# Patient Record
Sex: MALE | Race: WHITE | NOT HISPANIC OR LATINO | Employment: FULL TIME | URBAN - METROPOLITAN AREA
[De-identification: names, ages, dates, MRNs, and addresses within clinical notes are randomized per-mention and may not be internally consistent; named-entity substitution may affect disease eponyms.]

---

## 2018-02-20 ENCOUNTER — OFFICE VISIT (OUTPATIENT)
Dept: FAMILY MEDICINE | Facility: CLINIC | Age: 33
End: 2018-02-20
Payer: COMMERCIAL

## 2018-02-20 VITALS
HEIGHT: 74 IN | HEART RATE: 70 BPM | OXYGEN SATURATION: 99 % | DIASTOLIC BLOOD PRESSURE: 84 MMHG | WEIGHT: 184 LBS | BODY MASS INDEX: 23.61 KG/M2 | TEMPERATURE: 97.8 F | RESPIRATION RATE: 24 BRPM | SYSTOLIC BLOOD PRESSURE: 131 MMHG

## 2018-02-20 DIAGNOSIS — R05.9 COUGH: Primary | ICD-10-CM

## 2018-02-20 PROBLEM — Z13.6 CARDIOVASCULAR SCREENING; LDL GOAL LESS THAN 160: Status: ACTIVE | Noted: 2018-02-20

## 2018-02-20 PROCEDURE — 99213 OFFICE O/P EST LOW 20 MIN: CPT | Performed by: NURSE PRACTITIONER

## 2018-02-20 RX ORDER — DOXYCYCLINE 100 MG/1
100 CAPSULE ORAL 2 TIMES DAILY
Qty: 20 CAPSULE | Refills: 0 | Status: SHIPPED | OUTPATIENT
Start: 2018-02-20 | End: 2021-10-20

## 2018-02-20 RX ORDER — PREDNISONE 20 MG/1
20 TABLET ORAL DAILY
Qty: 5 TABLET | Refills: 0 | Status: SHIPPED | OUTPATIENT
Start: 2018-02-20 | End: 2021-10-20

## 2018-02-20 NOTE — PROGRESS NOTES
"  SUBJECTIVE:   Morro Coronel is a 32 year old male who presents to clinic today for the following health issues:      RESPIRATORY SYMPTOMS      Duration: 9 days     Description  nasal congestion, cough, wheezing, headache and chest tightness     Severity: severe    Accompanying signs and symptoms:     History (predisposing factors):  none    Precipitating or alleviating factors: None    Therapies tried and outcome:  Cough suppressants, ibuprofen, sudafed     No fevers  No body aches.    Denies sore throat or congestion.    No known exposures.      Reviewed and updated as needed this visit by clinical staff  Tobacco  Allergies  Meds  Problems  Med Hx  Surg Hx  Fam Hx  Soc Hx        Reviewed and updated as needed this visit by Provider  Tobacco  Allergies  Meds  Problems  Med Hx  Surg Hx  Fam Hx  Soc Hx          ROS:  Constitutional, HEENT, cardiovascular, pulmonary, GI, , musculoskeletal, neuro, skin, endocrine and psych systems are negative, except as otherwise noted.    OBJECTIVE:     /84  Pulse 70  Temp 97.8  F (36.6  C) (Oral)  Resp 24  Ht 6' 1.5\" (1.867 m)  Wt 184 lb (83.5 kg)  SpO2 99%  BMI 23.95 kg/m2  Body mass index is 23.95 kg/(m^2).  GENERAL: healthy, alert and no distress  EYES: Eyes grossly normal to inspection, PERRL and conjunctivae and sclerae normal  HENT: ear canals and TM's normal, nose and mouth without ulcers or lesions  NECK: no adenopathy, no asymmetry, masses, or scars and thyroid normal to palpation  RESP: lungs clear to auscultation - no rales, rhonchi or wheezes  CV: regular rate and rhythm, normal S1 S2, no S3 or S4, no murmur, click or rub, no peripheral edema and peripheral pulses strong  MS: no gross musculoskeletal defects noted, no edema  SKIN: no suspicious lesions or rashes  NEURO: Normal strength and tone, mentation intact and speech normal  PSYCH: mentation appears normal, affect normal/bright    Diagnostic Test Results:  none " "    ASSESSMENT/PLAN:       ICD-10-CM    1. Cough R05 doxycycline (VIBRAMYCIN) 100 MG capsule     predniSONE (DELTASONE) 20 MG tablet     VSS and lungs clear.  push fluids, rest as able.  Elevate HOB, lots of handwashing.  Toss your toothbrush after 24 hours on the antibiotics.  Pt requested steroid to \"open the airways\".      See Patient Instructions    GETACHEW Ang [facility]    "

## 2018-02-20 NOTE — MR AVS SNAPSHOT
"              After Visit Summary   2/20/2018    Morro Coronel    MRN: [id number]           Patient Information     Date Of Birth          1985        Visit Information        Provider Department      2/20/2018 9:20 AM Lauryn Grover APRN CNP [facility]        Today's Diagnoses     Cough    -  1       Follow-ups after your visit        Who to contact     If you have questions or need follow up information about today's clinic visit or your schedule please contact [facility] directly at [phone].  Normal or non-critical lab and imaging results will be communicated to you by Cas[employer], letter or phone within 4 business days after the clinic has received the results. If you do not hear from us within 7 days, please contact the clinic through [employer] or phone. If you have a critical or abnormal lab result, we will notify you by phone as soon as possible.  Submit refill requests through [employer] or call your pharmacy and they will forward the refill request to us. Please allow 3 business days for your refill to be completed.          Additional Information About Your Visit        MyChart Information     [employer] gives you secure access to your electronic health record. If you see a primary care provider, you can also send messages to your care team and make appointments. If you have questions, please call your primary care clinic.  If you do not have a primary care provider, please call [phone] and they will assist you.        Care EveryWhere ID     This is your Care EveryWhere ID. This could be used by other organizations to access your [place] medical records  [id number]        Your Vitals Were     Pulse Temperature Respirations Height Pulse Oximetry BMI (Body Mass Index)    70 97.8  F (36.6  C) (Oral) 24 6' 1.5\" (1.867 m) 99% 23.95 kg/m2       Blood Pressure from Last 3 Encounters:   02/20/18 131/84   11/21/16 128/72   09/09/15 117/77    Weight from " Last 3 Encounters:   02/20/18 184 lb (83.5 kg)   11/21/16 188 lb 6.4 oz (85.5 kg)   09/09/15 192 lb (87.1 kg)              Today, you had the following     No orders found for display         Today's Medication Changes          These changes are accurate as of 2/20/18 10:28 AM.  If you have any questions, ask your nurse or doctor.               Start taking these medicines.        Dose/Directions    doxycycline 100 MG capsule   Commonly known as:  VIBRAMYCIN   Used for:  Cough   Started by:  Lauryn Grover APRN CNP        Dose:  100 mg   Take 1 capsule (100 mg) by mouth 2 times daily   Quantity:  20 capsule   Refills:  0       predniSONE 20 MG tablet   Commonly known as:  DELTASONE   Used for:  Cough   Started by:  Lauryn Grover APRN CNP        Dose:  20 mg   Take 1 tablet (20 mg) by mouth daily   Quantity:  5 tablet   Refills:  0            Where to get your medicines      These medications were sent to [facility] - [place], [place] - [address]  [address], [place] [address]     Phone:  [phone]     doxycycline 100 MG capsule    predniSONE 20 MG tablet                Primary Care Provider Office Phone # Fax #    Giovanni Johan Tafoya [phone]       [address] [address]        Equal Access to Services     SAYDA CUENCA AH: Hadii miky ku hadasho Soomaali, waaxda luqadaha, qaybta kaalmada adeegyada, waxay xavi hayigncaia ford. So [facility] [phone].    ATENCIÓN: Si habla español, tiene a barry disposición servicios gratuitos de asistencia lingüística. Llame al [phone].    We comply with applicable federal civil rights laws and [place] laws. We do not discriminate on the basis of race, color, national origin, age, disability, sex, sexual orientation, or gender identity.            Thank you!     Thank you for choosing [facility]  for your care. Our goal is always to provide you with  excellent care. Hearing back from our patients is one way we can continue to improve our services. Please take a few minutes to complete the written survey that you may receive in the mail after your visit with us. Thank you!             Your Updated Medication List - Protect others around you: Learn how to safely use, store and throw away your medicines at www.disposemymeds.org.          This list is accurate as of 2/20/18 10:28 AM.  Always use your most recent med list.                   Brand Name Dispense Instructions for use Diagnosis    doxycycline 100 MG capsule    VIBRAMYCIN    20 capsule    Take 1 capsule (100 mg) by mouth 2 times daily    Cough       Multi-vitamin Tabs tablet      Take 1 tablet by mouth daily        predniSONE 20 MG tablet    DELTASONE    5 tablet    Take 1 tablet (20 mg) by mouth daily    Cough

## 2019-10-29 ENCOUNTER — IMMUNIZATION (OUTPATIENT)
Dept: NURSING | Facility: CLINIC | Age: 34
End: 2019-10-29
Payer: COMMERCIAL

## 2019-10-29 PROCEDURE — 90686 IIV4 VACC NO PRSV 0.5 ML IM: CPT

## 2019-10-29 PROCEDURE — 90471 IMMUNIZATION ADMIN: CPT

## 2020-03-01 ENCOUNTER — HEALTH MAINTENANCE LETTER (OUTPATIENT)
Age: 35
End: 2020-03-01

## 2020-10-02 ENCOUNTER — IMMUNIZATION (OUTPATIENT)
Dept: NURSING | Facility: CLINIC | Age: 35
End: 2020-10-02
Payer: COMMERCIAL

## 2020-10-02 PROCEDURE — 90686 IIV4 VACC NO PRSV 0.5 ML IM: CPT

## 2020-10-02 PROCEDURE — 90471 IMMUNIZATION ADMIN: CPT

## 2021-03-29 ENCOUNTER — VIRTUAL VISIT (OUTPATIENT)
Dept: FAMILY MEDICINE | Facility: CLINIC | Age: 36
End: 2021-03-29
Payer: COMMERCIAL

## 2021-03-29 DIAGNOSIS — M25.561 ACUTE PAIN OF RIGHT KNEE: ICD-10-CM

## 2021-03-29 DIAGNOSIS — M17.11 PATELLOFEMORAL ARTHRITIS OF RIGHT KNEE: Primary | ICD-10-CM

## 2021-03-29 PROCEDURE — 99203 OFFICE O/P NEW LOW 30 MIN: CPT | Mod: 95 | Performed by: FAMILY MEDICINE

## 2021-03-29 NOTE — PROGRESS NOTES
Morro is a 36 year old who is being evaluated via a billable video visit.      How would you like to obtain your AVS? MyChart  If the video visit is dropped, the invitation should be resent by: Text to cell phone: [phone]  Will anyone else be joining your video visit? No      Video Start Time: 308 pm    Assessment & Plan     Acute pain of right knee    - PHYSICAL THERAPY REFERRAL; Future    Patellofemoral arthritis of right knee  RICE therapy  - PHYSICAL THERAPY REFERRAL; Future      No follow-ups on file.    Marielle Shaikh MD  [facility]    Subjective   Morro is a 36 year old who presents for the following health issues   New Patient/Transfer of Care  Patient would to discuss current increased in physical activities.     Review of Systems   Constitutional, HEENT, cardiovascular, pulmonary, gi and gu systems are negative, except as otherwise noted.      Objective           Vitals:  No vitals were obtained today due to virtual visit.    Physical Exam   GENERAL: Healthy, alert and no distress  EYES: Eyes grossly normal to inspection.  No discharge or erythema, or obvious scleral/conjunctival abnormalities.  RESP: No audible wheeze, cough, or visible cyanosis.  No visible retractions or increased work of breathing.    SKIN: Visible skin clear. No significant rash, abnormal pigmentation or lesions.  NEURO: Cranial nerves grossly intact.  Mentation and speech appropriate for age.  PSYCH: Mentation appears normal, affect normal/bright, judgement and insight intact, normal speech and appearance well-groomed.    Orders Placed This Encounter   Procedures     PHYSICAL THERAPY REFERRAL       Marielle Shaikh MD        Video-Visit Details    Type of service:  Video Visit    Video End Time:3:17 PM    Originating Location (pt. Location): Home    Distant Location (provider location):  [facility]     Platform used for Video Visit: [employer]

## 2021-04-05 ENCOUNTER — THERAPY VISIT (OUTPATIENT)
Dept: PHYSICAL THERAPY | Facility: CLINIC | Age: 36
End: 2021-04-05
Attending: FAMILY MEDICINE
Payer: COMMERCIAL

## 2021-04-05 DIAGNOSIS — M25.561 ACUTE PAIN OF RIGHT KNEE: ICD-10-CM

## 2021-04-05 DIAGNOSIS — M17.11 PATELLOFEMORAL ARTHRITIS OF RIGHT KNEE: ICD-10-CM

## 2021-04-05 PROCEDURE — 97161 PT EVAL LOW COMPLEX 20 MIN: CPT | Mod: GP | Performed by: PHYSICAL THERAPIST

## 2021-04-05 PROCEDURE — 97110 THERAPEUTIC EXERCISES: CPT | Mod: GP | Performed by: PHYSICAL THERAPIST

## 2021-04-05 ASSESSMENT — ACTIVITIES OF DAILY LIVING (ADL)
WALK: ACTIVITY IS NOT DIFFICULT
KNEE_ACTIVITY_OF_DAILY_LIVING_SUM: 62
WEAKNESS: I HAVE THE SYMPTOM BUT IT DOES NOT AFFECT MY ACTIVITY
LIMPING: I DO NOT HAVE THE SYMPTOM
STAND: ACTIVITY IS NOT DIFFICULT
HOW_WOULD_YOU_RATE_THE_CURRENT_FUNCTION_OF_YOUR_KNEE_DURING_YOUR_USUAL_DAILY_ACTIVITIES_ON_A_SCALE_FROM_0_TO_100_WITH_100_BEING_YOUR_LEVEL_OF_KNEE_FUNCTION_PRIOR_TO_YOUR_INJURY_AND_0_BEING_THE_INABILITY_TO_PERFORM_ANY_OF_YOUR_USUAL_DAILY_ACTIVITIES?: 90
RAW_SCORE: 62
RISE FROM A CHAIR: ACTIVITY IS NOT DIFFICULT
AS_A_RESULT_OF_YOUR_KNEE_INJURY,_HOW_WOULD_YOU_RATE_YOUR_CURRENT_LEVEL_OF_DAILY_ACTIVITY?: NEARLY NORMAL
GIVING WAY, BUCKLING OR SHIFTING OF KNEE: I DO NOT HAVE THE SYMPTOM
GO UP STAIRS: ACTIVITY IS MINIMALLY DIFFICULT
KNEE_ACTIVITY_OF_DAILY_LIVING_SCORE: 88.57
STIFFNESS: THE SYMPTOM AFFECTS MY ACTIVITY SLIGHTLY
SIT WITH YOUR KNEE BENT: ACTIVITY IS MINIMALLY DIFFICULT
PAIN: THE SYMPTOM AFFECTS MY ACTIVITY SLIGHTLY
SWELLING: I DO NOT HAVE THE SYMPTOM
SQUAT: ACTIVITY IS NOT DIFFICULT
HOW_WOULD_YOU_RATE_THE_OVERALL_FUNCTION_OF_YOUR_KNEE_DURING_YOUR_USUAL_DAILY_ACTIVITIES?: NEARLY NORMAL
GO DOWN STAIRS: ACTIVITY IS MINIMALLY DIFFICULT
KNEEL ON THE FRONT OF YOUR KNEE: ACTIVITY IS NOT DIFFICULT

## 2021-04-05 NOTE — PROGRESS NOTES
Physical Therapy Initial Evaluation  April 5, 2021     Precautions/Restrictions/MD instructions: PT eval and treat.    Subjective:   Date of Onset:3/17/21  C/C: snapping at his posterior knee. Pain extends up to his glut and low back. He also reports anterior knee pain. He reports mild right sided low back pain.  Quality of pain is dull, aching and popping. Pains are described as intermittent in nature. Pain is worse: with activity. Pain is rated 5/10.   History of symptoms: Pains began suddenly as the result of resuming indoor rowing as a regular exercise. Since onset, symptoms are improving. Previous episodes:recurrent mild knee pain  Worsened by:rowing, squatting, stairs   Alleviated by: Rest and Ibuprofen.    General health as reported by patient: excellent  Pertinent medical/surgical history: Correction for  trigger thumb  on both thumbs as a child. He denies night pain,  significant current illness or recent hospital admission. He denies any regional implanted devices. He denies history of surgery in the area.  Imaging: x-ray. 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 Patient's goals are: decrease pain,  Improve tolerance to regular exercise including rowing, improve tolerance to stairs, squatting and lifting his 2 young children. Return to MD:  PRN.       Objective:  KNEE:    PROM:   L  R   Hyperextension 0 0   Extension 0 0   Flexion 155 click at 100 degrees 155     Strength:   L R   HIP     Flex 5/5 5/5   Ext 4+/5 4/5   Abd 4+/5 4-/5   KNEE     Flex 5/5 5/5   Ext 5/5 5/5     Hip PROM:     L R   IR 30 15   ER 60 60       Special tests:   R   Anterior Drawer -   Posterior Drawer -   Lachman's -   Valgus 0 degrees -   Valgus 30 degrees -   Varus 0 degrees -   Varus 30 degrees -   Yaquelin's -   HS length WNL compared to contralateral side       Palpation: tender at bicep femoris insertion on gerdy's tubercle     Patellar position: lateral  tilt    Functional: squat with significant increase in anterior translation of tibias, equal on left and right sides    Gait: Demos early right heel off and slight right compensated trandelenburg       HIP: Special tests:   L R   KAROLINE - -   FADIR - -     Lumbar Screen:    L R   SLR - -     AROM: (Major, Moderate, Minimal or Nil loss)  Movement Loss Tato Mod Min Nil Pain   Flexion   x     Extension  x      Side Gliding L  x      Side Gliding R   x             Assessment/Plan:    The patient is a 36 year old male with chief complaint of posterior and anterior knee pain.    The patient has the following significant findings with corresponding treatment plan.  Diagnosis 1:  Acute R knee pain, patellofemoral arthritis    Pain -  hot/cold therapy, manual therapy, splint/taping/bracing/orthotics, self management, directional preference exercise and home program  Decreased ROM/flexibility - manual therapy and therapeutic exercise  Decreased strength - therapeutic exercise and therapeutic activities  Impaired balance - neuro re-education and therapeutic activities  Decreased proprioception - neuro re-education and therapeutic activities  Impaired gait - gait training  Impaired muscle performance - neuro re-education  Decreased function - therapeutic activities  Impaired posture - neuro re-education          Therapy Evaluation Codes:   Cumulative Therapy Evaluation is: Low complexity.    Previous and current functional limitations:  (See Goal Flow Sheet for this information)    Short term and Long term goals: (See Goal Flow Sheet for this information)     Communication ability:  Patient appears to be able to clearly communicate and understand verbal and written communication and follow directions correctly.  Treatment Explanation - The following has been discussed with the patient: RX ordered/plan of care, anticipated outcomes, and possible risks and side effects.  This patient would benefit from PT intervention to resume  normal activities.   Rehab potential is good.    Frequency:  1 X week, once daily  Duration:  for 4 weeks tapering to 2 X a month over 2 months  Discharge Plan: Achieve all LTGs, be independent in home treatment program, and reach maximal therapeutic benefit.    Please refer to the daily flowsheet for treatment today, total treatment time and time spent performing 1:1 timed codes.

## 2021-04-12 ENCOUNTER — THERAPY VISIT (OUTPATIENT)
Dept: PHYSICAL THERAPY | Facility: CLINIC | Age: 36
End: 2021-04-12
Payer: COMMERCIAL

## 2021-04-12 DIAGNOSIS — M25.561 ACUTE PAIN OF RIGHT KNEE: Primary | ICD-10-CM

## 2021-04-12 DIAGNOSIS — M17.11 PATELLOFEMORAL ARTHRITIS OF RIGHT KNEE: ICD-10-CM

## 2021-04-12 PROCEDURE — 97110 THERAPEUTIC EXERCISES: CPT | Mod: GP | Performed by: PHYSICAL THERAPIST

## 2021-04-12 PROCEDURE — 97140 MANUAL THERAPY 1/> REGIONS: CPT | Mod: GP | Performed by: PHYSICAL THERAPIST

## 2021-04-19 ENCOUNTER — THERAPY VISIT (OUTPATIENT)
Dept: PHYSICAL THERAPY | Facility: CLINIC | Age: 36
End: 2021-04-19
Payer: COMMERCIAL

## 2021-04-19 DIAGNOSIS — M17.11 PATELLOFEMORAL ARTHRITIS OF RIGHT KNEE: ICD-10-CM

## 2021-04-19 DIAGNOSIS — M25.561 ACUTE PAIN OF RIGHT KNEE: ICD-10-CM

## 2021-04-19 PROCEDURE — 97140 MANUAL THERAPY 1/> REGIONS: CPT | Mod: GP | Performed by: PHYSICAL THERAPIST

## 2021-04-19 PROCEDURE — 97110 THERAPEUTIC EXERCISES: CPT | Mod: GP | Performed by: PHYSICAL THERAPIST

## 2021-04-26 ENCOUNTER — THERAPY VISIT (OUTPATIENT)
Dept: PHYSICAL THERAPY | Facility: CLINIC | Age: 36
End: 2021-04-26
Payer: COMMERCIAL

## 2021-04-26 DIAGNOSIS — M25.561 ACUTE PAIN OF RIGHT KNEE: ICD-10-CM

## 2021-04-26 DIAGNOSIS — M17.11 PATELLOFEMORAL ARTHRITIS OF RIGHT KNEE: ICD-10-CM

## 2021-04-26 PROCEDURE — 97112 NEUROMUSCULAR REEDUCATION: CPT | Mod: GP | Performed by: PHYSICAL THERAPIST

## 2021-04-26 PROCEDURE — 97110 THERAPEUTIC EXERCISES: CPT | Mod: GP | Performed by: PHYSICAL THERAPIST

## 2021-04-26 PROCEDURE — 97140 MANUAL THERAPY 1/> REGIONS: CPT | Mod: GP | Performed by: PHYSICAL THERAPIST

## 2021-05-06 ENCOUNTER — THERAPY VISIT (OUTPATIENT)
Dept: PHYSICAL THERAPY | Facility: CLINIC | Age: 36
End: 2021-05-06
Payer: COMMERCIAL

## 2021-05-06 DIAGNOSIS — M25.561 ACUTE PAIN OF RIGHT KNEE: ICD-10-CM

## 2021-05-06 DIAGNOSIS — M17.11 PATELLOFEMORAL ARTHRITIS OF RIGHT KNEE: ICD-10-CM

## 2021-05-06 PROCEDURE — 97112 NEUROMUSCULAR REEDUCATION: CPT | Mod: GP | Performed by: PHYSICAL THERAPIST

## 2021-05-06 PROCEDURE — 97110 THERAPEUTIC EXERCISES: CPT | Mod: GP | Performed by: PHYSICAL THERAPIST

## 2021-05-13 ENCOUNTER — THERAPY VISIT (OUTPATIENT)
Dept: PHYSICAL THERAPY | Facility: CLINIC | Age: 36
End: 2021-05-13
Payer: COMMERCIAL

## 2021-05-13 DIAGNOSIS — M17.11 PATELLOFEMORAL ARTHRITIS OF RIGHT KNEE: ICD-10-CM

## 2021-05-13 DIAGNOSIS — M25.561 ACUTE PAIN OF RIGHT KNEE: ICD-10-CM

## 2021-05-13 PROCEDURE — 97110 THERAPEUTIC EXERCISES: CPT | Mod: GP | Performed by: PHYSICAL THERAPIST

## 2021-05-13 PROCEDURE — 97112 NEUROMUSCULAR REEDUCATION: CPT | Mod: GP | Performed by: PHYSICAL THERAPIST

## 2021-05-13 PROCEDURE — 97530 THERAPEUTIC ACTIVITIES: CPT | Mod: GP | Performed by: PHYSICAL THERAPIST

## 2021-06-09 ENCOUNTER — THERAPY VISIT (OUTPATIENT)
Dept: PHYSICAL THERAPY | Facility: CLINIC | Age: 36
End: 2021-06-09
Payer: COMMERCIAL

## 2021-06-09 DIAGNOSIS — M17.11 PATELLOFEMORAL ARTHRITIS OF RIGHT KNEE: ICD-10-CM

## 2021-06-09 DIAGNOSIS — M25.561 ACUTE PAIN OF RIGHT KNEE: ICD-10-CM

## 2021-06-09 PROCEDURE — 97112 NEUROMUSCULAR REEDUCATION: CPT | Mod: GP | Performed by: PHYSICAL THERAPIST

## 2021-06-09 PROCEDURE — 97110 THERAPEUTIC EXERCISES: CPT | Mod: GP | Performed by: PHYSICAL THERAPIST

## 2021-06-09 ASSESSMENT — ACTIVITIES OF DAILY LIVING (ADL)
GO DOWN STAIRS: ACTIVITY IS NOT DIFFICULT
STAND: ACTIVITY IS NOT DIFFICULT
KNEE_ACTIVITY_OF_DAILY_LIVING_SUM: 68
WALK: ACTIVITY IS NOT DIFFICULT
LIMPING: I DO NOT HAVE THE SYMPTOM
HOW_WOULD_YOU_RATE_THE_CURRENT_FUNCTION_OF_YOUR_KNEE_DURING_YOUR_USUAL_DAILY_ACTIVITIES_ON_A_SCALE_FROM_0_TO_100_WITH_100_BEING_YOUR_LEVEL_OF_KNEE_FUNCTION_PRIOR_TO_YOUR_INJURY_AND_0_BEING_THE_INABILITY_TO_PERFORM_ANY_OF_YOUR_USUAL_DAILY_ACTIVITIES?: 85
HOW_WOULD_YOU_RATE_THE_OVERALL_FUNCTION_OF_YOUR_KNEE_DURING_YOUR_USUAL_DAILY_ACTIVITIES?: NEARLY NORMAL
KNEEL ON THE FRONT OF YOUR KNEE: ACTIVITY IS NOT DIFFICULT
GIVING WAY, BUCKLING OR SHIFTING OF KNEE: I DO NOT HAVE THE SYMPTOM
SWELLING: I DO NOT HAVE THE SYMPTOM
KNEE_ACTIVITY_OF_DAILY_LIVING_SCORE: 97.14
WEAKNESS: I DO NOT HAVE THE SYMPTOM
RAW_SCORE: 68
STIFFNESS: I DO NOT HAVE THE SYMPTOM
SQUAT: ACTIVITY IS MINIMALLY DIFFICULT
AS_A_RESULT_OF_YOUR_KNEE_INJURY,_HOW_WOULD_YOU_RATE_YOUR_CURRENT_LEVEL_OF_DAILY_ACTIVITY?: NORMAL
PAIN: I HAVE THE SYMPTOM BUT IT DOES NOT AFFECT MY ACTIVITY
RISE FROM A CHAIR: ACTIVITY IS NOT DIFFICULT
GO UP STAIRS: ACTIVITY IS NOT DIFFICULT
SIT WITH YOUR KNEE BENT: ACTIVITY IS NOT DIFFICULT

## 2021-06-15 NOTE — TELEPHONE ENCOUNTER
DIAGNOSIS: Injury (R) Shin - Possible Bursa *Primary concern* + Clicking of IT band at Fibular End/ Referred by Meena Montes   APPOINTMENT DATE: 6.23.21   NOTES STATUS DETAILS   OFFICE NOTE from referring provider Internal PT in [facility]   OFFICE NOTE from other specialist Internal 3.29.21 Dr Marielle Shaikh, [facility]   DISCHARGE SUMMARY from hospital N/A    DISCHARGE REPORT from the ER N/A    OPERATIVE REPORT N/A    EMG report N/A    MEDICATION LIST Internal    MRI N/A    DEXA (osteoporosis/bone health) N/A    CT SCAN N/A    XRAYS (IMAGES & REPORTS) N/A

## 2021-06-23 ENCOUNTER — PRE VISIT (OUTPATIENT)
Dept: ORTHOPEDICS | Facility: CLINIC | Age: 36
End: 2021-06-23

## 2021-06-23 ENCOUNTER — OFFICE VISIT (OUTPATIENT)
Dept: ORTHOPEDICS | Facility: CLINIC | Age: 36
End: 2021-06-23
Payer: COMMERCIAL

## 2021-06-23 ENCOUNTER — ANCILLARY PROCEDURE (OUTPATIENT)
Dept: GENERAL RADIOLOGY | Facility: CLINIC | Age: 36
End: 2021-06-23
Attending: FAMILY MEDICINE
Payer: COMMERCIAL

## 2021-06-23 VITALS — BODY MASS INDEX: 21.39 KG/M2 | HEIGHT: 75 IN | WEIGHT: 172 LBS

## 2021-06-23 DIAGNOSIS — M76.31 ILIOTIBIAL BAND SYNDROME OF RIGHT SIDE: ICD-10-CM

## 2021-06-23 DIAGNOSIS — M25.561 ACUTE PAIN OF RIGHT KNEE: Primary | ICD-10-CM

## 2021-06-23 DIAGNOSIS — M25.561 ACUTE PAIN OF RIGHT KNEE: ICD-10-CM

## 2021-06-23 PROCEDURE — 99203 OFFICE O/P NEW LOW 30 MIN: CPT | Performed by: FAMILY MEDICINE

## 2021-06-23 PROCEDURE — 73564 X-RAY EXAM KNEE 4 OR MORE: CPT | Mod: RT | Performed by: RADIOLOGY

## 2021-06-23 RX ORDER — CETIRIZINE HYDROCHLORIDE 10 MG/1
10 TABLET ORAL DAILY
COMMUNITY

## 2021-06-23 ASSESSMENT — MIFFLIN-ST. JEOR: SCORE: 1795.82

## 2021-06-23 NOTE — LETTER
"  6/23/2021      RE: Morro Coronel  [address] [place] [id number]         [facility]  SPORTS & ORTHOPEDIC CLINIC VISIT     Jun 23, 2021        SUBJECTIVE  Morro Coronel is a/an 36 year old male who is seen for right knee pain.    The patient is seen by themselves.  The patient is Right handed    In April Morro was playing with his children at the park when he jumped, striking his tibial tuberosity on a piece of playground equipment.  This was immediately swollen and quite painful.  Unfortunately the swelling has only mildly improved.  His pain has improved a significant degree.    He also has clicking in the lateral aspect of his right knee.  He was diagnosed with a snapping IT band and referred to physical therapy.  This is helping quite a bit, although his symptoms are present on occasion.    Location of Pain: right tibial tuberosity.  Worsened by: squatting position and knee flexion activities.   Better with: rest  Treatments tried: rest/activity avoidance, ice, ibuprofen and physical therapy.  Associated symptoms: swelling, warmth, locking or catching and pain  Has been seen by PT for IT Band  Orthopedic/Surgical history: NO  Social History/Occupation: small business owner.      REVIEW OF SYSTEMS:    Do you have fever, chills, weight loss? No    Do you have any vision problems? No    Do you have any chest pain or edema? No    Do you have any shortness of breath or wheezing?  No    Do you have stomach problems? No    Do you have any numbness or focal weakness? No    Do you have diabetes? No    Do you have problems with bleeding or clotting? No    Do you have an rashes or other skin lesions? No    OBJECTIVE:  Ht 1.905 m (6' 3\")   Wt 78 kg (172 lb)   BMI 21.50 kg/m       General  - normal appearance, in no obvious distress  HEENT  - conjunctivae not injected, moist mucous membranes  CV  - normal popliteal pulse  Pulm  - normal respiratory pattern, " non-labored  Musculoskeletal -right knee  - inspection: swelling over tibial tuberosity  - palpation: tender over tibial tuberosity  - ROM: 100 degrees flexion, 0 degrees extension, painful active ROM  - strength: 5/5 in flexion, 5/5 in extension  Neuro  - no sensory or motor deficit, grossly normal coordination, normal muscle tone  Skin  - no ecchymosis, erythema, warmth, or induration, no obvious rash  Psych  - interactive, appropriate, normal mood and affect              ASSESSMENT & PLAN    Mr. Coronel is here with right knee pain and swelling after striking his knee in April.    I ordered and reviewed an x-ray of his knee, this is normal and shows no obvious acute or chronic issues, no tibial tuberosity fracture.    He does have an apparent hematoma at the tibial tuberosity, this does appear to be resolving.  He may find quicker resolution with application of Voltaren gel, topically.  He can also use ice and compression, as helpful.    With regards to his IT band issues I am happy that physical therapy is working thus far.  He can attempt treatment with Voltaren gel on this area as well.    If this is still bothering him significantly over the coming 4 to 6 weeks we should follow-up.  If this is the case I may consider an MRI.      Garth Guerrero DO  [facility] [place]          Garth Guerrero DO

## 2021-06-23 NOTE — PROGRESS NOTES
"  [facility]  SPORTS & ORTHOPEDIC CLINIC VISIT     Jun 23, 2021        SUBJECTIVE  Morro Coronel is a/an 36 year old male who is seen for right knee pain.    The patient is seen by themselves.  The patient is Right handed    In April Morro was playing with his children at the park when he jumped, striking his tibial tuberosity on a piece of playground equipment.  This was immediately swollen and quite painful.  Unfortunately the swelling has only mildly improved.  His pain has improved a significant degree.    He also has clicking in the lateral aspect of his right knee.  He was diagnosed with a snapping IT band and referred to physical therapy.  This is helping quite a bit, although his symptoms are present on occasion.    Location of Pain: right tibial tuberosity.  Worsened by: squatting position and knee flexion activities.   Better with: rest  Treatments tried: rest/activity avoidance, ice, ibuprofen and physical therapy.  Associated symptoms: swelling, warmth, locking or catching and pain  Has been seen by PT for IT Band  Orthopedic/Surgical history: NO  Social History/Occupation: small business owner.      REVIEW OF SYSTEMS:    Do you have fever, chills, weight loss? No    Do you have any vision problems? No    Do you have any chest pain or edema? No    Do you have any shortness of breath or wheezing?  No    Do you have stomach problems? No    Do you have any numbness or focal weakness? No    Do you have diabetes? No    Do you have problems with bleeding or clotting? No    Do you have an rashes or other skin lesions? No    OBJECTIVE:  Ht 1.905 m (6' 3\")   Wt 78 kg (172 lb)   BMI 21.50 kg/m       General  - normal appearance, in no obvious distress  HEENT  - conjunctivae not injected, moist mucous membranes  CV  - normal popliteal pulse  Pulm  - normal respiratory pattern, non-labored  Musculoskeletal -right knee  - inspection: swelling over tibial tuberosity  - palpation: " tender over tibial tuberosity  - ROM: 100 degrees flexion, 0 degrees extension, painful active ROM  - strength: 5/5 in flexion, 5/5 in extension  Neuro  - no sensory or motor deficit, grossly normal coordination, normal muscle tone  Skin  - no ecchymosis, erythema, warmth, or induration, no obvious rash  Psych  - interactive, appropriate, normal mood and affect              ASSESSMENT & PLAN    Mr. Coronel is here with right knee pain and swelling after striking his knee in April.    I ordered and reviewed an x-ray of his knee, this is normal and shows no obvious acute or chronic issues, no tibial tuberosity fracture.    He does have an apparent hematoma at the tibial tuberosity, this does appear to be resolving.  He may find quicker resolution with application of Voltaren gel, topically.  He can also use ice and compression, as helpful.    With regards to his IT band issues I am happy that physical therapy is working thus far.  He can attempt treatment with Voltaren gel on this area as well.    If this is still bothering him significantly over the coming 4 to 6 weeks we should follow-up.  If this is the case I may consider an MRI.      Garth Guerrero, DO  [facility] [place]

## 2021-07-01 ENCOUNTER — THERAPY VISIT (OUTPATIENT)
Dept: PHYSICAL THERAPY | Facility: CLINIC | Age: 36
End: 2021-07-01
Payer: COMMERCIAL

## 2021-07-01 DIAGNOSIS — M17.11 PATELLOFEMORAL ARTHRITIS OF RIGHT KNEE: ICD-10-CM

## 2021-07-01 DIAGNOSIS — M25.561 ACUTE PAIN OF RIGHT KNEE: ICD-10-CM

## 2021-07-01 PROCEDURE — 97112 NEUROMUSCULAR REEDUCATION: CPT | Mod: GP | Performed by: PHYSICAL THERAPIST

## 2021-07-01 PROCEDURE — 97535 SELF CARE MNGMENT TRAINING: CPT | Mod: GP | Performed by: PHYSICAL THERAPIST

## 2021-07-01 PROCEDURE — 97110 THERAPEUTIC EXERCISES: CPT | Mod: GP | Performed by: PHYSICAL THERAPIST

## 2021-10-16 ENCOUNTER — IMMUNIZATION (OUTPATIENT)
Dept: PEDIATRICS | Facility: CLINIC | Age: 36
End: 2021-10-16
Payer: COMMERCIAL

## 2021-10-16 PROCEDURE — 90686 IIV4 VACC NO PRSV 0.5 ML IM: CPT

## 2021-10-16 PROCEDURE — 90471 IMMUNIZATION ADMIN: CPT

## 2021-10-20 ENCOUNTER — OFFICE VISIT (OUTPATIENT)
Dept: FAMILY MEDICINE | Facility: CLINIC | Age: 36
End: 2021-10-20
Payer: COMMERCIAL

## 2021-10-20 VITALS
HEIGHT: 74 IN | RESPIRATION RATE: 20 BRPM | WEIGHT: 176.3 LBS | HEART RATE: 72 BPM | BODY MASS INDEX: 22.63 KG/M2 | SYSTOLIC BLOOD PRESSURE: 118 MMHG | DIASTOLIC BLOOD PRESSURE: 64 MMHG

## 2021-10-20 DIAGNOSIS — L20.82 FLEXURAL ECZEMA: ICD-10-CM

## 2021-10-20 DIAGNOSIS — Z78.9 ALCOHOL USE: ICD-10-CM

## 2021-10-20 DIAGNOSIS — Z00.00 ROUTINE GENERAL MEDICAL EXAMINATION AT A HEALTH CARE FACILITY: Primary | ICD-10-CM

## 2021-10-20 LAB
ALBUMIN SERPL-MCNC: 5.1 G/DL (ref 3.5–5)
ALP SERPL-CCNC: 46 U/L (ref 45–120)
ALT SERPL W P-5'-P-CCNC: 16 U/L (ref 0–45)
ANION GAP SERPL CALCULATED.3IONS-SCNC: 12 MMOL/L (ref 5–18)
AST SERPL W P-5'-P-CCNC: 19 U/L (ref 0–40)
BILIRUB SERPL-MCNC: 0.6 MG/DL (ref 0–1)
BUN SERPL-MCNC: 14 MG/DL (ref 8–22)
CALCIUM SERPL-MCNC: 10.1 MG/DL (ref 8.5–10.5)
CHLORIDE BLD-SCNC: 103 MMOL/L (ref 98–107)
CHOLEST SERPL-MCNC: 203 MG/DL
CO2 SERPL-SCNC: 24 MMOL/L (ref 22–31)
CREAT SERPL-MCNC: 1.02 MG/DL (ref 0.7–1.3)
ERYTHROCYTE [DISTWIDTH] IN BLOOD BY AUTOMATED COUNT: 11.9 % (ref 10–15)
FASTING STATUS PATIENT QL REPORTED: ABNORMAL
GFR SERPL CREATININE-BSD FRML MDRD: >90 ML/MIN/1.73M2
GLUCOSE BLD-MCNC: 81 MG/DL (ref 70–125)
HBA1C MFR BLD: 5.5 % (ref 0–5.6)
HCT VFR BLD AUTO: 43 % (ref 40–53)
HDLC SERPL-MCNC: 49 MG/DL
HGB BLD-MCNC: 14.6 G/DL (ref 13.3–17.7)
LDLC SERPL CALC-MCNC: 137 MG/DL
MCH RBC QN AUTO: 28.9 PG (ref 26.5–33)
MCHC RBC AUTO-ENTMCNC: 34 G/DL (ref 31.5–36.5)
MCV RBC AUTO: 85 FL (ref 78–100)
PLATELET # BLD AUTO: 271 10E3/UL (ref 150–450)
POTASSIUM BLD-SCNC: 4.1 MMOL/L (ref 3.5–5)
PROT SERPL-MCNC: 7.8 G/DL (ref 6–8)
RBC # BLD AUTO: 5.06 10E6/UL (ref 4.4–5.9)
SODIUM SERPL-SCNC: 139 MMOL/L (ref 136–145)
TRIGL SERPL-MCNC: 86 MG/DL
TSH SERPL DL<=0.005 MIU/L-ACNC: 1.93 UIU/ML (ref 0.3–5)
WBC # BLD AUTO: 6.8 10E3/UL (ref 4–11)

## 2021-10-20 PROCEDURE — 99395 PREV VISIT EST AGE 18-39: CPT | Performed by: STUDENT IN AN ORGANIZED HEALTH CARE EDUCATION/TRAINING PROGRAM

## 2021-10-20 PROCEDURE — 83036 HEMOGLOBIN GLYCOSYLATED A1C: CPT | Performed by: STUDENT IN AN ORGANIZED HEALTH CARE EDUCATION/TRAINING PROGRAM

## 2021-10-20 PROCEDURE — 99213 OFFICE O/P EST LOW 20 MIN: CPT | Mod: 25 | Performed by: STUDENT IN AN ORGANIZED HEALTH CARE EDUCATION/TRAINING PROGRAM

## 2021-10-20 PROCEDURE — 80061 LIPID PANEL: CPT | Performed by: STUDENT IN AN ORGANIZED HEALTH CARE EDUCATION/TRAINING PROGRAM

## 2021-10-20 PROCEDURE — 36415 COLL VENOUS BLD VENIPUNCTURE: CPT | Performed by: STUDENT IN AN ORGANIZED HEALTH CARE EDUCATION/TRAINING PROGRAM

## 2021-10-20 PROCEDURE — 80050 GENERAL HEALTH PANEL: CPT | Performed by: STUDENT IN AN ORGANIZED HEALTH CARE EDUCATION/TRAINING PROGRAM

## 2021-10-20 RX ORDER — TRIAMCINOLONE ACETONIDE 1 MG/G
OINTMENT TOPICAL 2 TIMES DAILY
Qty: 80 G | Refills: 0 | Status: SHIPPED | OUTPATIENT
Start: 2021-10-20 | End: 2022-09-13

## 2021-10-20 RX ORDER — TRIAMCINOLONE ACETONIDE 1 MG/G
OINTMENT TOPICAL 2 TIMES DAILY
Qty: 80 G | Refills: 0 | Status: SHIPPED | OUTPATIENT
Start: 2021-10-20 | End: 2021-10-20

## 2021-10-20 ASSESSMENT — ANXIETY QUESTIONNAIRES
7. FEELING AFRAID AS IF SOMETHING AWFUL MIGHT HAPPEN: NOT AT ALL
3. WORRYING TOO MUCH ABOUT DIFFERENT THINGS: NOT AT ALL
1. FEELING NERVOUS, ANXIOUS, OR ON EDGE: SEVERAL DAYS
GAD7 TOTAL SCORE: 1
6. BECOMING EASILY ANNOYED OR IRRITABLE: NOT AT ALL
5. BEING SO RESTLESS THAT IT IS HARD TO SIT STILL: NOT AT ALL
IF YOU CHECKED OFF ANY PROBLEMS ON THIS QUESTIONNAIRE, HOW DIFFICULT HAVE THESE PROBLEMS MADE IT FOR YOU TO DO YOUR WORK, TAKE CARE OF THINGS AT HOME, OR GET ALONG WITH OTHER PEOPLE: SOMEWHAT DIFFICULT
2. NOT BEING ABLE TO STOP OR CONTROL WORRYING: NOT AT ALL

## 2021-10-20 ASSESSMENT — MIFFLIN-ST. JEOR: SCORE: 1799.44

## 2021-10-20 ASSESSMENT — PATIENT HEALTH QUESTIONNAIRE - PHQ9: 5. POOR APPETITE OR OVEREATING: NOT AT ALL

## 2021-10-20 NOTE — PATIENT INSTRUCTIONS
Preventive Health Recommendations  Male Ages 26 - 39    Yearly exam:             See your health care provider every year in order to  o   Review health changes.   o   Discuss preventive care.    o   Review your medicines if your doctor has prescribed any.    You should be tested each year for STDs (sexually transmitted diseases), if you re at risk.     After age 35, talk to your provider about cholesterol testing. If you are at risk for heart disease, have your cholesterol tested at least every 5 years.     If you are at risk for diabetes, you should have a diabetes test (fasting glucose).  Shots: Get a flu shot each year. Get a tetanus shot every 10 years.     Nutrition:    Eat at least 5 servings of fruits and vegetables daily.     Eat whole-grain bread, whole-wheat pasta and brown rice instead of white grains and rice.     Get adequate Calcium and Vitamin D.     Lifestyle    Exercise for at least 150 minutes a week (30 minutes a day, 5 days a week). This will help you control your weight and prevent disease.     Limit alcohol to one drink per day.     No smoking.     Wear sunscreen to prevent skin cancer.     See your dentist every six months for an exam and cleaning.      this admission

## 2021-10-20 NOTE — PROGRESS NOTES
SUBJECTIVE:   CC: Morro Coronel is an 36 year old male who presents for preventative health visit.       Patient has been advised of split billing requirements and indicates understanding: Yes     Healthy Habits:     Getting at least 3 servings of Calcium per day:  Yes    Bi-annual eye exam:  Yes    Dental care twice a year:  Yes    Sleep apnea or symptoms of sleep apnea:  None    Diet:  Regular (no restrictions)    Frequency of exercise:  1 day/week    Duration of exercise:  15-30 minutes    Taking medications regularly:  Yes    Medication side effects:  None    PHQ-2 Total Score: 0    Additional concerns today:  Yes    Acute concers: dry skin on hands.       Today's PHQ-2 Score:   PHQ-2 ( 1999 [employer]) 10/20/2021   Q1: Little interest or pleasure in doing things 0   Q2: Feeling down, depressed or hopeless 0   PHQ-2 Score 0   Q1: Little interest or pleasure in doing things Not at all   Q2: Feeling down, depressed or hopeless Not at all   PHQ-2 Score 0       Abuse: Current or Past(Physical, Sexual or Emotional)- No  Do you feel safe in your environment? Yes    Have you ever done Advance Care Planning? (For example, a Health Directive, POLST, or a discussion with a medical provider or your loved ones about your wishes): No, advance care planning information given to patient to review.  Patient plans to discuss their wishes with loved ones or provider.      Social History     Tobacco Use     Smoking status: Never Smoker     Smokeless tobacco: Never Used   Substance Use Topics     Alcohol use: Yes     Alcohol/week: 11.0 standard drinks     Types: 11 Standard drinks or equivalent per week     If you drink alcohol do you typically have >3 drinks per day or >7 drinks per week? No    Alcohol Use 10/20/2021   Prescreen: >3 drinks/day or >7 drinks/week? No   Prescreen: >3 drinks/day or >7 drinks/week? -     Last PSA: No results found for: PSA    Reviewed orders with patient. Reviewed health maintenance and updated  orders accordingly - Yes  Lab work is in process  Labs reviewed in EPIC  BP Readings from Last 3 Encounters:   10/20/21 118/64   02/20/18 131/84   11/21/16 128/72    Wt Readings from Last 3 Encounters:   10/20/21 80 kg (176 lb 4.8 oz)   06/23/21 78 kg (172 lb)   02/20/18 83.5 kg (184 lb)                  Patient Active Problem List   Diagnosis     CARDIOVASCULAR SCREENING; LDL GOAL LESS THAN 160     Past Surgical History:   Procedure Laterality Date     thumb surgery      as a young child       Social History     Tobacco Use     Smoking status: Never Smoker     Smokeless tobacco: Never Used   Substance Use Topics     Alcohol use: Yes     Alcohol/week: 11.0 standard drinks     Types: 11 Standard drinks or equivalent per week     Family History   Problem Relation Age of Onset     Diabetes Maternal Grandmother      Diabetes Maternal Uncle      Coronary Artery Disease Paternal Grandmother      Coronary Artery Disease Paternal Uncle      Depression Father          Current Outpatient Medications   Medication Sig Dispense Refill     cetirizine (ZYRTEC) 10 MG tablet Take 10 mg by mouth daily       multivitamin, therapeutic with minerals (MULTI-VITAMIN) TABS Take 1 tablet by mouth daily       triamcinolone (KENALOG) 0.1 % external ointment Apply topically 2 times daily 80 g 0     Allergies   Allergen Reactions     No Known Allergies        Reviewed and updated as needed this visit by clinical staff  Tobacco  Allergies  Meds              Reviewed and updated as needed this visit by Provider                Past Medical History:   Diagnosis Date     Atopic dermatitis     left arm, hands, eyelids     Seasonal allergies       Past Surgical History:   Procedure Laterality Date     thumb surgery      as a young child       Review of Systems  CONSTITUTIONAL: NEGATIVE for fever, chills, change in weight  INTEGUMENTARY/SKIN: NEGATIVE for worrisome rashes, moles or lesions  EYES: NEGATIVE for vision changes or irritation  ENT:  "NEGATIVE for ear, mouth and throat problems  RESP: NEGATIVE for significant cough or SOB  CV: NEGATIVE for chest pain, palpitations or peripheral edema  GI: NEGATIVE for nausea, abdominal pain, heartburn, or change in bowel habits   male: negative for dysuria, hematuria, decreased urinary stream, erectile dysfunction, urethral discharge  MUSCULOSKELETAL: NEGATIVE for significant arthralgias or myalgia  NEURO: NEGATIVE for weakness, dizziness or paresthesias  PSYCHIATRIC: NEGATIVE for changes in mood or affect    OBJECTIVE:   /64 (BP Location: Right arm, Patient Position: Sitting, Cuff Size: Adult Large)   Pulse 72   Resp 20   Ht 1.88 m (6' 2\")   Wt 80 kg (176 lb 4.8 oz)   BMI 22.64 kg/m      Physical Exam  GENERAL: healthy, alert and no distress  EYES: Eyes grossly normal to inspection, PERRL and conjunctivae and sclerae normal  HENT: ear canals and TM's normal, nose and mouth without ulcers or lesions  NECK: no adenopathy, no asymmetry, masses, or scars and thyroid normal to palpation  RESP: lungs clear to auscultation - no rales, rhonchi or wheezes  CV: regular rate and rhythm, normal S1 S2, no S3 or S4, no murmur, click or rub, no peripheral edema and peripheral pulses strong  ABDOMEN: soft, nontender, no hepatosplenomegaly, no masses and bowel sounds normal  MS: no gross musculoskeletal defects noted, no edema  SKIN: eczematous skin changes on the palms of the hand bilaterally with dry cracking skins of the knuckles.   NEURO: Normal strength and tone, mentation intact and speech normal  PSYCH: mentation appears normal, affect normal/bright    Diagnostic Test Results:  Labs reviewed in Epic    ASSESSMENT/PLAN:   Morro was seen today for physical.    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 " "  Sexually active: female, one   STI concenrs- no  Mood has donovan fluctuating over the last year and has noticed that his drinking has increased ( see discussion below)   Vaping/drugs/cigarettes - no   UTD on vaccines   Due for baseline screening bloodwork.   -     Lipid panel reflex to direct LDL Fasting; Future  -     TSH with free T4 reflex; Future  -     Hemoglobin A1c; Future    Alcohol use  Patient noted that he has increased the amount of alcohol that he drinks over the last year.  Alcohol has been an issue in the past.  Used to go to therapy for it which he found very helpful but has not been for 2 to 3 years.  There was a time where he was abstaining from alcohol altogether but now he drinks about 2 beers a day on average.  He also drinks on the weekends.  Has good insight and does think that his drinking is an issue and is interested in cutting down.  Is interesting in following up with therapy again.  Referral placed.  Will get CMP and CBC for some screening blood work.  -     Comprehensive metabolic panel (BMP + Alb, Alk Phos, ALT, AST, Total. Bili, TP); Future  -     CBC with platelets; Future  -     MENTAL HEALTH REFERRAL  - Adult; Outpatient Treatment; Individual/Couples/Family/Group Therapy/Health Psychology; [facility] [phone]; We will contact you to schedule the appointment or please call with any questions; Future    Flexural eczema  Patient has eczema on his hands with dry cracking skin.  The rash is itchy and there is chronic inflammatory changes.  Patient was given betamethasone many years ago which did help but then \"the rash came back\" and he is currently not using anything.  Uses Aveeno and Cetaphil on his hands.  Extensive amount of time spent in patient education of eczema-its autoimmune nature, his tendency to flare and importance of lifestyle changes/hydration of skin to prevent flares.  At this time, will trial triamcinolone twice daily x14 days and then have him " "come back for follow-up in 1 month.  If no improvement, can trial betamethasone.  Advised to stay hydrated, space out showers and start using thicker emollient moisturizers like Aquaphor/Vaseline  -     triamcinolone (KENALOG) 0.1 % external ointment; Apply topically 2 times daily    Other orders  -     REVIEW OF HEALTH MAINTENANCE PROTOCOL ORDERS      Addendum: Around 4:45 PM, there was a call for nurse to lab.  I was then called to lab as well because the patient had had a presyncopal episode.  He was getting his blood drawn when all of a sudden-he became nauseous, started to lose some of his vision but then came to quickly.  Initial vitals showed that his heart rate went down to 52 and came back up relatively quickly into the 70s.  Physical exam is benign with heart: RRR and lungs CTAB.  Neck ROM intact and PERRLA bilaterally.  Neck ROM intact.  Is able to walk around without any difficulty.  Given juice and he is okay to go home.  If he has another episode of near syncope, he needs to call the office immediately.  Patient verbalized understanding.    Patient has been advised of split billing requirements and indicates understanding: Yes  COUNSELING:   Reviewed preventive health counseling, as reflected in patient instructions       Alcohol Use        Advance Care Planning    Estimated body mass index is 22.64 kg/m  as calculated from the following:    Height as of this encounter: 1.88 m (6' 2\").    Weight as of this encounter: 80 kg (176 lb 4.8 oz).    He reports that he has never smoked. He has never used smokeless tobacco.    Yudith James DO  [facility]    "

## 2021-10-21 ASSESSMENT — ANXIETY QUESTIONNAIRES: GAD7 TOTAL SCORE: 1

## 2021-10-29 ENCOUNTER — OFFICE VISIT (OUTPATIENT)
Dept: FAMILY MEDICINE | Facility: CLINIC | Age: 36
End: 2021-10-29
Payer: COMMERCIAL

## 2021-10-29 VITALS
SYSTOLIC BLOOD PRESSURE: 128 MMHG | BODY MASS INDEX: 22.46 KG/M2 | HEART RATE: 76 BPM | DIASTOLIC BLOOD PRESSURE: 80 MMHG | OXYGEN SATURATION: 99 % | WEIGHT: 174.9 LBS

## 2021-10-29 DIAGNOSIS — L02.01 CUTANEOUS ABSCESS OF FACE: Primary | ICD-10-CM

## 2021-10-29 PROCEDURE — 99213 OFFICE O/P EST LOW 20 MIN: CPT | Performed by: FAMILY MEDICINE

## 2021-10-29 RX ORDER — SULFAMETHOXAZOLE/TRIMETHOPRIM 800-160 MG
1 TABLET ORAL 2 TIMES DAILY
Qty: 14 TABLET | Refills: 0 | Status: SHIPPED | OUTPATIENT
Start: 2021-10-29 | End: 2021-11-05

## 2021-10-30 NOTE — PROGRESS NOTES
Assessment & Plan     Cutaneous abscess of face  We will treat with Bactrim DS twice daily for 7 days.  Counseled on use of medication.  Advised application of warm compresses.  Continue ibuprofen as needed for discomfort.  Discussed indications for seeking reevaluation over weekend at urgent care.  Should follow-up with PCP if not improving with this treatment  - sulfamethoxazole-trimethoprim (BACTRIM DS) 800-160 MG tablet  Dispense: 14 tablet; Refill: 0                   No follow-ups on file.    Kati Guerra MD  [facility] FAREED Hooker is a 36 year old who presents for the following health issues     HPI   He is seen today for concern about a cyst near the corner of his mouth.  This started developing about 2 days ago.  He trimmed his yeboah and noticed what appeared to be a pimple develop near the right corner of his mouth.  Over the past couple of days this has increased in size.  It has become more painful.  Feels like it is filled with fluid.  Feels pain going into his right ear and on the right side of his neck.  He does not feel well but has not had any fevers or chills.  He has not had any drainage or discharge.  He did try applying a dry wet compress.  That was not much help.  He took 4 ibuprofen last night and this morning.  He did try applying some acne medication on Wednesday otherwise has not been applying any topical antibiotic ointments.  We reviewed his medications and allergies.  He has no other concerns or questions today.        Review of Systems         Objective    /80 (BP Location: Left arm, Cuff Size: Adult Regular)   Pulse 76   Wt 79.3 kg (174 lb 14.4 oz)   SpO2 99%   BMI 22.46 kg/m    Body mass index is 22.46 kg/m .  Physical Exam   GENERAL: healthy, alert and no distress  NECK: no adenopathy, no asymmetry, masses, or scars and thyroid normal to palpation  Skin: Near the right corner of his mouth is a well-circumscribed area of induration and  swelling associated with some overlying warmth and erythema.  No fluctuance noted.

## 2021-11-16 ENCOUNTER — VIRTUAL VISIT (OUTPATIENT)
Dept: PSYCHOLOGY | Facility: CLINIC | Age: 36
End: 2021-11-16
Payer: COMMERCIAL

## 2021-11-16 DIAGNOSIS — F43.9 TRAUMA AND STRESSOR-RELATED DISORDER: Primary | ICD-10-CM

## 2021-11-16 PROCEDURE — 90834 PSYTX W PT 45 MINUTES: CPT | Mod: 95

## 2021-11-16 ASSESSMENT — COLUMBIA-SUICIDE SEVERITY RATING SCALE - C-SSRS
2. HAVE YOU ACTUALLY HAD ANY THOUGHTS OF KILLING YOURSELF LIFETIME?: NO
1. IN THE PAST MONTH, HAVE YOU WISHED YOU WERE DEAD OR WISHED YOU COULD GO TO SLEEP AND NOT WAKE UP?: YES
REASONS FOR IDEATION LIFETIME: DOES NOT APPLY
1. IN THE PAST MONTH, HAVE YOU WISHED YOU WERE DEAD OR WISHED YOU COULD GO TO SLEEP AND NOT WAKE UP?: NO

## 2021-11-16 NOTE — PROGRESS NOTES
[facility]   Mental Health & Addiction Services     Progress Note - Initial Visit    Patient  Name:  Morro Coronel Date: 2021         Service Type: Individual     Visit Start Time: 1100  Visit End Time: 1150    Visit #: 1    Attendees: Client    Service Modality:  Video Visit:      Provider verified identity through the following two step process.  Patient provided:  Patient  and Patient address    Telemedicine Visit: The patient's condition can be safely assessed and treated via synchronous audio and visual telemedicine encounter.      Reason for Telemedicine Visit: Patient has requested telehealth visit    Originating Site (Patient Location): Patient's place of employment    Distant Site (Provider Location): Provider Remote Setting- Home Office    Consent:  The patient/guardian has verbally consented to: the potential risks and benefits of telemedicine (video visit) versus in person care; bill my insurance or make self-payment for services provided; and responsibility for payment of non-covered services.     Patient would like the video invitation sent by:  My Chart    Mode of Communication:  Video Conference via Amwell    As the provider I attest to compliance with applicable laws and regulations related to telemedicine.       DATA:   Interactive Complexity: No   Crisis: No     Presenting Concerns/  Current Stressors:   Patient reports increased anxiety and increased alcohol use. Patient reports relationship concerns and stress with work.  He reports past history with therapy which he found helpful. Patient reports past history of aggression. He is cooperative and engaged and appears to be open to learning.             ASSESSMENT:  Mental Status Assessment:  Appearance:   Appropriate   Eye Contact:   Good   Psychomotor Behavior: Normal   Attitude:   Cooperative   Orientation:   All  Speech   Rate / Production: Normal/ Responsive   Volume:  Normal    Mood:    Normal  Affect:    Appropriate   Thought Content:  Clear   Thought Form:  Logical   Insight:    Good       Safety Issues and Plan for Safety and Risk Management:     [place] Suicide Severity Rating Scale (Lifetime/Recent)  [place] Suicide Severity Rating (Lifetime/Recent) 11/16/2021   1. Wish to be Dead (Lifetime) Yes   Wish to be Dead Description (Lifetime) (No Data)   Comments In 2018 anxiety after birth of child    1. Wish to be Dead (Recent) No   2. Non-Specific Active Suicidal Thoughts (Lifetime) No   Most Severe Ideation Rating (Lifetime) 1   Most Severe Ideation Description (Lifetime) (No Data)   Comments wished to be dead   Frequency (Lifetime) 3   Duration (Lifetime) 1   Controllability (Lifetime) 1   Protective Factors  (Lifetime) 1   Reasons for Ideation (Lifetime) 0   Has subject engaged in non-suicidal self-injurious behavior? (Lifetime) Yes   Comments hit self with fist or wall   Has subject engaged in non-suicidal self-injurious behavior? (Past 3 Months) No     Patient denies current fears or concerns for personal safety.  Patient denies current or recent suicidal ideation or behaviors.  Patient denies current or recent homicidal ideation or behaviors.  Patient denies current or recent self injurious behavior or ideation.  Patient denies other safety concerns.  A safety and risk management plan has been developed including: Patient consented to co-developed safety plan.  Safety and risk management plan was completed.  Patient agreed to use safety plan should any safety concerns arise.  A copy was given to the patient.  Patient reports there are no firearms in the house.     Diagnostic Criteria:  Adjustment Disorder  A. The development of emotional or behavioral symptoms in response to an identifiable stressor(s) occurring within 3 months of the onset of the stressor(s)  B. These symptoms or behaviors are clinically significant, as evidenced by one or both of the following:  D. 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 Provider will continue Diagnostic Assessment.  Patient was given the following to do until next session:  Use safety plan as needed.     2. Provider recommended the following referrals: none at this time.      3.  Suicide Risk and Safety Concerns were assessed for Morro Coronel.    Patient meets the following risk assessment and triage: When the [place] Suicide Severity Rating Scale has been completed, the patient identifies lifetime history of suicidal ideation and/or Suicidal Behavior that is greater than 10 years.        Kate JIMENEZ, [facility]  2021    This note has been reviewed and I agree with the plan of care. This note is co-signed by Daisy Don [facility] Supervisor, on: 2021         Suicide Risk and Safety Concerns were assessed for Morro Coronel ROXI  1985.    Patient meets the following risk assessment and triage:  When the [place] Suicide Severity Rating Scale has been completed, the patient identifies lifetime history of suicidal ideation and/or Suicidal Behavior that is greater than 10 years.      The recommendation is to provide the Brief Safety Plan:    Adult Short Safety Plan:   Name: Morro Coronel  YOB: 1985  Date: 2021   My primary care provider: Yudith James  My primary care clinic: [facility]  My prescriber: see above   Other care team support:     My Triggers:  Relationship  conflict spouse MH concerns, Work  difficulties expectations from work , Home environment not having help with children and Substance Use alcohol use      Additional People, Places, and Things that I can access for support: Talk to PCP, spouse, brother, father,  kourtneyube          What is important to me and makes life worth living: family.         GREEN    Good Control  1. I feel good  2. No suicidal thoughts   3. Can work, sleep and play      Action Steps  1. Self-care: balanced meals, exercising, sleep practices, etc.  2. Take your medications as prescribed.  3. Continue meetings with therapist and prescriber.  4.  Do the healthy things that I enjoy.             YELLOW  Getting Worse  I have ANY of these:  1. I do not feel good  2. Difficulty Concentrating  3. Sleep is changing  4. Increase/Change in my thoughts to hurt self and/or others, but I can still manage and not act on it.   5. Not taking care of self.               Action Steps (in addition to the above):  1. Inform your therapist and psychiatric prescriber/PCP.  2. Keep taking your medications as prescribed.    3. Turn to people you can ask for help.  4. Use internal coping strategies -see below.  5. Create safe environment: notify friends/family of increase in symptoms             RED  Get Help  If I have ANY of these:  1. Current and uncontrollable thoughts and/or behaviors to hurt self and/or others.      Actions to manage my safety  1. Contact your emergency person Spouse, father, brother  2. Call my crisis team- [facility] [phone] [facility]  3. Or Call 911 or go to the emergency room right away          My Internal Coping Strategies include the following:   exercise and use my coping skills    [End for Brief Safety Plan]     Safety Concerns  How To Identify Situations That Make Your Mental Health Worse:  Triggers are things that make your mental health worse.  Look at the list below to help you find your triggers  and what you can do about them.     1. Identify Early Warning Signs:    Sometimes symptoms return, even when people do their best to stay well. Symptoms can develop over a short period of time with little or no warning, but most of the time they emerge gradually over several weeks.  Early warning signs are changes that people experience when a relapse is starting. Some early warning signs are common and others are not as common.   Common Early Warning Signs:    Feeling irritable and Urges to use drugs or alcohol     2. Identify action steps to take when warning signs are noticed:    Taking Action- It is important to take action if you are experiencing early warning signs of a relapse.  The faster you act, the more likely it is that you can avoid a full relapse.  It is helpful to identify several specific ways to cope with symptoms.      The following is my list of symptoms and coping strategies that I can use when they are present:    Symptom Coping Strategies   Anxiety -Talk with someone in your support system and let him or her know how you are feeling.  -Use relaxation techniques such as deep breathing or imagery.  -Use positive affirmations to counteract negative self-talk such as  I am learning to let go of worry.    Depression - Schedule your day; include activities you have to do and activities you enjoy doing.  - Get some exercise - walk, run, bike, or swim.  - Give yourself credit for even the smallest things you get done.   Sleep Difficulties   - Go to sleep at the same time every day.  - Do something relaxing before bed, such as drinking herbal tea or listening to music.  - Avoid having discussions about upsetting topics before going to bed.   Delusions   - Distract yourself from the disturbing thought by doing something that requires your attention such as a puzzle.  - Check out your beliefs by talking to someone you trust.    Hallucinations   - Use headphones to listen to music.  - Tell voices to  stop   or say to yourself,  I am safe.   - Ignore the hallucinations as much as possible; focus on other things.   Concentration Difficulties - Minimize distractions so there is only one thing for you to focus on at a time.    - Ask the person you are having a conversation with to slow down or repeat things you are unsure of.

## 2021-11-20 ENCOUNTER — IMMUNIZATION (OUTPATIENT)
Dept: NURSING | Facility: CLINIC | Age: 36
End: 2021-11-20
Payer: COMMERCIAL

## 2021-11-20 PROCEDURE — 91300 PR COVID VAC PFIZER DIL RECON 30 MCG/0.3 ML IM: CPT | Performed by: FAMILY MEDICINE

## 2021-11-20 PROCEDURE — 0004A PR COVID VAC PFIZER DIL RECON 30 MCG/0.3 ML IM: CPT | Performed by: FAMILY MEDICINE

## 2021-11-22 NOTE — PROGRESS NOTES
"  Assessment & Plan     Alcohol use  Since last time we met, patient has drastically reduced the amount of alcohol he is drinking.  Drinks now maybe 1 beer over the weekend.  Has set up with therapy and they are doing their initial intake.  Has his second appointment today with the psychologist.  Is feeling better with less alcohol in his system.  Is sleeping better.  No additional intervention today.  Will defer to psychology for continued therapy.    Eczema of scalp  Eczema of both hands  The last time he was here, had an eczema flare on his hands.  That has resolved with the triamcinolone but now he has eczema flare of his knuckles and inner web spaces on both hands.  The cold weather seems to be a big trigger.  Has only been applying spot doses of triamcinolone.  Would like him to do triamcinolone, scheduled twice daily for the next 7 to 10 days as well as Aquaphor in between applications to help resolve the flare.  Patient was also noted to have scapular eczema.  Would apply the triamcinolone cream on those lesions as well.  Should continue using gentle shampoos and space out showers.    26 minutes spent on the date of the encounter doing chart review, history and exam, documentation and further activities per the note    Return in about 6 months (around 5/23/2022) for Eczema follow up .    Yudith James DO  [facility]OR[facility]IE Hooker is a 36 year old who presents for the following health issues: skin and alcohol use.       Review of Systems   As per HPI       Objective    /64 (BP Location: Right arm, Patient Position: Sitting, Cuff Size: Adult Regular)   Pulse 60   Ht 1.88 m (6' 2\")   Wt 79.9 kg (176 lb 3.2 oz)   SpO2 96%   BMI 22.62 kg/m    Body mass index is 22.62 kg/m .     Physical Exam   GENERAL: healthy, alert and no distress  RESP: lungs clear to auscultation - no rales, rhonchi or wheezes  CV: regular rate and rhythm, normal S1 S2, no S3 or S4, no murmur, " click or rub, no peripheral edema and peripheral pulses strong  SKIN: eczema flare of the interweb spaces bilaterally and the knuckle bilaterally. (+)  eczematous patches of skin on the posterior occiput down to the start of his hairline. No fungal infection, dandruff noted. No other scalp lesions or areas of hair loss identified.   PSYCH: mentation appears normal, affect normal/bright

## 2021-11-23 ENCOUNTER — OFFICE VISIT (OUTPATIENT)
Dept: FAMILY MEDICINE | Facility: CLINIC | Age: 36
End: 2021-11-23
Payer: COMMERCIAL

## 2021-11-23 ENCOUNTER — VIRTUAL VISIT (OUTPATIENT)
Dept: PSYCHOLOGY | Facility: CLINIC | Age: 36
End: 2021-11-23
Payer: COMMERCIAL

## 2021-11-23 VITALS
HEIGHT: 74 IN | BODY MASS INDEX: 22.61 KG/M2 | HEART RATE: 60 BPM | SYSTOLIC BLOOD PRESSURE: 114 MMHG | WEIGHT: 176.2 LBS | OXYGEN SATURATION: 96 % | DIASTOLIC BLOOD PRESSURE: 64 MMHG

## 2021-11-23 DIAGNOSIS — Z78.9 ALCOHOL USE: Primary | ICD-10-CM

## 2021-11-23 DIAGNOSIS — L30.9 ECZEMA OF SCALP: ICD-10-CM

## 2021-11-23 DIAGNOSIS — F10.10 ALCOHOL USE DISORDER, MILD, ABUSE: Primary | ICD-10-CM

## 2021-11-23 DIAGNOSIS — L30.9 ECZEMA OF BOTH HANDS: ICD-10-CM

## 2021-11-23 PROCEDURE — 90791 PSYCH DIAGNOSTIC EVALUATION: CPT | Mod: 95

## 2021-11-23 PROCEDURE — 99213 OFFICE O/P EST LOW 20 MIN: CPT | Performed by: STUDENT IN AN ORGANIZED HEALTH CARE EDUCATION/TRAINING PROGRAM

## 2021-11-23 ASSESSMENT — MIFFLIN-ST. JEOR: SCORE: 1798.99
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
## 2021-12-16 ENCOUNTER — VIRTUAL VISIT (OUTPATIENT)
Dept: PSYCHOLOGY | Facility: CLINIC | Age: 36
End: 2021-12-16
Payer: COMMERCIAL

## 2021-12-16 DIAGNOSIS — F10.10 ALCOHOL USE DISORDER, MILD, ABUSE: Primary | ICD-10-CM

## 2021-12-16 PROCEDURE — 90834 PSYTX W PT 45 MINUTES: CPT | Mod: 95

## 2021-12-16 NOTE — PROGRESS NOTES
Progress Note    Patient Name: Morro Coronel  Date: 2021         Service Type: Individual      Session Start Time: 0800  Session End Time: 08     Session Length: 38-52    Session #: 3    Attendees: Client attended alone    Service Modality:  Video Visit:      Provider verified identity through the following two step process.  Patient provided:  Patient  and Patient is known previously to provider    Telemedicine Visit: The patient's condition can be safely assessed and treated via synchronous audio and visual telemedicine encounter.      Reason for Telemedicine Visit: Patient has requested telehealth visit    Originating Site (Patient Location): Patient's place of employment    Distant Site (Provider Location): Provider Remote Setting- Home Office    Consent:  The patient/guardian has verbally consented to: the potential risks and benefits of telemedicine (video visit) versus in person care; bill my insurance or make self-payment for services provided; and responsibility for payment of non-covered services.     Patient would like the video invitation sent by:  My Chart    Mode of Communication:  Video Conference via [employer]    As the provider I attest to compliance with applicable laws and regulations related to telemedicine.     Treatment Plan Last Reviewed: 2021  PHQ-9 / JEAN-PAUL-7 :   JEAN-PAUL-7 SCORE 10/20/2021 11/15/2021   Total Score - 0 (minimal anxiety)   Total Score 1 0       PHQ 11/15/2021   PHQ-9 Total Score 0   Q9: Thoughts of better off dead/self-harm past 2 weeks Not at all       DATA  Interactive Complexity: No  Crisis: No       Progress Since Last Session (Related to Symptoms / Goals / Homework):   Symptoms: Improving per patient report, decrease alcohol intake     Homework: Achieved / completed to satisfaction      Episode of Care Goals: Satisfactory progress - CONTEMPLATION (Considering change and yet undecided); Intervened by assessing the  "negative and positive thinking (ambivalence) about behavior change     Current / Ongoing Stressors and Concerns:   Patient reports increased anxiety and increased alcohol use. Patient reports relationship concerns and stress with work.  He reports past history with therapy which he found helpful. Patient reports past history of aggression. He is cooperative and engaged and appears to be open to learning.      Treatment Objective(s) Addressed in This Session:   identifying negative impact of substance use     Intervention:   CBT: assertive communication    Therapist met with patient to review goals and interventions. Therapist utilized reflective listening as patient gave brief reflection of week. Patient reported \"alcohol makes it hard for me to act appropriately. I cant make a connection with my wife. She can not make an connectin on an emotional level\". \"It numbs the edge of stress in the house.\" Therapist supported patient as he processed and shared concerns. Therapist reviewed negative impact of substance use, provided psychoeducation. Patient was able to identify marital problems and lack of motivation. Patient reports he \"feels better\" when is does not drink. Patient feels he is ready to discuss a lifestyle of sobriety with wife. Patient reports he had a brief conversation with spouse about sobriety. Patient was cooperative and engaged in session. No safety concerns reported.         ASSESSMENT: Current Emotional / Mental Status (status of significant symptoms):   Risk status (Self / Other harm or suicidal ideation)   Patient denies current fears or concerns for personal safety.   Patient denies current or recent suicidal ideation or behaviors.   Patient denies current or recent homicidal ideation or behaviors.   Patient denies current or recent self injurious behavior or ideation.   Patient denies other safety concerns.   Patient reports there has been no change in risk factors since their last session.  "    Patient reports there has been no change in protective factors since their last session.     Recommended that patient call 911 or go to the local ED should there be a change in any of these risk factors.     Appearance:   Appropriate    Eye Contact:   Good    Psychomotor Behavior: Normal    Attitude:   Cooperative    Orientation:   All   Speech    Rate / Production: Normal/ Responsive Normal     Volume:  Normal    Mood:    Sad    Affect:    Flat    Thought Content:  Clear    Thought Form:  Coherent  Logical    Insight:    Fair      Medication Review:   No changes to current psychiatric medication(s)     Medication Compliance:   Yes     Changes in Health Issues:   None reported     Chemical Use Review:   Substance Use: Problem use continues with no change since last session, Stage of Change: Contemplation  Provided encouragement towards sobriety        Tobacco Use: No current tobacco use.      Diagnosis:  1. Alcohol use disorder, mild, abuse        Collateral Reports Completed:   Not Applicable    PLAN: (Patient Tasks / Therapist Tasks / Other)  Patient will take to spouse and develop a plan for removal of alcohol from the household.         BARBARA Bowling, SW    This note has been reviewed and I agree with the plan of care. This note is co-signed by Daisy Don [facility] Super[facility], on: December 23, 2021                                                        ______________________________________________________________________    Treatment Plan    Patient's Name: Morro Coronel  YOB: 1985    Date: 12/16/2021    DSM5 Diagnoses: Substance-Related & Addictive Disorders Alcohol Use Disorder   305.00 (F10.10) Mild   Psychosocial / Contextual Factors: Employment concerns, relational discord, substance use    WHODAS:     Referral / Collaboration:  Referral to another professional/service is not indicated at this time..    Anticipated number of session or this episode of care: will  reassess in 90 days      MeasurableTreatment Goal(s) related to diagnosis / functional impairment(s)  Goal 1: Patient will feel more in control of emotions and be less reactive.     I will know I've met my goal when I'm not triggered when someone has an emotional reaction and I don't feel the need to fix it      Objective #A (Patient Action)    Patient will refrain from purchasing alcohol for household.  Status: New - Date: 12/16/2021     Intervention(s)  Therapist will teach assertiveness skills. Ability to say no..    Objective #B  Patient will learn & utilize at least 1-3 assertive communication skills weekly.  Status: New - Date: 12/16/2021     Intervention(s)  Therapist will teach assertiveness skills. Expressing both positive and negative emotions..    Objective #C  Patient will identify at least 3-5 triggers for anxiety.  Status: New - Date: 12/16/2021     Intervention(s)  Therapist will teach emotional regulation skills. Teach coping skills to manage anxiety.        Patient has reviewed and agreed to the above plan.      IRENE Cochran  December 16, 2021  This note has been reviewed and I agree with the treatment plan of care. This note is co-signed by Daisy Don [facility] Supervisor, on: December 23, 2021

## 2022-03-31 ENCOUNTER — LAB (OUTPATIENT)
Dept: FAMILY MEDICINE | Facility: CLINIC | Age: 37
End: 2022-03-31
Attending: FAMILY MEDICINE
Payer: COMMERCIAL

## 2022-03-31 DIAGNOSIS — Z20.822 CLOSE EXPOSURE TO 2019 NOVEL CORONAVIRUS: ICD-10-CM

## 2022-03-31 LAB — SARS-COV-2 RNA RESP QL NAA+PROBE: NEGATIVE

## 2022-03-31 PROCEDURE — U0005 INFEC AGEN DETEC AMPLI PROBE: HCPCS

## 2022-03-31 PROCEDURE — U0003 INFECTIOUS AGENT DETECTION BY NUCLEIC ACID (DNA OR RNA); SEVERE ACUTE RESPIRATORY SYNDROME CORONAVIRUS 2 (SARS-COV-2) (CORONAVIRUS DISEASE [COVID-19]), AMPLIFIED PROBE TECHNIQUE, MAKING USE OF HIGH THROUGHPUT TECHNOLOGIES AS DESCRIBED BY CMS-2020-01-R: HCPCS

## 2022-05-02 ENCOUNTER — OFFICE VISIT (OUTPATIENT)
Dept: FAMILY MEDICINE | Facility: CLINIC | Age: 37
End: 2022-05-02
Payer: COMMERCIAL

## 2022-05-02 VITALS
HEART RATE: 70 BPM | OXYGEN SATURATION: 98 % | RESPIRATION RATE: 20 BRPM | SYSTOLIC BLOOD PRESSURE: 92 MMHG | DIASTOLIC BLOOD PRESSURE: 62 MMHG | BODY MASS INDEX: 22.87 KG/M2 | WEIGHT: 178.1 LBS | TEMPERATURE: 98.2 F

## 2022-05-02 DIAGNOSIS — Z78.9 ALCOHOL USE: ICD-10-CM

## 2022-05-02 DIAGNOSIS — L30.9 ECZEMA, UNSPECIFIED TYPE: ICD-10-CM

## 2022-05-02 DIAGNOSIS — F41.9 ANXIETY: Primary | ICD-10-CM

## 2022-05-02 PROBLEM — F10.90 ALCOHOL USE: Status: ACTIVE | Noted: 2022-05-02

## 2022-05-02 PROCEDURE — 99214 OFFICE O/P EST MOD 30 MIN: CPT | Performed by: STUDENT IN AN ORGANIZED HEALTH CARE EDUCATION/TRAINING PROGRAM

## 2022-05-02 RX ORDER — PROPRANOLOL HYDROCHLORIDE 20 MG/1
20 TABLET ORAL 3 TIMES DAILY
Qty: 60 TABLET | Refills: 0 | Status: SHIPPED | OUTPATIENT
Start: 2022-05-02 | End: 2022-06-20

## 2022-05-02 RX ORDER — SERTRALINE HYDROCHLORIDE 25 MG/1
25 TABLET, FILM COATED ORAL DAILY
Qty: 90 TABLET | Refills: 0 | Status: SHIPPED | OUTPATIENT
Start: 2022-05-02 | End: 2022-06-20

## 2022-05-02 NOTE — PROGRESS NOTES
"  Assessment & Plan   Problem List Items Addressed This Visit    None     Visit Diagnoses     Anxiety    -  Primary    Relevant Medications    sertraline (ZOLOFT) 25 MG tablet    propranolol (INDERAL) 20 MG tablet    Alcohol use        Eczema, unspecified type            Eczema:  Today, patient reports that he is not sure if the triamcinolone is working.  However, has not used it consistently as prescribed because the twice daily dosing is tough for him to maintain with a busy home life and work life.  Offered to increase steroid cream potency and change to daily dosing to see if that would be a regiment that he would be able to fall more.  Patient declined.  We will try to do triamcinolone twice daily x14 days in addition to hydrating the skin to see if that helps.    Alcohol use:  JEAN-PAUL   The last time we spoke, patient had drastically reduced the amount that he drank.  Was drinking 1 beer over the weekend.  Now, he is reporting that he is drinking about 4-5 drinks during the week.  I did not go back to the therapist as he felt they did not click.  Had prolonged discussion regarding triggers for his drinking and ways that we can have him decrease his drinking.  My impression is that patient lives at a stressful life and that alcohol at the end of the day is a way for him to unwind and destress.  Patient acknowledged that that may be the case.  Discussed resuming therapy, trying naltrexone or SSRI.  Patient was initially not sure whether or not \"he was there yet\" in terms of medications but ultimately amenable to starting Zoloft.  Has family members on this medication and is comfortable trialing it.  Reviewed side effect profile, initially activating effects-including increased anxiety, depression and possible suicidal ideation.  If patient is having severe side effects, he is to discontinue the medication and give me a call.  Propanolol as needed dispensed for breakthrough anxiety.  Is not sure not feel that they " did not completely clicked.    38 minutes spent on the date of the encounter doing chart review, history and exam, documentation and further activities per the note    Return in about 4 weeks (around 5/30/2022) for Mood .    DO PAM Werner [facility] GEORGIE Hooker is a 37 year old who presents for the following health issues: Eczema + alcohol use    Review of Systems   As per HPI       Objective    BP 92/62 (BP Location: Right arm, Patient Position: Sitting, Cuff Size: Adult Large)   Pulse 70   Temp 98.2  F (36.8  C) (Oral)   Resp 20   Wt 80.8 kg (178 lb 1.6 oz)   SpO2 98%   BMI 22.87 kg/m    Body mass index is 22.87 kg/m .  Physical Exam   GENERAL: healthy, alert and no distress  SKIN: Eczematous patches of skin on his palms bilaterally, cracked skin on his knuckles and DIPs bilaterally.  PSYCH: mentation appears normal, affect anxious,

## 2022-06-20 ENCOUNTER — OFFICE VISIT (OUTPATIENT)
Dept: FAMILY MEDICINE | Facility: CLINIC | Age: 37
End: 2022-06-20
Payer: COMMERCIAL

## 2022-06-20 VITALS
HEART RATE: 80 BPM | BODY MASS INDEX: 22.22 KG/M2 | SYSTOLIC BLOOD PRESSURE: 115 MMHG | HEIGHT: 74 IN | RESPIRATION RATE: 18 BRPM | DIASTOLIC BLOOD PRESSURE: 70 MMHG | WEIGHT: 173.1 LBS | TEMPERATURE: 98 F

## 2022-06-20 DIAGNOSIS — L30.9 ECZEMA, UNSPECIFIED TYPE: ICD-10-CM

## 2022-06-20 DIAGNOSIS — F41.1 GAD (GENERALIZED ANXIETY DISORDER): Primary | ICD-10-CM

## 2022-06-20 DIAGNOSIS — Z78.9 ALCOHOL USE: ICD-10-CM

## 2022-06-20 PROCEDURE — 99214 OFFICE O/P EST MOD 30 MIN: CPT | Performed by: STUDENT IN AN ORGANIZED HEALTH CARE EDUCATION/TRAINING PROGRAM

## 2022-06-20 ASSESSMENT — ANXIETY QUESTIONNAIRES
5. BEING SO RESTLESS THAT IT IS HARD TO SIT STILL: NOT AT ALL
4. TROUBLE RELAXING: NOT AT ALL
GAD7 TOTAL SCORE: 2
GAD7 TOTAL SCORE: 2
6. BECOMING EASILY ANNOYED OR IRRITABLE: SEVERAL DAYS
1. FEELING NERVOUS, ANXIOUS, OR ON EDGE: SEVERAL DAYS
GAD7 TOTAL SCORE: 2
7. FEELING AFRAID AS IF SOMETHING AWFUL MIGHT HAPPEN: NOT AT ALL
7. FEELING AFRAID AS IF SOMETHING AWFUL MIGHT HAPPEN: NOT AT ALL
8. IF YOU CHECKED OFF ANY PROBLEMS, HOW DIFFICULT HAVE THESE MADE IT FOR YOU TO DO YOUR WORK, TAKE CARE OF THINGS AT HOME, OR GET ALONG WITH OTHER PEOPLE?: NOT DIFFICULT AT ALL
3. WORRYING TOO MUCH ABOUT DIFFERENT THINGS: NOT AT ALL
2. NOT BEING ABLE TO STOP OR CONTROL WORRYING: NOT AT ALL

## 2022-06-20 ASSESSMENT — PAIN SCALES - GENERAL: PAINLEVEL: NO PAIN (0)

## 2022-06-20 NOTE — PROGRESS NOTES
"  Assessment & Plan       ICD-10-CM    1. JEAN-PAUL (generalized anxiety disorder)  F41.1 sertraline (ZOLOFT) 50 MG tablet   2. Alcohol use  Z72.89    3. Eczema, unspecified type  L30.9        JEAN-PAUL  Alcohol use  Patient feels that his anxiety is improved but there is \"room for improvement\"  Is still consuming alcohol about 3-4 times a week  Will go up to Zoloft 50 mg.  Still has a few weeks of Zoloft 25 mg at home.  He will double dose and then he will  the new prescription on 7/6  We will follow-up with him in 6 weeks for ongoing mood/med titration.  No active SI/HI reported    Eczema: Has been diligent about his skin care routine and using the triamcinolone consistently for flares.  Eczema is much improved today.  Will monitor.    Return in about 6 weeks (around 8/1/2022) for using a video visit mood .    Yudith [facility]    Yony Hooker is a 37 year old presenting for the following health issues: mood    Last time patient was here, he had increased his alcohol consumption.  He was experiencing increased anxiety.  Low-dose Zoloft was started at 25 mg.  Is here for follow-up for that.    Today, patient is reporting that the Zoloft has helped with his anxiety \"quite a bit\"  He feels that things overall are less overwhelming and there is \"less noise\"  Feels like he has more reserve and patience when it comes to dealing with his kids if they are requiring a lot of his attention  Fewer things trigger him  If his wife's had a bad day, he has the ability to be more compassionate towards her  Feels less on edge  Is still consuming alcohol but less than he was.  Is now drinking 2-3 times a day.  Says that if the alcohol is \"there\" he will choose to drink alcohol over anything else  Not having any significant side effects with the Zoloft including any sexual side effects  Feels that things are better but there is still room for improvement    Review of Systems   As per HPI       " "  Objective    /70 (BP Location: Right arm, Patient Position: Sitting, Cuff Size: Adult Regular)   Pulse 80   Temp 98  F (36.7  C) (Oral)   Resp 18   Ht 1.887 m (6' 2.31\")   Wt 78.5 kg (173 lb 1.6 oz)   BMI 22.04 kg/m    Body mass index is 22.04 kg/m .  Physical Exam   GENERAL: healthy, alert and no distress  PSYCH: mentation appears normal, affect normal, slightly anxious                 .  ..  "

## 2022-08-01 NOTE — PROGRESS NOTES
"  Morro is a 37 year old who is being evaluated via a billable video visit.      How would you like to obtain your AVS? MyChart  If the video visit is dropped, the invitation should be resent by: Text to cell phone: [phone]  Will anyone else be joining your video visit? No          Assessment & Plan   Problem List Items Addressed This Visit        Behavioral    Alcohol use    Relevant Medications    naltrexone (DEPADE/REVIA) 50 MG tablet      Other Visit Diagnoses     JEAN-PAUL (generalized anxiety disorder)    -  Primary         JEAN-PAUL:  Patient reports that things are \"much better\" and there is \"a lot more ease\" on a day-to-day basis with the increased dose of Zoloft  Feels less triggered by things that used to trigger him  Feels he has more reserve and more patience with \"everything around him\"  Is continuing to be able to be empathetic around his wife  Sleep, memory and concentration have improved  Is not having any side effects from medications  Not currently in therapy  Feels this is a good dose for him and does not necessarily want to go up  Plan:  Will continue current dose of Zoloft    Alcohol use:  Patient notes that despite feeling much better from a mood perspective, he continues to drink alcohol.  If anything, his alcohol intake has increased.  He is drinking 1-2 beers daily.  He feels that there are several environmental and time cues in his day-to-day life that set him up for drinking.  He notices that he always drinks in the evening, around cooking dinner, grilling or if there is anything social.  He is \"aware of the cues\" but is not \"addressing them\".  Is interested in possibly being on medication to help decrease alcohol cravings.  Next week, he and his wife will be going up to the cabin to visit his in-laws and he knows that he will be drinking a lot more there.  Plan:  - Reviewed possibly going to therapy to take note of triggers and develop coping mechanisms  - Discussed possibly starting " naltrexone.  Patient is open to trialing it.  We will have him do naltrexone 25 mg x 1 week and if tolerating it, go up to 50 mg the next week.  Last liver profile was unremarkable without any transaminitis.  Reviewed side effect profile of the medication  - Patient will be in touch if he has significant side effects            Return in about 6 weeks (around 9/13/2022) for Mood.    Yudith DO Erika  [facility]    Yony Hooker is a 37 year old, presenting for the following health issues:  Follow Up (Mood )    For his JEAN-PAUL and alcohol use.    The last time he was here, he was feeling some breakthrough anxiety symptoms and was still consuming alcohol.  Zoloft was increased to 50 mg.      Review of Systems   As per HPI       Objective           Vitals:  No vitals were obtained today due to virtual visit.    Physical Exam   GENERAL: Healthy, alert and no distress  EYES: Eyes grossly normal to inspection.  No discharge or erythema, or obvious scleral/conjunctival abnormalities.  RESP: No audible wheeze, cough, or visible cyanosis.  No visible retractions or increased work of breathing.    SKIN: Visible skin clear. No significant rash, abnormal pigmentation or lesions.  NEURO: Cranial nerves grossly intact.  Mentation and speech appropriate for age.  PSYCH: Mentation appears normal, affect normal/bright, judgement and insight intact, normal speech and appearance well-groomed.          Video-Visit Details    Video Start Time: 7:17 AM    Type of service:  Video Visit    Video End Time:7:32 AM    Originating Location (pt. Location): Home    Distant Location (provider location):  [facility]     Platform used for Video Visit: [employer]    Collin Polanco.

## 2022-08-02 ENCOUNTER — VIRTUAL VISIT (OUTPATIENT)
Dept: FAMILY MEDICINE | Facility: CLINIC | Age: 37
End: 2022-08-02
Payer: COMMERCIAL

## 2022-08-02 DIAGNOSIS — Z78.9 ALCOHOL USE: ICD-10-CM

## 2022-08-02 DIAGNOSIS — F41.1 GAD (GENERALIZED ANXIETY DISORDER): Primary | ICD-10-CM

## 2022-08-02 PROCEDURE — 99214 OFFICE O/P EST MOD 30 MIN: CPT | Mod: 95 | Performed by: STUDENT IN AN ORGANIZED HEALTH CARE EDUCATION/TRAINING PROGRAM

## 2022-08-02 RX ORDER — NALTREXONE HYDROCHLORIDE 50 MG/1
50 TABLET, FILM COATED ORAL DAILY
Qty: 90 TABLET | Refills: 0 | Status: SHIPPED | OUTPATIENT
Start: 2022-08-02 | End: 2023-02-08

## 2022-08-02 ASSESSMENT — ANXIETY QUESTIONNAIRES
5. BEING SO RESTLESS THAT IT IS HARD TO SIT STILL: NOT AT ALL
8. IF YOU CHECKED OFF ANY PROBLEMS, HOW DIFFICULT HAVE THESE MADE IT FOR YOU TO DO YOUR WORK, TAKE CARE OF THINGS AT HOME, OR GET ALONG WITH OTHER PEOPLE?: NOT DIFFICULT AT ALL
GAD7 TOTAL SCORE: 1
7. FEELING AFRAID AS IF SOMETHING AWFUL MIGHT HAPPEN: NOT AT ALL
1. FEELING NERVOUS, ANXIOUS, OR ON EDGE: SEVERAL DAYS
2. NOT BEING ABLE TO STOP OR CONTROL WORRYING: NOT AT ALL
3. WORRYING TOO MUCH ABOUT DIFFERENT THINGS: NOT AT ALL
GAD7 TOTAL SCORE: 1
GAD7 TOTAL SCORE: 1
6. BECOMING EASILY ANNOYED OR IRRITABLE: NOT AT ALL
4. TROUBLE RELAXING: NOT AT ALL
7. FEELING AFRAID AS IF SOMETHING AWFUL MIGHT HAPPEN: NOT AT ALL
IF YOU CHECKED OFF ANY PROBLEMS ON THIS QUESTIONNAIRE, HOW DIFFICULT HAVE THESE PROBLEMS MADE IT FOR YOU TO DO YOUR WORK, TAKE CARE OF THINGS AT HOME, OR GET ALONG WITH OTHER PEOPLE: NOT DIFFICULT AT ALL

## 2022-08-02 ASSESSMENT — PATIENT HEALTH QUESTIONNAIRE - PHQ9
10. IF YOU CHECKED OFF ANY PROBLEMS, HOW DIFFICULT HAVE THESE PROBLEMS MADE IT FOR YOU TO DO YOUR WORK, TAKE CARE OF THINGS AT HOME, OR GET ALONG WITH OTHER PEOPLE: NOT DIFFICULT AT ALL
SUM OF ALL RESPONSES TO PHQ QUESTIONS 1-9: 0
SUM OF ALL RESPONSES TO PHQ QUESTIONS 1-9: 0

## 2022-09-12 NOTE — PROGRESS NOTES
"    Morro is a 37 year old who is being evaluated via a billable video visit.      How would you like to obtain your AVS? MyChart  If the video visit is dropped, the invitation should be resent by: Text to cell phone: [phone]  Will anyone else be joining your video visit? No        Assessment & Plan   Problem List Items Addressed This Visit    None     Visit Diagnoses     JEAN-PAUL (generalized anxiety disorder)    -  Primary    Relevant Medications    sertraline (ZOLOFT) 100 MG tablet    Flexural eczema        Relevant Medications    triamcinolone (KENALOG) 0.1 % external ointment    Engages in binge consumption of alcohol            Fatoumata alcohol use  The last time patient was here, we had discussed his alcohol intake and his binge drinking alcohol behaviors social setting.  Notes that he never started the naltrexone because he got nervous about the side effects after he talk to the pharmacist.  Says he is okay for now as far as alcohol intake.  As long as he is not around the alcohol, he tends to limit himself on how much he drinks.  Would like to hold off on starting naltrexone for now.    JEAN-PAUL:   Is wondering if we can perhaps go up on the Zoloft.  Feels that his anxiety is improved but still \"just below the surface\".  Does not feel like he is in danger \"boiling over\" but is wondering if an increase in his dose will make things better.  Is still patient/empathetic to his kids and his wife.  No significant side effects of the medication.  Plan:  We will have him increase Zoloft to 100 at home and if he feels that is beneficial, he will send me a message and I will send over Zoloft 100 mg.  Otherwise, if he feels that Zoloft 100 mg is too high of a dose-will send in Zoloft 25 mg a day for total of Zoloft 75 mg.  Patient amenable.  We will meet back up in 6 to 8 weeks.    Patient needs refill on his triamcinolone, dispensed      No follow-ups on file.    Yudith James DO  [facility] " GEORGIE Hooker is a 37 year old, presenting for the following health issues:  Follow Up (Mood pt states he thinks is better )      Review of Systems   As per HPI       Objective           Vitals:  No vitals were obtained today due to virtual visit.    Physical Exam   GENERAL: Healthy, alert and no distress  EYES: Eyes grossly normal to inspection.  No discharge or erythema, or obvious scleral/conjunctival abnormalities.  RESP: No audible wheeze, cough, or visible cyanosis.  No visible retractions or increased work of breathing.    SKIN: Visible skin clear. No significant rash, abnormal pigmentation or lesions.  NEURO: Cranial nerves grossly intact.  Mentation and speech appropriate for age.  PSYCH: Mentation appears normal, affect normal/bright, judgement and insight intact, normal speech and appearance well-groomed.      Video-Visit Details    Video Start Time: 7:14 AM    Type of service:  Video Visit    Video End Time:7:26 AM    Originating Location (pt. Location): Home    Distant Location (provider location):  [facility]     Platform used for Video Visit: [employer]

## 2022-09-13 ENCOUNTER — VIRTUAL VISIT (OUTPATIENT)
Dept: FAMILY MEDICINE | Facility: CLINIC | Age: 37
End: 2022-09-13
Payer: COMMERCIAL

## 2022-09-13 DIAGNOSIS — F41.1 GAD (GENERALIZED ANXIETY DISORDER): Primary | ICD-10-CM

## 2022-09-13 DIAGNOSIS — L20.82 FLEXURAL ECZEMA: ICD-10-CM

## 2022-09-13 DIAGNOSIS — F10.10 ENGAGES IN BINGE CONSUMPTION OF ALCOHOL: ICD-10-CM

## 2022-09-13 PROCEDURE — 99214 OFFICE O/P EST MOD 30 MIN: CPT | Mod: 95 | Performed by: STUDENT IN AN ORGANIZED HEALTH CARE EDUCATION/TRAINING PROGRAM

## 2022-09-13 RX ORDER — TRIAMCINOLONE ACETONIDE 1 MG/G
OINTMENT TOPICAL 2 TIMES DAILY
Qty: 80 G | Refills: 0 | Status: SHIPPED | OUTPATIENT
Start: 2022-09-13 | End: 2023-05-05

## 2022-09-13 RX ORDER — SERTRALINE HYDROCHLORIDE 100 MG/1
100 TABLET, FILM COATED ORAL DAILY
Refills: 0
Start: 2022-09-13 | End: 2022-09-23

## 2022-09-13 ASSESSMENT — ANXIETY QUESTIONNAIRES
2. NOT BEING ABLE TO STOP OR CONTROL WORRYING: NOT AT ALL
4. TROUBLE RELAXING: NOT AT ALL
7. FEELING AFRAID AS IF SOMETHING AWFUL MIGHT HAPPEN: NOT AT ALL
7. FEELING AFRAID AS IF SOMETHING AWFUL MIGHT HAPPEN: NOT AT ALL
GAD7 TOTAL SCORE: 0
5. BEING SO RESTLESS THAT IT IS HARD TO SIT STILL: NOT AT ALL
6. BECOMING EASILY ANNOYED OR IRRITABLE: NOT AT ALL
3. WORRYING TOO MUCH ABOUT DIFFERENT THINGS: NOT AT ALL
GAD7 TOTAL SCORE: 0
1. FEELING NERVOUS, ANXIOUS, OR ON EDGE: NOT AT ALL
GAD7 TOTAL SCORE: 0

## 2022-09-13 ASSESSMENT — PATIENT HEALTH QUESTIONNAIRE - PHQ9
SUM OF ALL RESPONSES TO PHQ QUESTIONS 1-9: 0
SUM OF ALL RESPONSES TO PHQ QUESTIONS 1-9: 0

## 2022-09-23 ENCOUNTER — MYC REFILL (OUTPATIENT)
Dept: FAMILY MEDICINE | Facility: CLINIC | Age: 37
End: 2022-09-23

## 2022-09-23 DIAGNOSIS — F41.1 GAD (GENERALIZED ANXIETY DISORDER): ICD-10-CM

## 2022-09-24 RX ORDER — SERTRALINE HYDROCHLORIDE 100 MG/1
100 TABLET, FILM COATED ORAL DAILY
Qty: 30 TABLET | Refills: 0 | Status: SHIPPED | OUTPATIENT
Start: 2022-09-24 | End: 2022-09-26

## 2022-09-24 NOTE — TELEPHONE ENCOUNTER
"Last Written Prescription Date:  9/13/22  Last Fill Quantity: 30,  # refills: 0   Last office visit provider:  9/13/22     Requested Prescriptions   Pending Prescriptions Disp Refills     sertraline (ZOLOFT) 100 MG tablet  0     Sig: Take 1 tablet (100 mg) by mouth daily       SSRIs Protocol Passed - 9/23/2022  7:06 AM        Passed - Recent (12 mo) or future (30 days) visit within the authorizing provider's specialty     Patient has had an office visit with the authorizing provider or a provider within the authorizing providers department within the previous 12 mos or has a future within next 30 days. See \"Patient Info\" tab in inbasket, or \"Choose Columns\" in Meds & Orders section of the refill encounter.              Passed - Medication is active on med list        Passed - Patient is age 18 or older             Marbella Arrington RN 09/24/22 1:50 PM  "

## 2022-09-26 DIAGNOSIS — F41.1 GAD (GENERALIZED ANXIETY DISORDER): ICD-10-CM

## 2022-09-26 RX ORDER — SERTRALINE HYDROCHLORIDE 100 MG/1
100 TABLET, FILM COATED ORAL DAILY
Qty: 90 TABLET | Refills: 0 | Status: SHIPPED | OUTPATIENT
Start: 2022-09-26 | End: 2023-01-26

## 2022-10-06 ENCOUNTER — TELEPHONE (OUTPATIENT)
Dept: FAMILY MEDICINE | Facility: CLINIC | Age: 37
End: 2022-10-06

## 2022-10-06 NOTE — TELEPHONE ENCOUNTER
Outbound call made to  - call picked up with no answer.    Attempting to schedule follow up appointment for patient (6 to 8 weeks)

## 2022-10-13 ENCOUNTER — E-VISIT (OUTPATIENT)
Dept: URGENT CARE | Facility: CLINIC | Age: 37
End: 2022-10-13
Payer: COMMERCIAL

## 2022-10-13 DIAGNOSIS — J01.90 ACUTE BACTERIAL SINUSITIS: Primary | ICD-10-CM

## 2022-10-13 DIAGNOSIS — B96.89 ACUTE BACTERIAL SINUSITIS: Primary | ICD-10-CM

## 2022-10-13 PROCEDURE — 99421 OL DIG E/M SVC 5-10 MIN: CPT | Performed by: PHYSICIAN ASSISTANT

## 2022-10-13 RX ORDER — DOXYCYCLINE HYCLATE 100 MG
100 TABLET ORAL 2 TIMES DAILY
Qty: 14 TABLET | Refills: 0 | Status: SHIPPED | OUTPATIENT
Start: 2022-10-13 | End: 2022-10-20

## 2022-10-27 ENCOUNTER — MYC REFILL (OUTPATIENT)
Dept: FAMILY MEDICINE | Facility: CLINIC | Age: 37
End: 2022-10-27

## 2022-10-27 DIAGNOSIS — F41.1 GAD (GENERALIZED ANXIETY DISORDER): ICD-10-CM

## 2022-10-27 RX ORDER — SERTRALINE HYDROCHLORIDE 100 MG/1
100 TABLET, FILM COATED ORAL DAILY
Qty: 90 TABLET | Refills: 0 | OUTPATIENT
Start: 2022-10-27

## 2022-12-20 ENCOUNTER — LAB (OUTPATIENT)
Dept: FAMILY MEDICINE | Facility: CLINIC | Age: 37
End: 2022-12-20
Attending: FAMILY MEDICINE
Payer: COMMERCIAL

## 2022-12-20 DIAGNOSIS — Z20.822 CLOSE EXPOSURE TO 2019 NOVEL CORONAVIRUS: ICD-10-CM

## 2022-12-20 LAB — SARS-COV-2 RNA RESP QL NAA+PROBE: NEGATIVE

## 2022-12-20 PROCEDURE — U0005 INFEC AGEN DETEC AMPLI PROBE: HCPCS

## 2022-12-20 PROCEDURE — U0003 INFECTIOUS AGENT DETECTION BY NUCLEIC ACID (DNA OR RNA); SEVERE ACUTE RESPIRATORY SYNDROME CORONAVIRUS 2 (SARS-COV-2) (CORONAVIRUS DISEASE [COVID-19]), AMPLIFIED PROBE TECHNIQUE, MAKING USE OF HIGH THROUGHPUT TECHNOLOGIES AS DESCRIBED BY CMS-2020-01-R: HCPCS

## 2022-12-28 ENCOUNTER — LAB (OUTPATIENT)
Dept: FAMILY MEDICINE | Facility: CLINIC | Age: 37
End: 2022-12-28
Attending: FAMILY MEDICINE
Payer: COMMERCIAL

## 2022-12-28 DIAGNOSIS — Z20.822 CLOSE EXPOSURE TO 2019 NOVEL CORONAVIRUS: ICD-10-CM

## 2022-12-28 LAB — SARS-COV-2 RNA RESP QL NAA+PROBE: NEGATIVE

## 2022-12-28 PROCEDURE — U0005 INFEC AGEN DETEC AMPLI PROBE: HCPCS

## 2022-12-28 PROCEDURE — U0003 INFECTIOUS AGENT DETECTION BY NUCLEIC ACID (DNA OR RNA); SEVERE ACUTE RESPIRATORY SYNDROME CORONAVIRUS 2 (SARS-COV-2) (CORONAVIRUS DISEASE [COVID-19]), AMPLIFIED PROBE TECHNIQUE, MAKING USE OF HIGH THROUGHPUT TECHNOLOGIES AS DESCRIBED BY CMS-2020-01-R: HCPCS

## 2023-01-25 DIAGNOSIS — F41.1 GAD (GENERALIZED ANXIETY DISORDER): ICD-10-CM

## 2023-01-26 RX ORDER — SERTRALINE HYDROCHLORIDE 100 MG/1
100 TABLET, FILM COATED ORAL DAILY
Qty: 90 TABLET | Refills: 0 | Status: SHIPPED | OUTPATIENT
Start: 2023-01-26 | End: 2023-05-05

## 2023-01-26 NOTE — TELEPHONE ENCOUNTER
Reason for Call:  Medication or medication refill:    Do you use a [facility]? [place] of the pharmacy and phone number for the current request:  [facility]     Name of the medication requested: Sertraline    Other request: Patient took last pill today, requesting refill today if at all possible.    Last Appt with PCP = 09/13/2022  Next Appt with PCP = 03/03/2023    Call taken on 1/26/2023 at 8:43 AM by Marbella Castro

## 2023-02-03 ENCOUNTER — LAB (OUTPATIENT)
Dept: FAMILY MEDICINE | Facility: CLINIC | Age: 38
End: 2023-02-03
Attending: FAMILY MEDICINE

## 2023-02-03 DIAGNOSIS — Z20.822 SUSPECTED 2019 NOVEL CORONAVIRUS INFECTION: ICD-10-CM

## 2023-02-03 LAB — SARS-COV-2 RNA RESP QL NAA+PROBE: NEGATIVE

## 2023-02-03 PROCEDURE — U0005 INFEC AGEN DETEC AMPLI PROBE: HCPCS

## 2023-02-03 PROCEDURE — U0003 INFECTIOUS AGENT DETECTION BY NUCLEIC ACID (DNA OR RNA); SEVERE ACUTE RESPIRATORY SYNDROME CORONAVIRUS 2 (SARS-COV-2) (CORONAVIRUS DISEASE [COVID-19]), AMPLIFIED PROBE TECHNIQUE, MAKING USE OF HIGH THROUGHPUT TECHNOLOGIES AS DESCRIBED BY CMS-2020-01-R: HCPCS

## 2023-02-08 ENCOUNTER — VIRTUAL VISIT (OUTPATIENT)
Dept: FAMILY MEDICINE | Facility: CLINIC | Age: 38
End: 2023-02-08
Payer: COMMERCIAL

## 2023-02-08 DIAGNOSIS — R05.8 COUGH PRODUCTIVE OF PURULENT SPUTUM: ICD-10-CM

## 2023-02-08 DIAGNOSIS — J40 BRONCHITIS: ICD-10-CM

## 2023-02-08 DIAGNOSIS — H10.9 BACTERIAL CONJUNCTIVITIS OF BOTH EYES: Primary | ICD-10-CM

## 2023-02-08 DIAGNOSIS — J35.1 SWELLING OF TONSIL: ICD-10-CM

## 2023-02-08 DIAGNOSIS — B96.89 BACTERIAL CONJUNCTIVITIS OF BOTH EYES: Primary | ICD-10-CM

## 2023-02-08 PROCEDURE — 99213 OFFICE O/P EST LOW 20 MIN: CPT | Mod: 95 | Performed by: NURSE PRACTITIONER

## 2023-02-08 RX ORDER — OFLOXACIN 3 MG/ML
1-2 SOLUTION/ DROPS OPHTHALMIC
Qty: 5 ML | Refills: 0 | Status: SHIPPED | OUTPATIENT
Start: 2023-02-08 | End: 2023-02-13

## 2023-02-08 RX ORDER — FLUTICASONE PROPIONATE 50 MCG
1 SPRAY, SUSPENSION (ML) NASAL PRN
COMMUNITY

## 2023-02-08 RX ORDER — GUAIFENESIN 600 MG/1
1200 TABLET, EXTENDED RELEASE ORAL 2 TIMES DAILY
COMMUNITY
End: 2023-03-03

## 2023-02-08 NOTE — PROGRESS NOTES
Morro is a 37 year old who is being evaluated via a billable video visit.      How would you like to obtain your AVS? MyChart  If the video visit is dropped, the invitation should be resent by: Text to cell phone: [phone]  Will anyone else be joining your video visit? No      Assessment & Plan     Bacterial conjunctivitis of both eyes    - ofloxacin (OCUFLOX) 0.3 % ophthalmic solution; Place 1-2 drops into both eyes every 2 hours (while awake) for 5 days    Bronchitis    - amoxicillin-clavulanate (AUGMENTIN) 875-125 MG tablet; Take 1 tablet by mouth 2 times daily With daily yogurt or probiotics    Cough productive of purulent sputum    - amoxicillin-clavulanate (AUGMENTIN) 875-125 MG tablet; Take 1 tablet by mouth 2 times daily With daily yogurt or probiotics    Swelling of tonsil    - amoxicillin-clavulanate (AUGMENTIN) 875-125 MG tablet; Take 1 tablet by mouth 2 times daily With daily yogurt or probiotics    30 minutes spent on the date of the encounter doing chart review, history and exam, documentation and further activities per the note     See Patient Instructions: take full course of antibiotics with daily yogurt or probiotics. Use full course of eye drops. Use flonase twice daily and OTC cough medication. Push fluids. If symptoms worsen, come in for in person evaluation.  Review after visit summary tips.     Return in about 1 week (around 2/15/2023), or if symptoms worsen or fail to improve.    CLARKE LEIGH, JUAN DANIEL  [facility] JESSICA Hooker is a 37 year old, presenting for the following health issues:  Sick    He reports symptoms started last Wednesday. Took 2 COVID tests that were negative. Did not take a flu test. Has kids. Unsure if exposure to strep. Father in law positive for rhino virus. His symptoms are similar to his. Stared severe, worsening sore throat, hard to swallow spit, painful, harsh cough with yellow phlegm. Ear pressure. Eyes crusted shut in  morning with purulent drainage. Discussed a lot is going around currently- flu, covid, strep, other viruses- if within 5 days of symptoms would have encouraged him to get tested due to antiviral treatment options. Antivirals are no longer an option, due to the severity of his symptoms and bacterial symptoms will treat with antibiotics. Take full course of oral and antibiotic drops with daily yogurt or probiotics. Review after visit summary tips. If worsening symptoms go in for in person evaluation. Use OTC medication as discussed. Pt in agreement.   History of Present Illness       Reason for visit:  Treatment options for rhinovirus and conjunctivitis  Symptom onset:  3-7 days ago  Symptoms include:  Sore throat, inflammation of the throat, cough, chest and nasal congestion, insomnia, conjunctivitis, light sensitivity, headache.  Symptom intensity:  Severe  Symptom progression:  Staying the same  Had these symptoms before:  No  What makes it worse:  Not sleeping, missing a dose of ibuprofen or Tylenol, bright light.  What makes it better:  Pain medicine, congestion medicine, sleep, fluids (water, warm tea).    He eats 2-3 servings of fruits and vegetables daily.He consumes 0 sweetened beverage(s) daily.He exercises with enough effort to increase his heart rate 9 or less minutes per day.  He exercises with enough effort to increase his heart rate 4 days per week.   He is taking medications regularly.       Review of Systems   Constitutional, HEENT, cardiovascular, pulmonary, GI, , musculoskeletal, neuro, skin, endocrine and psych systems are negative, except as otherwise noted.      Objective           Vitals:  No vitals were obtained today due to virtual visit.    Physical Exam   GENERAL: Healthy, alert and no distress  EYES: Eyes grossly normal to inspection. POSITIVE for eye lid swelling, no pain with palpation of closed eyes. Conjunctiva injected bilaterally with purulent drainage present.  ENT: POSITIVE for  purulent nasal drainage.  RESP: POSITIVE for harsh cough observed. No visible retractions or increased work of breathing.    SKIN: Visible skin clear. No significant rash, abnormal pigmentation or lesions.  NEURO: Cranial nerves grossly intact.  Mentation and speech appropriate for age.  PSYCH: Mentation appears normal, affect normal/bright, judgement and insight intact, normal speech and appearance well-groomed.    Video-Visit Details    Type of service:  Video Visit     Originating Location (pt. Location): Home  Distant Location (provider location):  Off-site  Platform used for Video Visit: Cesario

## 2023-03-03 ENCOUNTER — OFFICE VISIT (OUTPATIENT)
Dept: FAMILY MEDICINE | Facility: CLINIC | Age: 38
End: 2023-03-03
Payer: COMMERCIAL

## 2023-03-03 VITALS
BODY MASS INDEX: 22.65 KG/M2 | HEIGHT: 75 IN | WEIGHT: 182.2 LBS | DIASTOLIC BLOOD PRESSURE: 72 MMHG | TEMPERATURE: 98.3 F | SYSTOLIC BLOOD PRESSURE: 126 MMHG | OXYGEN SATURATION: 99 % | HEART RATE: 75 BPM | RESPIRATION RATE: 24 BRPM

## 2023-03-03 DIAGNOSIS — Z81.8 FAMILY HISTORY OF ATTENTION DEFICIT HYPERACTIVITY DISORDER (ADHD): ICD-10-CM

## 2023-03-03 DIAGNOSIS — F33.1 MODERATE EPISODE OF RECURRENT MAJOR DEPRESSIVE DISORDER (H): ICD-10-CM

## 2023-03-03 DIAGNOSIS — R41.840 DIFFICULTY CONCENTRATING: ICD-10-CM

## 2023-03-03 DIAGNOSIS — Z78.9 ALCOHOL USE: ICD-10-CM

## 2023-03-03 DIAGNOSIS — Z00.00 ROUTINE GENERAL MEDICAL EXAMINATION AT A HEALTH CARE FACILITY: Primary | ICD-10-CM

## 2023-03-03 LAB
ALBUMIN SERPL BCG-MCNC: 5.2 G/DL (ref 3.5–5.2)
ALP SERPL-CCNC: 49 U/L (ref 40–129)
ALT SERPL W P-5'-P-CCNC: 17 U/L (ref 10–50)
ANION GAP SERPL CALCULATED.3IONS-SCNC: 13 MMOL/L (ref 7–15)
AST SERPL W P-5'-P-CCNC: 25 U/L (ref 10–50)
BILIRUB SERPL-MCNC: 0.4 MG/DL
BUN SERPL-MCNC: 12.2 MG/DL (ref 6–20)
CALCIUM SERPL-MCNC: 9.7 MG/DL (ref 8.6–10)
CHLORIDE SERPL-SCNC: 102 MMOL/L (ref 98–107)
CHOLEST SERPL-MCNC: 213 MG/DL
CREAT SERPL-MCNC: 0.94 MG/DL (ref 0.67–1.17)
DEPRECATED HCO3 PLAS-SCNC: 25 MMOL/L (ref 22–29)
ERYTHROCYTE [DISTWIDTH] IN BLOOD BY AUTOMATED COUNT: 12.3 % (ref 10–15)
GFR SERPL CREATININE-BSD FRML MDRD: >90 ML/MIN/1.73M2
GLUCOSE SERPL-MCNC: 100 MG/DL (ref 70–99)
HBA1C MFR BLD: 5.7 % (ref 0–5.6)
HCT VFR BLD AUTO: 42.3 % (ref 40–53)
HDLC SERPL-MCNC: 54 MG/DL
HGB BLD-MCNC: 14.4 G/DL (ref 13.3–17.7)
LDLC SERPL CALC-MCNC: 143 MG/DL
MCH RBC QN AUTO: 28.7 PG (ref 26.5–33)
MCHC RBC AUTO-ENTMCNC: 34 G/DL (ref 31.5–36.5)
MCV RBC AUTO: 84 FL (ref 78–100)
NONHDLC SERPL-MCNC: 159 MG/DL
PLATELET # BLD AUTO: 281 10E3/UL (ref 150–450)
POTASSIUM SERPL-SCNC: 4.3 MMOL/L (ref 3.4–5.3)
PROT SERPL-MCNC: 7.6 G/DL (ref 6.4–8.3)
RBC # BLD AUTO: 5.02 10E6/UL (ref 4.4–5.9)
SODIUM SERPL-SCNC: 140 MMOL/L (ref 136–145)
TRIGL SERPL-MCNC: 79 MG/DL
TSH SERPL DL<=0.005 MIU/L-ACNC: 2.35 UIU/ML (ref 0.3–4.2)
WBC # BLD AUTO: 6.4 10E3/UL (ref 4–11)

## 2023-03-03 PROCEDURE — 80053 COMPREHEN METABOLIC PANEL: CPT | Performed by: STUDENT IN AN ORGANIZED HEALTH CARE EDUCATION/TRAINING PROGRAM

## 2023-03-03 PROCEDURE — 83036 HEMOGLOBIN GLYCOSYLATED A1C: CPT | Performed by: STUDENT IN AN ORGANIZED HEALTH CARE EDUCATION/TRAINING PROGRAM

## 2023-03-03 PROCEDURE — 36415 COLL VENOUS BLD VENIPUNCTURE: CPT | Performed by: STUDENT IN AN ORGANIZED HEALTH CARE EDUCATION/TRAINING PROGRAM

## 2023-03-03 PROCEDURE — 99395 PREV VISIT EST AGE 18-39: CPT | Mod: 25 | Performed by: STUDENT IN AN ORGANIZED HEALTH CARE EDUCATION/TRAINING PROGRAM

## 2023-03-03 PROCEDURE — 99213 OFFICE O/P EST LOW 20 MIN: CPT | Mod: 25 | Performed by: STUDENT IN AN ORGANIZED HEALTH CARE EDUCATION/TRAINING PROGRAM

## 2023-03-03 PROCEDURE — 84443 ASSAY THYROID STIM HORMONE: CPT | Performed by: STUDENT IN AN ORGANIZED HEALTH CARE EDUCATION/TRAINING PROGRAM

## 2023-03-03 PROCEDURE — 96127 BRIEF EMOTIONAL/BEHAV ASSMT: CPT | Performed by: STUDENT IN AN ORGANIZED HEALTH CARE EDUCATION/TRAINING PROGRAM

## 2023-03-03 PROCEDURE — 80061 LIPID PANEL: CPT | Performed by: STUDENT IN AN ORGANIZED HEALTH CARE EDUCATION/TRAINING PROGRAM

## 2023-03-03 PROCEDURE — 85027 COMPLETE CBC AUTOMATED: CPT | Performed by: STUDENT IN AN ORGANIZED HEALTH CARE EDUCATION/TRAINING PROGRAM

## 2023-03-03 RX ORDER — BUPROPION HYDROCHLORIDE 150 MG/1
150 TABLET ORAL EVERY MORNING
Qty: 90 TABLET | Refills: 0 | Status: SHIPPED | OUTPATIENT
Start: 2023-03-03 | End: 2023-05-05

## 2023-03-03 ASSESSMENT — ANXIETY QUESTIONNAIRES
GAD7 TOTAL SCORE: 4
8. IF YOU CHECKED OFF ANY PROBLEMS, HOW DIFFICULT HAVE THESE MADE IT FOR YOU TO DO YOUR WORK, TAKE CARE OF THINGS AT HOME, OR GET ALONG WITH OTHER PEOPLE?: SOMEWHAT DIFFICULT
GAD7 TOTAL SCORE: 4
4. TROUBLE RELAXING: SEVERAL DAYS
GAD7 TOTAL SCORE: 4
6. BECOMING EASILY ANNOYED OR IRRITABLE: SEVERAL DAYS
2. NOT BEING ABLE TO STOP OR CONTROL WORRYING: NOT AT ALL
3. WORRYING TOO MUCH ABOUT DIFFERENT THINGS: NOT AT ALL
5. BEING SO RESTLESS THAT IT IS HARD TO SIT STILL: SEVERAL DAYS
7. FEELING AFRAID AS IF SOMETHING AWFUL MIGHT HAPPEN: NOT AT ALL
IF YOU CHECKED OFF ANY PROBLEMS ON THIS QUESTIONNAIRE, HOW DIFFICULT HAVE THESE PROBLEMS MADE IT FOR YOU TO DO YOUR WORK, TAKE CARE OF THINGS AT HOME, OR GET ALONG WITH OTHER PEOPLE: SOMEWHAT DIFFICULT
7. FEELING AFRAID AS IF SOMETHING AWFUL MIGHT HAPPEN: NOT AT ALL
1. FEELING NERVOUS, ANXIOUS, OR ON EDGE: SEVERAL DAYS

## 2023-03-03 ASSESSMENT — ENCOUNTER SYMPTOMS
DIZZINESS: 0
EYE PAIN: 0
MYALGIAS: 0
HEMATURIA: 0
HEMATOCHEZIA: 0
HEADACHES: 0
CHILLS: 0
PALPITATIONS: 0
HEARTBURN: 0
DYSURIA: 0
ABDOMINAL PAIN: 0
NAUSEA: 1
FREQUENCY: 0
FEVER: 0
WEAKNESS: 0
DIARRHEA: 1
CONSTIPATION: 0
COUGH: 0
SORE THROAT: 0
JOINT SWELLING: 0
PARESTHESIAS: 0
NERVOUS/ANXIOUS: 1
SHORTNESS OF BREATH: 0
ARTHRALGIAS: 0

## 2023-03-03 ASSESSMENT — PATIENT HEALTH QUESTIONNAIRE - PHQ9
SUM OF ALL RESPONSES TO PHQ QUESTIONS 1-9: 6
SUM OF ALL RESPONSES TO PHQ QUESTIONS 1-9: 6
10. IF YOU CHECKED OFF ANY PROBLEMS, HOW DIFFICULT HAVE THESE PROBLEMS MADE IT FOR YOU TO DO YOUR WORK, TAKE CARE OF THINGS AT HOME, OR GET ALONG WITH OTHER PEOPLE: SOMEWHAT DIFFICULT

## 2023-03-03 ASSESSMENT — PAIN SCALES - GENERAL: PAINLEVEL: NO PAIN (0)

## 2023-03-03 NOTE — LETTER
March 13, 2023      Morro Coronel  [address] [place] [address]        Dear ,    We are writing to inform you of your test results.    A1c ( diabetes screen) is now consistent with PRE-diabetes. As in you don't have diabetes but at higher risk of developing diabetes. Cholesterol is also higher than last year. This is most likely in the setting of recent stress, diet. Increasing exercise levels, cutting out simple cards in your diet ( including pasta, white breads, potatoes, alcohol) will help imrpove this number. Will repeat in 3 months.      Rest of the bloodwork is stable.     Resulted Orders   CBC with platelets   Result Value Ref Range    WBC Count 6.4 4.0 - 11.0 10e3/uL    RBC Count 5.02 4.40 - 5.90 10e6/uL    Hemoglobin 14.4 13.3 - 17.7 g/dL    Hematocrit 42.3 40.0 - 53.0 %    MCV 84 78 - 100 fL    MCH 28.7 26.5 - 33.0 pg    MCHC 34.0 31.5 - 36.5 g/dL    RDW 12.3 10.0 - 15.0 %    Platelet Count 281 150 - 450 10e3/uL   Comprehensive metabolic panel (BMP + Alb, Alk Phos, ALT, AST, Total. Bili, TP)   Result Value Ref Range    Sodium 140 136 - 145 mmol/L    Potassium 4.3 3.4 - 5.3 mmol/L    Chloride 102 98 - 107 mmol/L    Carbon Dioxide (CO2) 25 22 - 29 mmol/L    Anion Gap 13 7 - 15 mmol/L    Urea Nitrogen 12.2 6.0 - 20.0 mg/dL    Creatinine 0.94 0.67 - 1.17 mg/dL    Calcium 9.7 8.6 - 10.0 mg/dL    Glucose 100 (H) 70 - 99 mg/dL    Alkaline Phosphatase 49 40 - 129 U/L    AST 25 10 - 50 U/L    ALT 17 10 - 50 U/L    Protein Total 7.6 6.4 - 8.3 g/dL    Albumin 5.2 3.5 - 5.2 g/dL    Bilirubin Total 0.4 <=1.2 mg/dL    GFR Estimate >90 >60 mL/min/1.73m2      Comment:      eGFR calculated using 2021 CKD-EPI equation.   Hemoglobin A1c   Result Value Ref Range    Hemoglobin A1C 5.7 (H) 0.0 - 5.6 %      Comment:      Normal <5.7%   Prediabetes 5.7-6.4%    Diabetes 6.5% or higher     Note: Adopted from ADA consensus guidelines.   TSH with free T4 reflex   Result Value Ref Range    TSH 2.35  0.30 - 4.20 uIU/mL       If you have any questions or concerns, please call the clinic at the number listed above.       Sincerely,      Yudith James, DO

## 2023-03-03 NOTE — PROGRESS NOTES
SUBJECTIVE:   CC: Morro is an 37 year old who presents for preventative health visit.     Patient has been advised of split billing requirements and indicates understanding: Yes     Healthy Habits:     Getting at least 3 servings of Calcium per day:  Yes    Bi-annual eye exam:  NO    Dental care twice a year:  Yes    Sleep apnea or symptoms of sleep apnea:  Daytime drowsiness and Excessive snoring    Diet:  Regular (no restrictions)    Frequency of exercise:  None    Taking medications regularly:  Yes    Medication side effects:  None    PHQ-2 Total Score: 2    Additional concerns today:  No        Today's PHQ-2 Score:   PHQ-2 ( 1999 Pfizer) 3/3/2023   Q1: Little interest or pleasure in doing things 1   Q2: Feeling down, depressed or hopeless 1   PHQ-2 Score 2   PHQ-2 Total Score (12-17 Years)- Positive if 3 or more points; Administer PHQ-A if positive -   Q1: Little interest or pleasure in doing things Several days   Q2: Feeling down, depressed or hopeless Several days   PHQ-2 Score 2           Social History     Tobacco Use     Smoking status: Never     Passive exposure: Never     Smokeless tobacco: Never   Substance Use Topics     Alcohol use: Yes     Alcohol/week: 11.0 standard drinks     Comment: 1-3 drinks/week     Alcohol Use 3/3/2023   Prescreen: >3 drinks/day or >7 drinks/week? Yes   AUDIT SCORE  14     AUDIT - Alcohol Use Disorders Identification Test - Reproduced from the World Health Organization Audit 2001 (Second Edition) 3/3/2023   1.  How often do you have a drink containing alcohol? 4 or more times a week   2.  How many drinks containing alcohol do you have on a typical day when you are drinking? 1 or 2   3.  How often do you have five or more drinks on one occasion? Less than monthly   4.  How often during the last year have you found that you were not able to stop drinking once you had started? Monthly   5.  How often during the last year have you failed to do what was normally expected of you  because of drinking? Never   6.  How often during the last year have you needed a first drink in the morning to get yourself going after a heavy drinking session? Never   7.  How often during the last year have you had a feeling of guilt or remorse after drinking? Weekly   8.  How often during the last year have you been unable to remember what happened the night before because of your drinking? Never   9.  Have you or someone else been injured because of your drinking? No   10. Has a relative, friend, doctor or other health care worker been concerned about your drinking or suggested you cut down? Yes, during the last year   TOTAL SCORE 14       Last PSA: No results found for: PSA    Reviewed orders with patient. Reviewed health maintenance and updated orders accordingly - Yes  Lab work is in process  Labs reviewed in EPIC  BP Readings from Last 3 Encounters:   03/03/23 126/72   06/20/22 115/70   05/02/22 92/62    Wt Readings from Last 3 Encounters:   03/03/23 82.6 kg (182 lb 3.2 oz)   06/20/22 78.5 kg (173 lb 1.6 oz)   05/02/22 80.8 kg (178 lb 1.6 oz)                  Patient Active Problem List   Diagnosis     CARDIOVASCULAR SCREENING; LDL GOAL LESS THAN 160     Alcohol use     Eczema, unspecified type     Anxiety     Past Surgical History:   Procedure Laterality Date     thumb surgery      as a young child       Social History     Tobacco Use     Smoking status: Never     Passive exposure: Never     Smokeless tobacco: Never   Substance Use Topics     Alcohol use: Yes     Alcohol/week: 11.0 standard drinks     Comment: 1-3 drinks/week     Family History   Problem Relation Age of Onset     Hypertension Father      Depression Father      Diabetes Maternal Grandmother      Coronary Artery Disease Paternal Grandmother      Diabetes Maternal Uncle      Coronary Artery Disease Paternal Uncle          Current Outpatient Medications   Medication Sig Dispense Refill     buPROPion (WELLBUTRIN XL) 150 MG 24 hr tablet Take 1  tablet (150 mg) by mouth every morning 90 tablet 0     cetirizine (ZYRTEC) 10 MG tablet Take 10 mg by mouth daily       fluticasone (FLONASE) 50 MCG/ACT nasal spray Spray 1 spray into both nostrils daily       sertraline (ZOLOFT) 100 MG tablet Take 1 tablet (100 mg) by mouth daily 90 tablet 0     triamcinolone (KENALOG) 0.1 % external ointment Apply topically 2 times daily 80 g 0     Allergies   Allergen Reactions     No Known Allergies        Reviewed and updated as needed this visit by clinical staff   Tobacco  Allergies  Meds              Reviewed and updated as needed this visit by Provider                 Past Medical History:   Diagnosis Date     Atopic dermatitis     left arm, hands, eyelids     Seasonal allergies       Past Surgical History:   Procedure Laterality Date     thumb surgery      as a young child       Review of Systems   Constitutional: Negative for chills and fever.   HENT: Negative for congestion, ear pain, hearing loss and sore throat.    Eyes: Negative for pain and visual disturbance.   Respiratory: Negative for cough and shortness of breath.    Cardiovascular: Negative for chest pain, palpitations and peripheral edema.   Gastrointestinal: Positive for diarrhea and nausea. Negative for abdominal pain, constipation, heartburn and hematochezia.   Genitourinary: Negative for dysuria, frequency, genital sores, hematuria, impotence, penile discharge and urgency.   Musculoskeletal: Negative for arthralgias, joint swelling and myalgias.   Skin: Negative for rash.   Neurological: Negative for dizziness, weakness, headaches and paresthesias.   Psychiatric/Behavioral: Negative for mood changes. The patient is nervous/anxious.      CONSTITUTIONAL: NEGATIVE for fever, chills, change in weight  INTEGUMENTARY/SKIN: NEGATIVE for worrisome rashes, moles or lesions  EYES: NEGATIVE for vision changes or irritation  ENT: NEGATIVE for ear, mouth and throat problems  RESP: NEGATIVE for significant cough or  "SOB  CV: NEGATIVE for chest pain, palpitations or peripheral edema  GI: NEGATIVE for nausea, abdominal pain, heartburn, or change in bowel habits   male: negative for dysuria, hematuria, decreased urinary stream, erectile dysfunction, urethral discharge  MUSCULOSKELETAL: NEGATIVE for significant arthralgias or myalgia  NEURO: NEGATIVE for weakness, dizziness or paresthesias  PSYCHIATRIC: NEGATIVE for changes in mood or affect    OBJECTIVE:   /72 (BP Location: Right arm, Patient Position: Sitting, Cuff Size: Adult Regular)   Pulse 75   Temp 98.3  F (36.8  C) (Oral)   Resp 24   Ht 1.892 m (6' 2.5\")   Wt 82.6 kg (182 lb 3.2 oz)   SpO2 99%   BMI 23.08 kg/m      Physical Exam  GENERAL: healthy, alert and no distress  RESP: lungs clear to auscultation - no rales, rhonchi or wheezes  CV: regular rate and rhythm, normal S1 S2, no S3 or S4, no murmur, click or rub, no peripheral edema and peripheral pulses strong  MS: no gross musculoskeletal defects noted, no edema  PSYCH: mentation appears normal, affect normal    ASSESSMENT/PLAN:   Morro was seen today for physical.    Diagnoses and all orders for this visit:      ICD-10-CM    1. Routine general medical examination at a health care facility  Z00.00 CBC with platelets     Comprehensive metabolic panel (BMP + Alb, Alk Phos, ALT, AST, Total. Bili, TP)     Hemoglobin A1c     TSH with free T4 reflex     Lipid Profile (Chol, Trig, HDL, LDL calc)      2. Family history of attention deficit hyperactivity disorder (ADHD)  Z81.8 Adult Neuropsychology Referral      3. Difficulty concentrating  R41.840 Adult Neuropsychology Referral     buPROPion (WELLBUTRIN XL) 150 MG 24 hr tablet      4. Moderate episode of recurrent major depressive disorder (H)  F33.1 buPROPion (WELLBUTRIN XL) 150 MG 24 hr tablet      5. Alcohol use  Z78.9         Patient is a 37-year-old male with PMH of alcohol use, eczema, anxiety who presents today for an annual physical.    Home life wife + " "kids   Occupation:stable but busy   Sexually active: Yes  Safety concerns:no  Diet/exercise: diet is poor recently 2/2 busy schedule   Family history of cancers:no   Eye exam/dental exam:needs updated eye exam   Alcohol use:see discussion below   Tobacco use/marijuana use/drug use:no   Mental health:see discussion below   Vaccines:UTD   Blood work:ordered     Too young for any of the cancer screenings     Binge alcohol use:  Ongoing issue  Recently, has been very stressed with work and has not been getting good sleep  As a result, he has been drinking 1-2 beers nightly  Did trial naltrexone in the past but it made him \"completely flat\" and he had a lot of stomach side effects.  Does not want to retrial it  Is currently on Zoloft 100 mg  Is aware that he needs to cut down and is aware that stress in his life is a big contributing factor to whether or not he will be drinking or not.  Plan:  - At this point, we are adding Wellbutrin so I will not make any changes to his Zoloft  - We will continue to monitor and he will try to cut down on his own    Family history of ADHD  MDD/JEAN-PAUL  PHQ-9 and JEAN-PAUL-7 scores uptrending  Patient has been very stressed recently because of work  Parenting still tough but overall, he does feel the Zoloft is helping  He does feel there is room for improvement as he does not feel that his anxiety/low mood is adequately controlled  His brother did recently get diagnosed with ADHD and he is wondering if that may be part of the puzzle  Patient does have a lot of difficulty with prioritizing, gets overwhelmed very quickly if he has more than 1 task  He did struggle in high school with finishing assignments on time and getting his homework done.  He does hyperfocus on a single task and will forget about other task while he is doing this  Does not necessarily have any difficulty with reading comprehension  Plan:  - Based on his symptoms and the family history, I think it is reasonable for him to get " tested for ADHD.  Neuropsych referral placed.  - Discussed that instead of increasing Zoloft, we could add Wellbutrin instead to see if that would help with some of his focus issues in addition to his mood symptoms.  Patient is amenable.  - We will see him back in 6 to 8 weeks    PHQ 8/2/2022 9/13/2022 3/3/2023   PHQ-9 Total Score 0 0 6   Q9: Thoughts of better off dead/self-harm past 2 weeks Not at all Not at all Not at all     JEAN-PAUL-7 SCORE 8/2/2022 9/13/2022 3/3/2023   Total Score 1 (minimal anxiety) 0 (minimal anxiety) 4 (minimal anxiety)   Total Score 1 0 4         Patient has been advised of split billing requirements and indicates understanding: Yes      COUNSELING:   Reviewed preventive health counseling, as reflected in patient instructions        He reports that he has never smoked. He has never been exposed to tobacco smoke. He has never used smokeless tobacco.    Yudith James DO  [facility]

## 2023-03-06 DIAGNOSIS — R73.03 PREDIABETES: Primary | ICD-10-CM

## 2023-03-07 ENCOUNTER — TELEPHONE (OUTPATIENT)
Dept: NEUROLOGY | Facility: CLINIC | Age: 38
End: 2023-03-07
Payer: COMMERCIAL

## 2023-03-13 ENCOUNTER — TELEPHONE (OUTPATIENT)
Dept: FAMILY MEDICINE | Facility: CLINIC | Age: 38
End: 2023-03-13
Payer: COMMERCIAL

## 2023-03-13 NOTE — TELEPHONE ENCOUNTER
A1c ( diabetes screen) is now consistent with PRE-diabetes. As in you don't have diabetes but at higher risk of developing diabetes. Cholesterol is also higher than last year. This is most likely in the setting of recent stress, diet. Increasing exercise levels, cutting out simple cards in your diet ( including pasta, white breads, potatoes, alcohol) will help imrpove this number. Will repeat in 3 months.      Rest of the bloodwork is stable.

## 2023-03-13 NOTE — TELEPHONE ENCOUNTER
----- Message from Yudith James DO sent at 3/13/2023  7:49 AM CDT -----  MyC message not read. Please call x 2 and if no response, please send letter.

## 2023-03-13 NOTE — TELEPHONE ENCOUNTER
Left message for patient to call back. When he calls back please results below and assist as needed.    Letter has been sent

## 2023-05-02 ASSESSMENT — ANXIETY QUESTIONNAIRES
GAD7 TOTAL SCORE: 5
6. BECOMING EASILY ANNOYED OR IRRITABLE: SEVERAL DAYS
7. FEELING AFRAID AS IF SOMETHING AWFUL MIGHT HAPPEN: NOT AT ALL
1. FEELING NERVOUS, ANXIOUS, OR ON EDGE: SEVERAL DAYS
IF YOU CHECKED OFF ANY PROBLEMS ON THIS QUESTIONNAIRE, HOW DIFFICULT HAVE THESE PROBLEMS MADE IT FOR YOU TO DO YOUR WORK, TAKE CARE OF THINGS AT HOME, OR GET ALONG WITH OTHER PEOPLE: SOMEWHAT DIFFICULT
GAD7 TOTAL SCORE: 5
8. IF YOU CHECKED OFF ANY PROBLEMS, HOW DIFFICULT HAVE THESE MADE IT FOR YOU TO DO YOUR WORK, TAKE CARE OF THINGS AT HOME, OR GET ALONG WITH OTHER PEOPLE?: SOMEWHAT DIFFICULT
7. FEELING AFRAID AS IF SOMETHING AWFUL MIGHT HAPPEN: NOT AT ALL
GAD7 TOTAL SCORE: 5
2. NOT BEING ABLE TO STOP OR CONTROL WORRYING: NOT AT ALL
5. BEING SO RESTLESS THAT IT IS HARD TO SIT STILL: SEVERAL DAYS
3. WORRYING TOO MUCH ABOUT DIFFERENT THINGS: NOT AT ALL
4. TROUBLE RELAXING: MORE THAN HALF THE DAYS

## 2023-05-02 ASSESSMENT — PATIENT HEALTH QUESTIONNAIRE - PHQ9
10. IF YOU CHECKED OFF ANY PROBLEMS, HOW DIFFICULT HAVE THESE PROBLEMS MADE IT FOR YOU TO DO YOUR WORK, TAKE CARE OF THINGS AT HOME, OR GET ALONG WITH OTHER PEOPLE: SOMEWHAT DIFFICULT
SUM OF ALL RESPONSES TO PHQ QUESTIONS 1-9: 6
SUM OF ALL RESPONSES TO PHQ QUESTIONS 1-9: 6

## 2023-05-05 ENCOUNTER — OFFICE VISIT (OUTPATIENT)
Dept: FAMILY MEDICINE | Facility: CLINIC | Age: 38
End: 2023-05-05
Payer: COMMERCIAL

## 2023-05-05 VITALS
WEIGHT: 179.8 LBS | HEIGHT: 74 IN | DIASTOLIC BLOOD PRESSURE: 62 MMHG | HEART RATE: 89 BPM | TEMPERATURE: 98.8 F | SYSTOLIC BLOOD PRESSURE: 110 MMHG | BODY MASS INDEX: 23.08 KG/M2 | RESPIRATION RATE: 20 BRPM | OXYGEN SATURATION: 98 %

## 2023-05-05 DIAGNOSIS — R41.840 DIFFICULTY CONCENTRATING: ICD-10-CM

## 2023-05-05 DIAGNOSIS — L20.82 FLEXURAL ECZEMA: ICD-10-CM

## 2023-05-05 DIAGNOSIS — F41.1 GAD (GENERALIZED ANXIETY DISORDER): ICD-10-CM

## 2023-05-05 DIAGNOSIS — F10.10 ENGAGES IN BINGE CONSUMPTION OF ALCOHOL: ICD-10-CM

## 2023-05-05 DIAGNOSIS — F33.1 MODERATE EPISODE OF RECURRENT MAJOR DEPRESSIVE DISORDER (H): Primary | ICD-10-CM

## 2023-05-05 PROCEDURE — 99214 OFFICE O/P EST MOD 30 MIN: CPT | Performed by: STUDENT IN AN ORGANIZED HEALTH CARE EDUCATION/TRAINING PROGRAM

## 2023-05-05 RX ORDER — BUPROPION HYDROCHLORIDE 150 MG/1
150 TABLET ORAL EVERY MORNING
Qty: 90 TABLET | Refills: 3 | Status: SHIPPED | OUTPATIENT
Start: 2023-05-05 | End: 2023-10-18

## 2023-05-05 RX ORDER — TRIAMCINOLONE ACETONIDE 1 MG/G
OINTMENT TOPICAL 2 TIMES DAILY
Qty: 80 G | Refills: 3 | Status: SHIPPED | OUTPATIENT
Start: 2023-05-05 | End: 2024-04-19

## 2023-05-05 RX ORDER — SERTRALINE HYDROCHLORIDE 100 MG/1
100 TABLET, FILM COATED ORAL DAILY
Qty: 90 TABLET | Refills: 3 | Status: SHIPPED | OUTPATIENT
Start: 2023-05-05 | End: 2023-10-18

## 2023-05-05 ASSESSMENT — ANXIETY QUESTIONNAIRES
5. BEING SO RESTLESS THAT IT IS HARD TO SIT STILL: NOT AT ALL
GAD7 TOTAL SCORE: 3
IF YOU CHECKED OFF ANY PROBLEMS ON THIS QUESTIONNAIRE, HOW DIFFICULT HAVE THESE PROBLEMS MADE IT FOR YOU TO DO YOUR WORK, TAKE CARE OF THINGS AT HOME, OR GET ALONG WITH OTHER PEOPLE: NOT DIFFICULT AT ALL
7. FEELING AFRAID AS IF SOMETHING AWFUL MIGHT HAPPEN: NOT AT ALL
3. WORRYING TOO MUCH ABOUT DIFFERENT THINGS: NOT AT ALL
2. NOT BEING ABLE TO STOP OR CONTROL WORRYING: NOT AT ALL
6. BECOMING EASILY ANNOYED OR IRRITABLE: SEVERAL DAYS
1. FEELING NERVOUS, ANXIOUS, OR ON EDGE: SEVERAL DAYS

## 2023-05-05 ASSESSMENT — PAIN SCALES - GENERAL: PAINLEVEL: NO PAIN (0)

## 2023-05-05 ASSESSMENT — PATIENT HEALTH QUESTIONNAIRE - PHQ9: 5. POOR APPETITE OR OVEREATING: SEVERAL DAYS

## 2023-05-05 NOTE — PROGRESS NOTES
"  Assessment & Plan       ICD-10-CM    1. Moderate episode of recurrent major depressive disorder (H)  F33.1 buPROPion (WELLBUTRIN XL) 150 MG 24 hr tablet      2. Flexural eczema  L20.82 triamcinolone (KENALOG) 0.1 % external ointment      3. JEAN-PAUL (generalized anxiety disorder)  F41.1 sertraline (ZOLOFT) 100 MG tablet      4. Difficulty concentrating  R41.840 buPROPion (WELLBUTRIN XL) 150 MG 24 hr tablet      5. Engages in binge consumption of alcohol  F10.10             MDD/JEAN-PAUL  Concentration deficit  Family history of ADHD  Alcohol use, binge pattern  PHQ-9 and JEAN-PAUL-7 scores today are stable  No active SI/HI noted  Last time patient was here, PHQ-9 and JEAN-PAUL-7 scores were uptrending.  He was having some difficulty with concentrating.  Was continuing to drink 1-2 beers a night.  Instead of going off on the Zoloft, was given Zoloft to help with concentration and told to follow-up in 2 months.  Today, patient reports that the Wellbutrin with really rough\" 2 on board.  Patient was under a lot of stress at work and had a lot of deadlines to me.  He was quite irritable, felt extremely fatigued and felt like he was \"really struggling\".  Then just this last week, he feels like \"a veil has lifted\" and he is feeling much better.  He feels like he is more focused, mood is slowly starting to improve.  He was grilling this week which is one of his triggers for alcohol but barely took \"any sips\" and then eventually throughout his beer.  Has less cravings for alcohol and feels like he has more time to do things.  He has been getting back into exercising and reading more.  Plan:  - Given the fact that patient started to feel better over the last week, we will hold off on adjusting any of his meds  - We will continue Zoloft and Wellbutrin SR  - We will see him back in 2 to 3 months to see how he is doing  -Continue to monitor/observe what his pattern for alcohol may be and what his triggers are  - Has not called to set up for " "neuropsych testing for possible ADHD.  Says he will do so.    Prediabetes:  Last A1c was 5.7.  We will recheck in another 5 weeks.  He will make a lab only appointment        DO PAM [facility]    Yony Hooker is a 38 year old, presenting for the following health issues:    Follow Up (--mood)            5/5/2023    10:16 AM   Additional Questions   Roomed by Khushbu ROSE CMA   Accompanied by self         5/5/2023    10:16 AM   Patient Reported Additional Medications   Patient reports taking the following new medications na         Social History     Tobacco Use     Smoking status: Never     Passive exposure: Never     Smokeless tobacco: Never   Vaping Use     Vaping status: Never Used     Passive vaping exposure: Yes   Substance Use Topics     Alcohol use: Yes     Alcohol/week: 11.0 standard drinks of alcohol     Comment: 1-3 drinks/week     Drug use: No     Review of Systems   As per HPI       Objective    /62 (BP Location: Right arm, Patient Position: Sitting, Cuff Size: Adult Regular)   Pulse 89   Temp 98.8  F (37.1  C) (Oral)   Resp 20   Ht 1.886 m (6' 2.25\")   Wt 81.6 kg (179 lb 12.8 oz)   SpO2 98%   BMI 22.93 kg/m    Body mass index is 22.93 kg/m .  Physical Exam   GENERAL: healthy, alert and no distress  PSYCH: mentation appears normal, affect anxious but overall appropriate, hopeful      "

## 2023-06-07 ENCOUNTER — LAB (OUTPATIENT)
Dept: LAB | Facility: CLINIC | Age: 38
End: 2023-06-07
Payer: COMMERCIAL

## 2023-06-07 DIAGNOSIS — R73.03 PREDIABETES: ICD-10-CM

## 2023-06-07 LAB — HBA1C MFR BLD: 5.3 % (ref 0–5.6)

## 2023-06-07 PROCEDURE — 36415 COLL VENOUS BLD VENIPUNCTURE: CPT

## 2023-06-07 PROCEDURE — 83036 HEMOGLOBIN GLYCOSYLATED A1C: CPT

## 2023-10-02 ENCOUNTER — IMMUNIZATION (OUTPATIENT)
Dept: NURSING | Facility: CLINIC | Age: 38
End: 2023-10-02
Payer: COMMERCIAL

## 2023-10-02 PROCEDURE — 90471 IMMUNIZATION ADMIN: CPT

## 2023-10-02 PROCEDURE — 90480 ADMN SARSCOV2 VAC 1/ONLY CMP: CPT

## 2023-10-02 PROCEDURE — 91320 SARSCV2 VAC 30MCG TRS-SUC IM: CPT

## 2023-10-02 PROCEDURE — 90686 IIV4 VACC NO PRSV 0.5 ML IM: CPT

## 2023-10-18 ENCOUNTER — OFFICE VISIT (OUTPATIENT)
Dept: FAMILY MEDICINE | Facility: CLINIC | Age: 38
End: 2023-10-18
Payer: COMMERCIAL

## 2023-10-18 VITALS
OXYGEN SATURATION: 96 % | SYSTOLIC BLOOD PRESSURE: 118 MMHG | HEART RATE: 78 BPM | HEIGHT: 74 IN | RESPIRATION RATE: 16 BRPM | DIASTOLIC BLOOD PRESSURE: 72 MMHG | BODY MASS INDEX: 24.64 KG/M2 | WEIGHT: 192 LBS | TEMPERATURE: 98.6 F

## 2023-10-18 DIAGNOSIS — Z78.9 ALCOHOL USE: Primary | ICD-10-CM

## 2023-10-18 DIAGNOSIS — F41.1 GAD (GENERALIZED ANXIETY DISORDER): ICD-10-CM

## 2023-10-18 DIAGNOSIS — F33.1 MODERATE EPISODE OF RECURRENT MAJOR DEPRESSIVE DISORDER (H): ICD-10-CM

## 2023-10-18 LAB
ALBUMIN SERPL BCG-MCNC: 5.2 G/DL (ref 3.5–5.2)
ALP SERPL-CCNC: 44 U/L (ref 40–129)
ALT SERPL W P-5'-P-CCNC: 24 U/L (ref 0–70)
ANION GAP SERPL CALCULATED.3IONS-SCNC: 9 MMOL/L (ref 7–15)
AST SERPL W P-5'-P-CCNC: 24 U/L (ref 0–45)
BILIRUB SERPL-MCNC: 0.5 MG/DL
BUN SERPL-MCNC: 15.4 MG/DL (ref 6–20)
CALCIUM SERPL-MCNC: 9.7 MG/DL (ref 8.6–10)
CHLORIDE SERPL-SCNC: 103 MMOL/L (ref 98–107)
CREAT SERPL-MCNC: 0.97 MG/DL (ref 0.67–1.17)
DEPRECATED HCO3 PLAS-SCNC: 26 MMOL/L (ref 22–29)
EGFRCR SERPLBLD CKD-EPI 2021: >90 ML/MIN/1.73M2
GLUCOSE SERPL-MCNC: 88 MG/DL (ref 70–99)
HBA1C MFR BLD: 5.6 % (ref 0–5.6)
POTASSIUM SERPL-SCNC: 4.5 MMOL/L (ref 3.4–5.3)
PROT SERPL-MCNC: 7.4 G/DL (ref 6.4–8.3)
SODIUM SERPL-SCNC: 138 MMOL/L (ref 135–145)

## 2023-10-18 PROCEDURE — 36415 COLL VENOUS BLD VENIPUNCTURE: CPT | Performed by: STUDENT IN AN ORGANIZED HEALTH CARE EDUCATION/TRAINING PROGRAM

## 2023-10-18 PROCEDURE — 83036 HEMOGLOBIN GLYCOSYLATED A1C: CPT | Performed by: STUDENT IN AN ORGANIZED HEALTH CARE EDUCATION/TRAINING PROGRAM

## 2023-10-18 PROCEDURE — 99214 OFFICE O/P EST MOD 30 MIN: CPT | Performed by: STUDENT IN AN ORGANIZED HEALTH CARE EDUCATION/TRAINING PROGRAM

## 2023-10-18 PROCEDURE — 96127 BRIEF EMOTIONAL/BEHAV ASSMT: CPT | Performed by: STUDENT IN AN ORGANIZED HEALTH CARE EDUCATION/TRAINING PROGRAM

## 2023-10-18 PROCEDURE — 80053 COMPREHEN METABOLIC PANEL: CPT | Performed by: STUDENT IN AN ORGANIZED HEALTH CARE EDUCATION/TRAINING PROGRAM

## 2023-10-18 RX ORDER — SERTRALINE HYDROCHLORIDE 100 MG/1
200 TABLET, FILM COATED ORAL DAILY
Qty: 90 TABLET | Refills: 3 | Status: SHIPPED | OUTPATIENT
Start: 2023-10-18 | End: 2024-04-19

## 2023-10-18 RX ORDER — FINASTERIDE 1 MG/1
TABLET, FILM COATED ORAL
COMMUNITY
Start: 2023-06-13 | End: 2024-06-18

## 2023-10-18 ASSESSMENT — ANXIETY QUESTIONNAIRES
7. FEELING AFRAID AS IF SOMETHING AWFUL MIGHT HAPPEN: SEVERAL DAYS
6. BECOMING EASILY ANNOYED OR IRRITABLE: SEVERAL DAYS
1. FEELING NERVOUS, ANXIOUS, OR ON EDGE: SEVERAL DAYS
5. BEING SO RESTLESS THAT IT IS HARD TO SIT STILL: MORE THAN HALF THE DAYS
2. NOT BEING ABLE TO STOP OR CONTROL WORRYING: SEVERAL DAYS
4. TROUBLE RELAXING: MORE THAN HALF THE DAYS
GAD7 TOTAL SCORE: 8
IF YOU CHECKED OFF ANY PROBLEMS ON THIS QUESTIONNAIRE, HOW DIFFICULT HAVE THESE PROBLEMS MADE IT FOR YOU TO DO YOUR WORK, TAKE CARE OF THINGS AT HOME, OR GET ALONG WITH OTHER PEOPLE: SOMEWHAT DIFFICULT
3. WORRYING TOO MUCH ABOUT DIFFERENT THINGS: NOT AT ALL
GAD7 TOTAL SCORE: 8

## 2023-10-18 ASSESSMENT — PAIN SCALES - GENERAL: PAINLEVEL: NO PAIN (0)

## 2023-10-18 NOTE — PROGRESS NOTES
"  Assessment & Plan       ICD-10-CM    1. Alcohol use  Z78.9 Adult Mental Health  Referral     Hemoglobin A1c     Comprehensive metabolic panel (BMP + Alb, Alk Phos, ALT, AST, Total. Bili, TP)      2. JEAN-PAUL (generalized anxiety disorder)  F41.1 sertraline (ZOLOFT) 100 MG tablet     Adult Mental Health  Referral      3. Moderate episode of recurrent major depressive disorder (H)  F33.1               Alcohol use  MDD/JEAN-PAUL  PHQ-9 score is 4  JEAN-PAUL-7 score is 8  No SI/HI  Last outpatient in May and had been doing well on Zoloft and sertraline  Today, patient notes that things have been \"worse\"  He feels like he is \"not in control\" and in a \"free fall\"  Everything feels \"overwhelming\"  Has been having a hard time keeping up on the tasks and has \"to do list\"  He has more irritable  Feels that he becomes stressed more easily  No acute or specific life stressors that he attributes to feeling anxious about  He is also been drinking more.  Up until a month ago, he was drinking 2-3 either beers or cocktails nightly.  Sleep is okay  Is also upset that his diet has been poor and he has been gaining weight because of that  Notes that he discontinued the Wellbutrin about 2 to 3 months ago because it was making him feel \"crazy\".  Was making him feel more anxious and more irritable.  Felt better once he came off of it.  Plan:  - Supportive listening provided  - I think patient would benefit from therapy in addition to medication management.  He is amenable.  Referral placed to addiction medicine today.  - We will increase Zoloft to 200 mg.  Historically, has not done well on naltrexone because it made him feel \"completely flat\".  Did approach him with the idea of potentially starting acamprosate but he would like to hold off on that  - We will follow up with him in 6 weeks for mood  - I do think that there may be an ADHD component to some of his symptoms and would benefit from neuropsychiatric testing.  The referral is " "already in and the number has been given to him again on his AVS  -Reviewed that alcohol can actually disrupt his sleep and make his anxiety worse.  He is receptive to this information  - We set a goal today that he would minimize drinking alone.  If he is going to drink, he will drink around his wife so that she can help him \"limit intake\".  -We will check CMP and A1c      DO PAM [facility]    Yony Hooker is a 38 year old, presenting for the following health issues:  follow up mood       10/18/2023     8:50 AM   Additional Questions   Roomed by royce       Review of Systems   As per HPI      Objective    /72 (BP Location: Right arm, Patient Position: Sitting)   Pulse 78   Temp 98.6  F (37  C) (Oral)   Resp 16   Ht 6' 2\" (1.88 m)   Wt 192 lb (87.1 kg)   SpO2 96%   BMI 24.65 kg/m    Body mass index is 24.65 kg/m .  Physical Exam   GENERAL: healthy, alert and no distress  PSYCH: mentation appears normal, affect normal, intermittently anxious and restless                       "

## 2023-10-23 ENCOUNTER — VIRTUAL VISIT (OUTPATIENT)
Dept: PSYCHOLOGY | Facility: CLINIC | Age: 38
End: 2023-10-23
Payer: COMMERCIAL

## 2023-10-23 DIAGNOSIS — Z78.9 ALCOHOL USE: ICD-10-CM

## 2023-10-23 DIAGNOSIS — F41.1 GAD (GENERALIZED ANXIETY DISORDER): ICD-10-CM

## 2023-10-23 PROCEDURE — 90791 PSYCH DIAGNOSTIC EVALUATION: CPT | Mod: 95

## 2023-10-23 ASSESSMENT — PATIENT HEALTH QUESTIONNAIRE - PHQ9
10. IF YOU CHECKED OFF ANY PROBLEMS, HOW DIFFICULT HAVE THESE PROBLEMS MADE IT FOR YOU TO DO YOUR WORK, TAKE CARE OF THINGS AT HOME, OR GET ALONG WITH OTHER PEOPLE: SOMEWHAT DIFFICULT
SUM OF ALL RESPONSES TO PHQ QUESTIONS 1-9: 5
SUM OF ALL RESPONSES TO PHQ QUESTIONS 1-9: 5

## 2023-10-30 NOTE — PROGRESS NOTES
"[facility] Counseling      PATIENT'S NAME: Morro Coronel  PREFERRED NAME: Morro  PRONOUNS:   he/him    MRN: [id number]  : 1985  ADDRESS: [address] [place], [place] [address]T. NUMBER:  [id number]  DATE OF SERVICE: 10/23/23  START TIME: 9:00am  END TIME: 9:45am  PREFERRED PHONE: [phone]  May we leave a program related message: Yes  SERVICE MODALITY:  Video Visit:      Provider verified identity through the following two step process.  Patient provided:  Patient  and Patient address    Telemedicine Visit: The patient's condition can be safely assessed and treated via synchronous audio and visual telemedicine encounter.      Reason for Telemedicine Visit: Patient has requested telehealth visit    Originating Site (Patient Location): Patient's home    Distant Site (Provider Location): Provider Remote Setting- Home Office    Consent:  The patient/guardian has verbally consented to: the potential risks and benefits of telemedicine (video visit) versus in person care; bill my insurance or make self-payment for services provided; and responsibility for payment of non-covered services.     Patient would like the video invitation sent by:  My Chart    Mode of Communication:  Video Conference via tinyclues    Distant Location (Provider):  Off-site    As the provider I attest to compliance with applicable laws and regulations related to telemedicine.    UNIVERSAL ADULT Mental Health DIAGNOSTIC ASSESSMENT    Identifying Information:  Patient is a 38 year old,   individual.  Patient was referred for an assessment by primary care provider.  Patient attended the session alone.    Chief Complaint:   The reason for seeking services at this time is: \"Anxiety, alcohol use\".  The problem(s) began 03/15/20. Patient reports alcohol use has been an issue since college, anxiety his whole life.    Patient has attempted to resolve these concerns in the past through therapy, zoloft, not " "having things I enjoy drinking on hand, having alternatives .    Social/Family History:  Patient reported they grew up in other [facility].  They were raised by biological parents  .  Parents  / . Patient was 11 when his parents  and his parents had joint custody but he lived with his mom. Patient reported that their childhood was \"dad suffers from depression and anxiety, never felt unsafe but parents fought with contempt a lot so it was hard growing up--a lot of pain and sadness.\" Patient has one younger brother who is 4 years younger than him. Patient described their current relationships with family of origin as \"great\". Parents still live in their childhood home and brother lives in [place].    The patient describes their cultural background as --Kazakh, Kyrgyz, Pakistani, Tam[place]. Cultural influences and impact on patient's life structure, values, norms, and healthcare: We went to an [employer], [facility] [employer] [facility]-aff[employer], Rastafari from when I was 6 or 7. I attended that Rastafari with my mom up until right before I got  at age 28. I converted to Catholicism at age 28 but since the pandemic we have not attended Mass but a handful of times. I would consider myself without a Mosque now. Contextual influences on patient's health include: Contextual Factors: Individual Factors anxiety throughout his life, white male and Family Factors , has two children .    These factors will be addressed in the Preliminary Treatment plan. Patient identified their preferred language to be English. Patient reported they does not need the assistance of an  or other support involved in therapy.     Patient reported had no significant delays in developmental tasks.   Patient's highest education level was college graduate  .  Patient identified the following learning problems: attention and concentration.  Modifications will not be used to assist communication in " "therapy.  Patient reports they are  able to understand written materials.    Patient reported the following relationship history .  Patient's current relationship status is  for 10 years.   Patient identified their sexual orientation as heterosexual.  Patient reported having 2 child(jeanie). Two daughters (5, 8). Patient reports a \"great\" relationship with his immediate family. Patient identified partner; parents; siblings as part of their support system.  Patient identified the quality of these relationships as good,  .      Patient's current living/housing situation involves staying in own home/apartment.  The immediate members of family and household include Patricia Coronel, 38,Spouse  and they report that housing is stable.    Patient is currently employed fulltime. 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 Patient reports their finances are obtained through employment. Patient does not identify finances as a current stressor.      Patient reported that they have not been involved with the legal system.    . Patient does not report being under probation/ parole/ jurisdiction. They are not under any current court jurisdiction. .    Patient's Strengths and Limitations:  Patient identified the following strengths or resources that will help them succeed in treatment:  loving and caring, feel empathy toward others, if I do something I try to do it well, good at thinking of needs of others . Things that may interfere with the patient's success in treatment include:  family gatherings for alcohol use .     Assessments:  The following assessments were completed by patient for this visit:  PHQ9:       11/15/2021    12:07 PM 8/2/2022     6:47 AM 9/13/2022     6:41 AM 3/3/2023    10:03 AM 5/2/2023     6:49 AM 5/5/2023    11:08 AM 10/23/2023     8:42 AM   PHQ-9 SCORE   PHQ-9 Total Score MyChart 0 0 0 6 (Mild depression) 6 (Mild depression)  5 (Mild depression)   PHQ-9 Total Score 0 0 0 6 6 4 5 "     GAD7:       6/20/2022     8:29 AM 8/2/2022     6:47 AM 9/13/2022     6:42 AM 3/3/2023    10:04 AM 5/2/2023     6:49 AM 5/5/2023    11:08 AM 10/18/2023     8:31 AM   JEAN-PAUL-7 SCORE   Total Score 2 (minimal anxiety) 1 (minimal anxiety) 0 (minimal anxiety) 4 (minimal anxiety) 5 (mild anxiety)  8 (mild anxiety)   Total Score 2 1 0 4 5 3 8     CAGE-AID:       11/15/2021    12:21 PM 10/23/2023     8:57 AM   CAGE-AID Total Score   Total Score 3 2   Total Score MyChart 3 (A total score of 2 or greater is considered clinically significant) 2 (A total score of 2 or greater is considered clinically significant)     PROMIS 10-Global Health (all questions and answers displayed):       12/16/2021     8:02 AM 10/23/2023     8:57 AM   PROMIS 10   In general, would you say your health is: Very good Good   In general, would you say your quality of life is: Very good Excellent   In general, how would you rate your physical health? Good Good   In general, how would you rate your mental health, including your mood and your ability to think? Good Fair   In general, how would you rate your satisfaction with your social activities and relationships? Very good Good   In general, please rate how well you carry out your usual social activities and roles Excellent Good   To what extent are you able to carry out your everyday physical activities such as walking, climbing stairs, carrying groceries, or moving a chair? Completely Completely   In the past 7 days, how often have you been bothered by emotional problems such as feeling anxious, depressed, or irritable? Sometimes Sometimes   In the past 7 days, how would you rate your fatigue on average? Moderate Moderate   In the past 7 days, how would you rate your pain on average, where 0 means no pain, and 10 means worst imaginable pain? 6 0   In general, would you say your health is: 4 3   In general, would you say your quality of life is: 4 5   In general, how would you rate your physical  health? 3 3   In general, how would you rate your mental health, including your mood and your ability to think? 3 2   In general, how would you rate your satisfaction with your social activities and relationships? 4 3   In general, please rate how well you carry out your usual social activities and roles. (This includes activities at home, at work and in your community, and responsibilities as a parent, child, spouse, employee, friend, etc.) 5 3   To what extent are you able to carry out your everyday physical activities such as walking, climbing stairs, carrying groceries, or moving a chair? 5 5   In the past 7 days, how often have you been bothered by emotional problems such as feeling anxious, depressed, or irritable? 3 3   In the past 7 days, how would you rate your fatigue on average? 3 3   In the past 7 days, how would you rate your pain on average, where 0 means no pain, and 10 means worst imaginable pain? 6 0   Global Mental Health Score 14 13   Global Physical Health Score 14 16   PROMIS TOTAL - SUBSCORES 28 29     [place] Suicide Severity Rating Scale (Short Version)      10/23/2023     9:05 AM   [place] Suicide Severity Rating (Short Version)   1. Wish to be Dead (Since Last Contact) N   2. Non-Specific Active Suicidal Thoughts (Since Last Contact) N   Actual Attempt (Since Last Contact) N   Has subject engaged in non-suicidal self-injurious behavior? (Since Last Contact) N   Interrupted Attempts (Since Last Contact) N   Aborted or Self-Interrupted Attempt (Since Last Contact) N   Preparatory Acts or Behavior (Since Last Contact) N   Suicide (Since Last Contact) N   Calculated C-SSRS Risk Score (Since Last Contact) No Risk Indicated       Personal and Family Medical History:  Patient does report a family history of mental health concerns.  Patient reports family history includes Coronary Artery Disease in his paternal grandmother and paternal uncle; Depression in his father; Diabetes in his maternal  grandmother and maternal uncle; Hypertension in his father.. Brother ADHD, anxiety.    Patient does report Mental Health Diagnosis and/or Treatment.  Patient Patient reported the following previous diagnoses which include(s): an anxiety disorder .  Patient reported symptoms began in childhood.  Patient has received mental health services in the past:  therapy  .  Psychiatric Hospitalizations: none.  Patient denies a history of civil commitment.  Currently, patient none  receiving other mental health services.  These include none.       Patient has had a physical exam to rule out medical causes for current symptoms.  Date of last physical exam was within the past year. Client was encouraged to follow up with PCP if symptoms were to develop. The patient has a [place] Primary Care Provider, who is named Yudith James..  Patient reports no current medical concerns.  Patient denies any issues with pain..   There are significant appetite / nutritional concerns / weight changes. These may include: gain of weight (20 lbs). Patient reports the following sleep concerns:  No concerns.   Patient does not report a history of head injury / trauma / cognitive impairment.      Current Outpatient Medications   Medication    cetirizine (ZYRTEC) 10 MG tablet    finasteride (PROPECIA) 1 MG tablet    fluticasone (FLONASE) 50 MCG/ACT nasal spray    sertraline (ZOLOFT) 100 MG tablet    triamcinolone (KENALOG) 0.1 % external ointment     No current facility-administered medications for this visit.        Medication Adherence:  Patient reports taking prescribed medications as prescribed.    Patient Allergies:    Allergies   Allergen Reactions    No Known Allergies        Medical History:    Past Medical History:   Diagnosis Date    Atopic dermatitis     left arm, hands, eyelids    Seasonal allergies          Current Mental Status Exam:   Appearance:  Appropriate    Eye Contact:  Good   Psychomotor:  Normal       Gait / station:  Unable  to assess-video visit  Attitude / Demeanor: Cooperative   Speech      Rate / Production: Normal/ Responsive      Volume:  Normal  volume      Language:  no problems  Mood:   Anxious   Affect:   Worrisome    Thought Content: Clear   Thought Process: Coherent       Associations: No loosening of associations  Insight:   Good   Judgment:  Intact   Orientation:  All  Attention/concentration: Good    Substance Use:  Patient did report a family history of substance use concerns; see medical history section for details.  Patient has not received chemical dependency treatment in the past.  Patient has not ever been to detox.      Patient is not currently receiving any chemical dependency treatment.           Substance History of use Age of first use Date of last use     Pattern and duration of use (include amounts and frequency)   Alcohol currently use   18 01/01/03 REPORTS SUBSTANCE USE: reports using substance 5 times per three weeks and has 2 beers at a time.   Patient reports heaviest use was previous to three weeks ago-- 1-2 drinks five nights a week.   Cannabis   used in the past 19 09/20/23 REPORTS SUBSTANCE USE: N/A     Amphetamines   never used     REPORTS SUBSTANCE USE: N/A   Cocaine/crack    never used       REPORTS SUBSTANCE USE: N/A   Hallucinogens never used         REPORTS SUBSTANCE USE: N/A   Inhalants never used         REPORTS SUBSTANCE USE: N/A   Heroin never used         REPORTS SUBSTANCE USE: N/A   Other Opiates never used     REPORTS SUBSTANCE USE: N/A   Benzodiazepine   never used     REPORTS SUBSTANCE USE: N/A   Barbiturates never used     REPORTS SUBSTANCE USE: N/A   Over the counter meds never used     REPORTS SUBSTANCE USE: N/A   Caffeine currently use 15   REPORTS SUBSTANCE USE: reports using substance 2 times per day and has 1 coffee at a time.   Patient reports heaviest use is current use.   Nicotine  never used     REPORTS SUBSTANCE USE: N/A   Other substances not listed above:  Identify:   never used     REPORTS SUBSTANCE USE: N/A     Patient reported the following problems as a result of their substance use: no problems, not applicable.    Substance Use: No symptoms    Based on the positive CAGE score and clinical interview there  are indications of drug or alcohol abuse. Recommendation for substance abuse disorder evaluation with a substance use professional was given. Therapist did recommend client to reduce use or abstain from alcohol or substance use. Therapist did recommend structured treatment and or community support (AA, 12 step group, etc.).   . Patient would like to try therapy and is currently reducing alcohol intake and obtaining support from spouse.    Significant Losses / Trauma / Abuse / Neglect Issues:   Patient did not  serve in the .  There are indications or report of significant loss, trauma, abuse or neglect issues related to: are no indications and client denies any losses, trauma, abuse, or neglect concerns.  Concerns for possible neglect are not present.     Safety Assessment:   Patient denies current homicidal ideation and behaviors.  Patient denies current self-injurious ideation and behaviors.    Patient denied risk behaviors associated with substance use.  Patient denies any high risk behaviors associated with mental health symptoms.  Patient reports the following current concerns for their personal safety: None.  Patient reports there are not firearms in the house.       There are no firearms in the home..    History of Safety Concerns:  Patient denied a history of homicidal ideation.     Patient denied a history of personal safety concerns.    Patient denied a history of assaultive behaviors.    Patient denied a history of sexual assault behaviors.     Patient denied a history of risk behaviors associated with substance use.  Patient denies any history of high risk behaviors associated with mental health symptoms.  Patient reports the following protective  factors: forward or future oriented thinking; dedication to family or friends; safe and stable environment; regular sleep; effectively controls impulses; regular physical activity; sense of belonging; purpose; secure attachment; abstinence from substances; agreement to use safety plan; living with other people; daily obligations; structured day; effective problem solving skills; commitment to well being; sense of meaning; positive social skills; financial stability; strong sense of self worth or esteem; sense of personal control or determination    Risk Plan:  See Recommendations for Safety and Risk Management Plan    Review of Symptoms per patient report:   Depression: Lack of interest, Change in energy level, Difficulties concentrating, and Feeling sad, down, or depressed  Rosey:  No Symptoms  Psychosis: No Symptoms  Anxiety: Excessive worry, Nervousness, Social anxiety, Sleep disturbance, Psychomotor agitation, Ruminations, Poor concentration, and Irritability  Panic:  Pt reports he had some during 2020  Post Traumatic Stress Disorder:  No Symptoms   Eating Disorder: No Symptoms  ADD / ADHD:  Inattentive, Poor task completion, Poor organizational skills, Distractibility, and Forgetful  Conduct Disorder: No symptoms  Autism Spectrum Disorder: No symptoms  Obsessive Compulsive Disorder: No Symptoms    Patient reports the following compulsive behaviors and treatment history:  None .      Diagnostic Criteria:   Generalized Anxiety Disorder  A. Excessive anxiety and worry about a number of events or activities (such as work or school performance).   B. The person finds it difficult to control the worry.  C. Select 3 or more symptoms (required for diagnosis). Only one item is required in children.   - Restlessness or feeling keyed up or on edge.    - Being easily fatigued.    - Difficulty concentrating or mind going blank.    - Irritability.    - Sleep disturbance (difficulty falling or staying asleep, or restless  unsatisfying sleep).   D. The focus of the anxiety and worry is not confined to features of an Axis I disorder.  E. The anxiety, worry, or physical symptoms cause clinically significant distress or impairment in social, occupational, or other important areas of functioning.   F. The disturbance is not due to the direct physiological effects of a substance (e.g., a drug of abuse, a medication) or a general medical condition (e.g., hyperthyroidism) and does not occur exclusively during a Mood Disorder, a Psychotic Disorder, or a Pervasive Developmental Disorder.    Functional Status:  Patient reports the following functional impairments:  health maintenance, relationship(s), self-care, and work / vocational responsibilities.     Nonprogrammatic care:  Patient is requesting basic services to address current mental health concerns.    Clinical Summary:  1. Reason for assessment: referred by primary care provider for symptoms of anxiety  .  2. Psychosocial, Cultural and Contextual Factors: , 2 children, lifelong anxiety, struggles with alcohol use, owns his own business, 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  3. Principal DSM5 Diagnoses  (Sustained by DSM5 Criteria Listed Above):   300.02 (F41.1) Generalized Anxiety Disorder.  4. Other Diagnoses that is relevant to services:   None  5. Provisional Diagnosis:  Substance-Related & Addictive Disorders Alcohol Use Disorder   305.00 (F10.10) Mild    as evidenced by pt's reports of pattern of alcohol use since college, wanting to cut down and not being able to and feeling guilty about substance use. Patient is currently cutting down on alcohol and has strong support from spouse. Will continue to assess.  6. Prognosis: Expect Improvement.  7. Likely consequences of symptoms if not treated: worsening anxiety and and alcohol use.  8. Client strengths include:  caring, empathetic, has a previous history of therapy, intelligent, motivated, and responsible parent .  "    Recommendations:     1. Plan for Safety and Risk Management:   Safety and Risk: Recommended that patient call 911 or go to the local ED should there be a change in any of these risk factors..          Report to child / adult protection services was NA.     2. Patient's identified scott / Druze / spiritual influences will be incorporated into care by being cognizant of patient's Yarsani upbringing and current lack of Yarsani belief .     3. Initial Treatment will focus on:    Anxiety - reducing anxiety symptoms .     4. Resources/Service Plan:    services are not indicated.   Modifications to assist communication are not indicated.   Additional disability accommodations are not indicated.      5. Collaboration:   Collaboration / coordination of treatment will be initiated with the following  support professionals: primary care physician.      6.  Referrals:   The following referral(s) will be initiated:  none at this time . Next Scheduled Appointment: 11/2/2023.      A Release of Information has been obtained for the following:  None .     Clinical Substantiation/medical necessity for the above recommendations:  N/A.    7. DAVID:    DAVID:  Discussed the general effects of drugs and alcohol on health and well-being. Recommendations:  Will monitor substance use in collaboration with patient.     8. Records:   These were not available for review at time of assessment.   Information in this assessment was obtained from the medical record and  provided by patient who is a good historian.    Patient will have open access to their mental health medical record.    9.   Interactive Complexity: No    10. Safety Plan:   Patient had \"No Risk Indicated\" on current [place] Assessment    Provider Name/ Credentials:  BARBARA Singleton [facility]  October 23, 2023  Assessment reviewed and clinical supervision by BARBARA Alvarez, [facility] 10/31/2023        "

## 2023-11-02 ENCOUNTER — VIRTUAL VISIT (OUTPATIENT)
Dept: PSYCHOLOGY | Facility: CLINIC | Age: 38
End: 2023-11-02
Payer: COMMERCIAL

## 2023-11-02 DIAGNOSIS — F41.1 GAD (GENERALIZED ANXIETY DISORDER): Primary | ICD-10-CM

## 2023-11-02 PROCEDURE — 90834 PSYTX W PT 45 MINUTES: CPT | Mod: VID

## 2023-11-02 NOTE — PROGRESS NOTES
[facility]                                     Progress Note    Patient Name: Morro Coronel  Date: 11/02/23          Service Type: Individual      Session Start Time: 9:05am  Session End Time: 9:43am     Session Length: 38 minutes    Session #: 2    Attendees: Client    Service Modality:  Video Visit:      Provider verified identity through the following two step process.  Patient provided:  Patient is known previously to provider    Telemedicine Visit: The patient's condition can be safely assessed and treated via synchronous audio and visual telemedicine encounter.      Reason for Telemedicine Visit: Patient has requested telehealth visit    Originating Site (Patient Location): Patient's home    Distant Site (Provider Location): [facility]    Consent:  The patient/guardian has verbally consented to: the potential risks and benefits of telemedicine (video visit) versus in person care; bill my insurance or make self-payment for services provided; and responsibility for payment of non-covered services.     Patient would like the video invitation sent by:  My Chart    Mode of Communication:  Video Conference via Amwell    Distant Location (Provider):  Off-site    As the provider I attest to compliance with applicable laws and regulations related to telemedicine.    DATA  Interactive Complexity: No  Crisis: No        Progress Since Last Session (Related to Symptoms / Goals / Homework):   Symptoms: Improving pt reports reduced alcohol consumption    Homework: Achieved / completed to satisfaction      Episode of Care Goals: Satisfactory progress - ACTION (Actively working towards change); Intervened by reinforcing change plan / affirming steps taken     Current / Ongoing Stressors and Concerns:   Pt reports being able to be cognizant of alcohol consumption this week and that feeling good. Pt reports wanting to explore anxiety and alcohol use. Pt reports  noticing being reserved in thoughts and feelings.     Treatment Objective(s) Addressed in This Session:   Patient will identify 2 fears / thoughts that contribute to feeling anxious.  Patient will understand how anxiety and alcohol use interact     Intervention:   Discussed goals for therapy   Created space for pt to process stressors   Assisted pt in identifying and processing feelings and emotions    Assessments completed prior to visit:  The following assessments were completed by patient for this visit:  PHQ9:       11/15/2021    12:07 PM 8/2/2022     6:47 AM 9/13/2022     6:41 AM 3/3/2023    10:03 AM 5/2/2023     6:49 AM 5/5/2023    11:08 AM 10/23/2023     8:42 AM   PHQ-9 SCORE   PHQ-9 Total Score MyChart 0 0 0 6 (Mild depression) 6 (Mild depression)  5 (Mild depression)   PHQ-9 Total Score 0 0 0 6 6 4 5     GAD7:       6/20/2022     8:29 AM 8/2/2022     6:47 AM 9/13/2022     6:42 AM 3/3/2023    10:04 AM 5/2/2023     6:49 AM 5/5/2023    11:08 AM 10/18/2023     8:31 AM   JEAN-PAUL-7 SCORE   Total Score 2 (minimal anxiety) 1 (minimal anxiety) 0 (minimal anxiety) 4 (minimal anxiety) 5 (mild anxiety)  8 (mild anxiety)   Total Score 2 1 0 4 5 3 8         ASSESSMENT: Current Emotional / Mental Status (status of significant symptoms):   Risk status (Self / Other harm or suicidal ideation)   Patient denies current fears or concerns for personal safety.   Patient denies current or recent suicidal ideation or behaviors.   Patient denies current or recent homicidal ideation or behaviors.   Patient denies current or recent self injurious behavior or ideation.   Patient denies other safety concerns.   Patient reports there has been no change in risk factors since their last session.     Patient reports there has been no change in protective factors since their last session.     Recommended that patient call 911 or go to the local ED should there be a change in any of these risk factors.     Appearance:   Appropriate    Eye  Contact:   Good    Psychomotor Behavior: Normal    Attitude:   Cooperative  Guarded    Orientation:   All   Speech    Rate / Production: Normal     Volume:  Normal    Mood:    Anxious    Affect:    Appropriate    Thought Content:  Clear    Thought Form:  Coherent    Insight:    Good      Medication Review:   No changes to current psychiatric medication(s)     Medication Compliance:   Yes     Changes in Health Issues:   None reported     Chemical Use Review:   Substance Use: decrease in alcohol .  Patient reports frequency of use 4 times in the last 3 weeks.  Discussed what led to success in the past three weeks        Tobacco Use: No current tobacco use.      Diagnosis:  1. JEAN-PAUL (generalized anxiety disorder)        Collateral Reports Completed:   Not Applicable    PLAN: (Patient Tasks / Therapist Tasks / Other)  Patient will journal to mindfully process during the week    Continue weekly therapy to process stressors and stabilize anxiety        Odalys Tavarez, [facility] November 2, 2023  Note reviewed and clinical supervision by BARBARA Alvarez, [facility] 11/2/2023                                                          ______________________________________________________________________    Individual Treatment Plan    Patient's Name: Morro Coronel  YOB: 1985    Date of Creation: 11/2/2023  Date Treatment Plan Last Reviewed/Revised: 11/2/2023    DSM5 Diagnoses: 300.02 (F41.1) Generalized Anxiety Disorder  Psychosocial / Contextual Factors: , 2 children, lifelong anxiety, struggles with alcohol use, owns his own business, 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   PROMIS (reviewed every 90 days):   PROMIS-10 Scores        12/16/2021     8:02 AM 10/23/2023     8:57 AM   PROMIS-10 Total Score w/o Sub Scores   PROMIS TOTAL - SUBSCORES 28 29      Referral / Collaboration:  Referral to another professional/service is not indicated at this time..    Anticipated number of session for this episode of  care: 9-12 sessions  Anticipation frequency of session: Weekly  Anticipated Duration of each session: 38-52 minutes  Treatment plan will be reviewed in 90 days or when goals have been changed.       MeasurableTreatment Goal(s) related to diagnosis / functional impairment(s)  Goal 1: Patient will reduce anxiety symptoms    I will know I've met my goal when break down my history with alcohol use, understand why, and feel comfortable not using alcohol as an anxiety reducer. I would like to feel more comfortable talking with family about the use more. I'd also like to understand and reduce anxiety      Objective #A (Patient Action)    Patient will identify 2 fears / thoughts that contribute to feeling anxious.  Status: New - Date: 11/2/2023      Intervention(s)  Therapist will help patient explore and understand contributions to anxiety    Objective #B  Patient will understand how anxiety and alcohol use interact  Status: New - Date: 11/2/2023      Intervention(s)  Therapist will help patient explore and understand interaction between anxiety and alcohol use    Patient has reviewed and agreed to the above plan.      Odalys Tavarez, FELIPA  November 2, 2023   Treatment plan reviewed and clinical supervision by BARBARA Alvarez, [facility] 11/2/2023

## 2023-11-06 ENCOUNTER — VIRTUAL VISIT (OUTPATIENT)
Dept: PSYCHOLOGY | Facility: CLINIC | Age: 38
End: 2023-11-06
Payer: COMMERCIAL

## 2023-11-06 DIAGNOSIS — F41.1 GAD (GENERALIZED ANXIETY DISORDER): Primary | ICD-10-CM

## 2023-11-06 PROCEDURE — 90834 PSYTX W PT 45 MINUTES: CPT | Mod: VID

## 2023-11-06 NOTE — PROGRESS NOTES
[facility]                                     Progress Note    Patient Name: Morro Coronel  Date: 11/06/23          Service Type: Individual      Session Start Time: 9:00am  Session End Time: 9:44am     Session Length: 44 minutes    Session #: 3    Attendees: Client    Service Modality:  Video Visit:      Provider verified identity through the following two step process.  Patient provided:  Patient is known previously to provider    Telemedicine Visit: The patient's condition can be safely assessed and treated via synchronous audio and visual telemedicine encounter.      Reason for Telemedicine Visit: Patient has requested telehealth visit    Originating Site (Patient Location): Patient's place of employment    Distant Site (Provider Location): Provider Remote Setting- Home Office    Consent:  The patient/guardian has verbally consented to: the potential risks and benefits of telemedicine (video visit) versus in person care; bill my insurance or make self-payment for services provided; and responsibility for payment of non-covered services.     Patient would like the video invitation sent by:  My Chart    Mode of Communication:  Video Conference via Amwell    Distant Location (Provider):  Off-site    As the provider I attest to compliance with applicable laws and regulations related to telemedicine.    DATA  Interactive Complexity: No  Crisis: No        Progress Since Last Session (Related to Symptoms / Goals / Homework):   Symptoms: Improving pt reports reduced anxiety and alcohol consumption    Homework: Achieved / completed to satisfaction      Episode of Care Goals: Satisfactory progress - ACTION (Actively working towards change); Intervened by reinforcing change plan / affirming steps taken     Current / Ongoing Stressors and Concerns:   Pt reports continuing to be cognizant of alcohol consumption and that feeling good. Pt reports some difficulty with emotions and a feeling of  not being good enough.     Treatment Objective(s) Addressed in This Session:   Patient will identify 2 fears / thoughts that contribute to feeling anxious.  Patient will understand how anxiety and alcohol use interact     Intervention:  Created space for pt to process stressors  Assisted pt in identifying and processing feelings and emotions and connection to childhood    Assessments completed prior to visit:  The following assessments were completed by patient for this visit:  PHQ9:       11/15/2021    12:07 PM 8/2/2022     6:47 AM 9/13/2022     6:41 AM 3/3/2023    10:03 AM 5/2/2023     6:49 AM 5/5/2023    11:08 AM 10/23/2023     8:42 AM   PHQ-9 SCORE   PHQ-9 Total Score MyChart 0 0 0 6 (Mild depression) 6 (Mild depression)  5 (Mild depression)   PHQ-9 Total Score 0 0 0 6 6 4 5     GAD7:       6/20/2022     8:29 AM 8/2/2022     6:47 AM 9/13/2022     6:42 AM 3/3/2023    10:04 AM 5/2/2023     6:49 AM 5/5/2023    11:08 AM 10/18/2023     8:31 AM   JEAN-PAUL-7 SCORE   Total Score 2 (minimal anxiety) 1 (minimal anxiety) 0 (minimal anxiety) 4 (minimal anxiety) 5 (mild anxiety)  8 (mild anxiety)   Total Score 2 1 0 4 5 3 8         ASSESSMENT: Current Emotional / Mental Status (status of significant symptoms):   Risk status (Self / Other harm or suicidal ideation)   Patient denies current fears or concerns for personal safety.   Patient denies current or recent suicidal ideation or behaviors.   Patient denies current or recent homicidal ideation or behaviors.   Patient denies current or recent self injurious behavior or ideation.   Patient denies other safety concerns.   Patient reports there has been no change in risk factors since their last session.     Patient reports there has been no change in protective factors since their last session.     Recommended that patient call 911 or go to the local ED should there be a change in any of these risk factors.     Appearance:   Appropriate    Eye Contact:   Good    Psychomotor  Behavior: Normal    Attitude:   Cooperative  Guarded    Orientation:   All   Speech    Rate / Production: Normal     Volume:  Normal    Mood:    Anxious    Affect:    Appropriate    Thought Content:  Clear    Thought Form:  Coherent    Insight:    Good      Medication Review:   No changes to current psychiatric medication(s)     Medication Compliance:   Yes     Changes in Health Issues:   None reported     Chemical Use Review:   Substance Use: decrease in alcohol .  Patient reports frequency of use 4 times in the last 3 weeks.  Discussed what led to success in the past three weeks        Tobacco Use: No current tobacco use.      Diagnosis:  1. JEAN-PAUL (generalized anxiety disorder)          Collateral Reports Completed:   Not Applicable    PLAN: (Patient Tasks / Therapist Tasks / Other)  Patient will journal to mindfully process during the week    Continue weekly therapy to process stressors and stabilize anxiety        Odalys Tavarez, [facility] November 6, 2023  Note reviewed and clinical supervision by BARBARA Alvarez, [facility] 11/6/2023                                                          ______________________________________________________________________    Individual Treatment Plan    Patient's Name: Morro Coronel  YOB: 1985    Date of Creation: 11/2/2023  Date Treatment Plan Last Reviewed/Revised: 11/2/2023    DSM5 Diagnoses: 300.02 (F41.1) Generalized Anxiety Disorder  Psychosocial / Contextual Factors: , 2 children, lifelong anxiety, struggles with alcohol use, owns his own business, 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   PROMIS (reviewed every 90 days):   PROMIS-10 Scores        12/16/2021     8:02 AM 10/23/2023     8:57 AM   PROMIS-10 Total Score w/o Sub Scores   PROMIS TOTAL - SUBSCORES 28 29      Referral / Collaboration:  Referral to another professional/service is not indicated at this time..    Anticipated number of session for this episode of care: 9-12  sessions  Anticipation frequency of session: Weekly  Anticipated Duration of each session: 38-52 minutes  Treatment plan will be reviewed in 90 days or when goals have been changed.       MeasurableTreatment Goal(s) related to diagnosis / functional impairment(s)  Goal 1: Patient will reduce anxiety symptoms    I will know I've met my goal when break down my history with alcohol use, understand why, and feel comfortable not using alcohol as an anxiety reducer. I would like to feel more comfortable talking with family about the use more. I'd also like to understand and reduce anxiety      Objective #A (Patient Action)    Patient will identify 2 fears / thoughts that contribute to feeling anxious.  Status: New - Date: 11/2/2023      Intervention(s)  Therapist will help patient explore and understand contributions to anxiety    Objective #B  Patient will understand how anxiety and alcohol use interact  Status: New - Date: 11/2/2023      Intervention(s)  Therapist will help patient explore and understand interaction between anxiety and alcohol use    Patient has reviewed and agreed to the above plan.      Odalys Tavarez, IRENE  November 2, 2023   Treatment plan reviewed and clinical supervision [facility]

## 2023-11-14 ENCOUNTER — VIRTUAL VISIT (OUTPATIENT)
Dept: PSYCHOLOGY | Facility: CLINIC | Age: 38
End: 2023-11-14
Payer: COMMERCIAL

## 2023-11-14 DIAGNOSIS — F41.1 GAD (GENERALIZED ANXIETY DISORDER): Primary | ICD-10-CM

## 2023-11-14 PROCEDURE — 90834 PSYTX W PT 45 MINUTES: CPT | Mod: VID

## 2023-11-14 NOTE — PROGRESS NOTES
[facility]                                     Progress Note    Patient Name: Morro Coronel  Date: 11/14/23          Service Type: Individual      Session Start Time: 9:01am  Session End Time: 9:47am     Session Length: 46 minutes    Session #: 4    Attendees: Client    Service Modality:  Video Visit:      Provider verified identity through the following two step process.  Patient provided:  Patient is known previously to provider    Telemedicine Visit: The patient's condition can be safely assessed and treated via synchronous audio and visual telemedicine encounter.      Reason for Telemedicine Visit: Patient has requested telehealth visit    Originating Site (Patient Location): Patient's place of employment    Distant Site (Provider Location): Provider Remote Setting- Home Office    Consent:  The patient/guardian has verbally consented to: the potential risks and benefits of telemedicine (video visit) versus in person care; bill my insurance or make self-payment for services provided; and responsibility for payment of non-covered services.     Patient would like the video invitation sent by:  My Chart    Mode of Communication:  Video Conference via Amwell    Distant Location (Provider):  Off-site    As the provider I attest to compliance with applicable laws and regulations related to telemedicine.    DATA  Interactive Complexity: No  Crisis: No        Progress Since Last Session (Related to Symptoms / Goals / Homework):   Symptoms: Improving pt reports reduced anxiety and alcohol consumption    Homework: Achieved / completed to satisfaction      Episode of Care Goals: Satisfactory progress - ACTION (Actively working towards change); Intervened by reinforcing change plan / affirming steps taken     Current / Ongoing Stressors and Concerns:   Pt reports continuing mindfulness around alcohol and that feeling good. Pt reports having some thoughts around a lack of confidence in himself  over the years.     Treatment Objective(s) Addressed in This Session:   Patient will identify 2 fears / thoughts that contribute to feeling anxious.  Patient will understand how anxiety and alcohol use interact     Intervention:  Created space for pt to process stressors  Assisted pt in identifying and processing feelings and emotions and connection to childhood    Assessments completed prior to visit:  The following assessments were completed by patient for this visit:  PHQ9:       11/15/2021    12:07 PM 8/2/2022     6:47 AM 9/13/2022     6:41 AM 3/3/2023    10:03 AM 5/2/2023     6:49 AM 5/5/2023    11:08 AM 10/23/2023     8:42 AM   PHQ-9 SCORE   PHQ-9 Total Score MyChart 0 0 0 6 (Mild depression) 6 (Mild depression)  5 (Mild depression)   PHQ-9 Total Score 0 0 0 6 6 4 5     GAD7:       6/20/2022     8:29 AM 8/2/2022     6:47 AM 9/13/2022     6:42 AM 3/3/2023    10:04 AM 5/2/2023     6:49 AM 5/5/2023    11:08 AM 10/18/2023     8:31 AM   JEAN-PAUL-7 SCORE   Total Score 2 (minimal anxiety) 1 (minimal anxiety) 0 (minimal anxiety) 4 (minimal anxiety) 5 (mild anxiety)  8 (mild anxiety)   Total Score 2 1 0 4 5 3 8         ASSESSMENT: Current Emotional / Mental Status (status of significant symptoms):   Risk status (Self / Other harm or suicidal ideation)   Patient denies current fears or concerns for personal safety.   Patient denies current or recent suicidal ideation or behaviors.   Patient denies current or recent homicidal ideation or behaviors.   Patient denies current or recent self injurious behavior or ideation.   Patient denies other safety concerns.   Patient reports there has been no change in risk factors since their last session.     Patient reports there has been no change in protective factors since their last session.     Recommended that patient call 911 or go to the local ED should there be a change in any of these risk factors.     Appearance:   Appropriate    Eye Contact:   Good    Psychomotor  Behavior: Normal    Attitude:   Cooperative  Guarded    Orientation:   All   Speech    Rate / Production: Normal     Volume:  Normal    Mood:    Anxious    Affect:    Appropriate    Thought Content:  Clear    Thought Form:  Coherent    Insight:    Good      Medication Review:   No changes to current psychiatric medication(s)     Medication Compliance:   Yes     Changes in Health Issues:   None reported     Chemical Use Review:   Substance Use: decrease in alcohol .  Patient reports frequency of use 4 times in the last 3 weeks.  Discussed what led to success in the past three weeks        Tobacco Use: No current tobacco use.      Diagnosis:  1. JEAN-PAUL (generalized anxiety disorder)            Collateral Reports Completed:   Not Applicable    PLAN: (Patient Tasks / Therapist Tasks / Other)  Patient will journal to mindfully process during the week    Continue weekly therapy to process stressors and stabilize anxiety      Odalys Tavarez, [facility] November 14, 2023  Note reviewed and clinical supervision by BARBARA Alvarez, [facility] 11/14/2023                                                          ______________________________________________________________________    Individual Treatment Plan    Patient's Name: Morro Coronel  YOB: 1985    Date of Creation: 11/2/2023  Date Treatment Plan Last Reviewed/Revised: 11/2/2023    DSM5 Diagnoses: 300.02 (F41.1) Generalized Anxiety Disorder  Psychosocial / Contextual Factors: , 2 children, lifelong anxiety, struggles with alcohol use, owns his own business, 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   PROMIS (reviewed every 90 days):   PROMIS-10 Scores        12/16/2021     8:02 AM 10/23/2023     8:57 AM   PROMIS-10 Total Score w/o Sub Scores   PROMIS TOTAL - SUBSCORES 28 29      Referral / Collaboration:  Referral to another professional/service is not indicated at this time..    Anticipated number of session for this episode of care: 9-12  sessions  Anticipation frequency of session: Weekly  Anticipated Duration of each session: 38-52 minutes  Treatment plan will be reviewed in 90 days or when goals have been changed.       MeasurableTreatment Goal(s) related to diagnosis / functional impairment(s)  Goal 1: Patient will reduce anxiety symptoms    I will know I've met my goal when break down my history with alcohol use, understand why, and feel comfortable not using alcohol as an anxiety reducer. I would like to feel more comfortable talking with family about the use more. I'd also like to understand and reduce anxiety      Objective #A (Patient Action)    Patient will identify 2 fears / thoughts that contribute to feeling anxious.  Status: New - Date: 11/2/2023      Intervention(s)  Therapist will help patient explore and understand contributions to anxiety    Objective #B  Patient will understand how anxiety and alcohol use interact  Status: New - Date: 11/2/2023      Intervention(s)  Therapist will help patient explore and understand interaction between anxiety and alcohol use    Patient has reviewed and agreed to the above plan.      Odalys Tavarez, IRENE  November 2, 2023   Treatment plan reviewed and clinical supervision by BAR[facility]A [facility], [facility]/2/[facility]

## 2023-11-20 ENCOUNTER — VIRTUAL VISIT (OUTPATIENT)
Dept: PSYCHOLOGY | Facility: CLINIC | Age: 38
End: 2023-11-20
Payer: COMMERCIAL

## 2023-11-20 DIAGNOSIS — F41.1 GAD (GENERALIZED ANXIETY DISORDER): Primary | ICD-10-CM

## 2023-11-20 PROCEDURE — 90834 PSYTX W PT 45 MINUTES: CPT | Mod: VID

## 2023-11-20 NOTE — PROGRESS NOTES
[facility]                                     Progress Note    Patient Name: Morro Coronel  Date: 11/20/23          Service Type: Individual      Session Start Time: 9:02am  Session End Time: 9:47am     Session Length: 45 minutes    Session #: 5    Attendees: Client    Service Modality:  Video Visit:      Provider verified identity through the following two step process.  Patient provided:  Patient is known previously to provider    Telemedicine Visit: The patient's condition can be safely assessed and treated via synchronous audio and visual telemedicine encounter.      Reason for Telemedicine Visit: Patient has requested telehealth visit    Originating Site (Patient Location): Patient's place of employment    Distant Site (Provider Location): Provider Remote Setting- Home Office    Consent:  The patient/guardian has verbally consented to: the potential risks and benefits of telemedicine (video visit) versus in person care; bill my insurance or make self-payment for services provided; and responsibility for payment of non-covered services.     Patient would like the video invitation sent by:  My Chart    Mode of Communication:  Video Conference via Amwell    Distant Location (Provider):  Off-site    As the provider I attest to compliance with applicable laws and regulations related to telemedicine.    DATA  Interactive Complexity: No  Crisis: No        Progress Since Last Session (Related to Symptoms / Goals / Homework):   Symptoms: Improving pt reports reduced anxiety and alcohol consumption    Homework: Achieved / completed to satisfaction      Episode of Care Goals: Satisfactory progress - ACTION (Actively working towards change); Intervened by reinforcing change plan / affirming steps taken     Current / Ongoing Stressors and Concerns:   Pt reports continued mindfulness around alcohol and that feeling good. Pt reports some worry around alcohol and holidays. Pt reports history of  social anxiety.     Treatment Objective(s) Addressed in This Session:   Patient will identify 2 fears / thoughts that contribute to feeling anxious.  Patient will understand how anxiety and alcohol use interact     Intervention:  Created space for pt to process stressors  Assisted pt in identifying and processing feelings and emotions  Assisted pt in conducting behavioral chain analysis around drinking      Assessments completed prior to visit:  The following assessments were completed by patient for this visit:  PHQ9:       11/15/2021    12:07 PM 8/2/2022     6:47 AM 9/13/2022     6:41 AM 3/3/2023    10:03 AM 5/2/2023     6:49 AM 5/5/2023    11:08 AM 10/23/2023     8:42 AM   PHQ-9 SCORE   PHQ-9 Total Score MyChart 0 0 0 6 (Mild depression) 6 (Mild depression)  5 (Mild depression)   PHQ-9 Total Score 0 0 0 6 6 4 5     GAD7:       6/20/2022     8:29 AM 8/2/2022     6:47 AM 9/13/2022     6:42 AM 3/3/2023    10:04 AM 5/2/2023     6:49 AM 5/5/2023    11:08 AM 10/18/2023     8:31 AM   JEAN-PAUL-7 SCORE   Total Score 2 (minimal anxiety) 1 (minimal anxiety) 0 (minimal anxiety) 4 (minimal anxiety) 5 (mild anxiety)  8 (mild anxiety)   Total Score 2 1 0 4 5 3 8         ASSESSMENT: Current Emotional / Mental Status (status of significant symptoms):   Risk status (Self / Other harm or suicidal ideation)   Patient denies current fears or concerns for personal safety.   Patient denies current or recent suicidal ideation or behaviors.   Patient denies current or recent homicidal ideation or behaviors.   Patient denies current or recent self injurious behavior or ideation.   Patient denies other safety concerns.   Patient reports there has been no change in risk factors since their last session.     Patient reports there has been no change in protective factors since their last session.     Recommended that patient call 911 or go to the local ED should there be a change in any of these risk factors.     Appearance:   Appropriate    Eye  Contact:   Good    Psychomotor Behavior: Normal    Attitude:   Cooperative    Orientation:   All   Speech    Rate / Production: Normal     Volume:  Normal    Mood:    Anxious    Affect:    Appropriate    Thought Content:  Clear    Thought Form:  Coherent    Insight:    Good      Medication Review:   No changes to current psychiatric medication(s)     Medication Compliance:   Yes     Changes in Health Issues:   None reported     Chemical Use Review:   Substance Use: decrease in alcohol .  Patient reports frequency of use 4 times in the last 3 weeks.  Discussed what led to success in the past three weeks        Tobacco Use: No current tobacco use.      Diagnosis:  1. JEAN-PAUL (generalized anxiety disorder)        Collateral Reports Completed:   Not Applicable    PLAN: (Patient Tasks / Therapist Tasks / Other)  Patient will journal to mindfully process during the week    Continue weekly therapy to process stressors and stabilize anxiety      Odalys Tavarez, [facility] November 20. 2023  Note reviewed and clinical supervision by BARBARA Alvarez, [facility] 11/20/2023                                                          ______________________________________________________________________    Individual Treatment Plan    Patient's Name: Morro Coronel  YOB: 1985    Date of Creation: 11/2/2023  Date Treatment Plan Last Reviewed/Revised: 11/2/2023    DSM5 Diagnoses: 300.02 (F41.1) Generalized Anxiety Disorder  Psychosocial / Contextual Factors: , 2 children, lifelong anxiety, struggles with alcohol use, owns his own business, 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   PROMIS (reviewed every 90 days):   PROMIS-10 Scores        12/16/2021     8:02 AM 10/23/2023     8:57 AM   PROMIS-10 Total Score w/o Sub Scores   PROMIS TOTAL - SUBSCORES 28 29      Referral / Collaboration:  Referral to another professional/service is not indicated at this time..    Anticipated number of session for this episode of care: 9-12  sessions  Anticipation frequency of session: Weekly  Anticipated Duration of each session: 38-52 minutes  Treatment plan will be reviewed in 90 days or when goals have been changed.       MeasurableTreatment Goal(s) related to diagnosis / functional impairment(s)  Goal 1: Patient will reduce anxiety symptoms    I will know I've met my goal when break down my history with alcohol use, understand why, and feel comfortable not using alcohol as an anxiety reducer. I would like to feel more comfortable talking with family about the use more. I'd also like to understand and reduce anxiety      Objective #A (Patient Action)    Patient will identify 2 fears / thoughts that contribute to feeling anxious.  Status: New - Date: 11/2/2023      Intervention(s)  Therapist will help patient explore and understand contributions to anxiety    Objective #B  Patient will understand how anxiety and alcohol use interact  Status: New - Date: 11/2/2023      Intervention(s)  Therapist will help patient explore and understand interaction between anxiety and alcohol use    Patient has reviewed and agreed to the above plan.      Odalys Tavarez, IRENE  November 2, 2023   Treatment plan reviewed and clinical supervision by BARBARA Alvarez, [facility] 11/2/2023

## 2023-11-27 ENCOUNTER — VIRTUAL VISIT (OUTPATIENT)
Dept: PSYCHOLOGY | Facility: CLINIC | Age: 38
End: 2023-11-27
Payer: COMMERCIAL

## 2023-11-27 DIAGNOSIS — F41.1 GAD (GENERALIZED ANXIETY DISORDER): Primary | ICD-10-CM

## 2023-11-27 PROCEDURE — 90834 PSYTX W PT 45 MINUTES: CPT | Mod: VID

## 2023-11-27 NOTE — PROGRESS NOTES
[facility]                                     Progress Note    Patient Name: Morro Coronel  Date: 11/27/23          Service Type: Individual      Session Start Time: 9:00am  Session End Time: 9:45am     Session Length: 45 minutes    Session #: 6    Attendees: Client    Service Modality:  Video Visit:      Provider verified identity through the following two step process.  Patient provided:  Patient is known previously to provider    Telemedicine Visit: The patient's condition can be safely assessed and treated via synchronous audio and visual telemedicine encounter.      Reason for Telemedicine Visit: Patient has requested telehealth visit    Originating Site (Patient Location): Patient's place of employment    Distant Site (Provider Location): Provider Remote Setting- Home Office    Consent:  The patient/guardian has verbally consented to: the potential risks and benefits of telemedicine (video visit) versus in person care; bill my insurance or make self-payment for services provided; and responsibility for payment of non-covered services.     Patient would like the video invitation sent by:  My Chart    Mode of Communication:  Video Conference via Amwell    Distant Location (Provider):  Off-site    As the provider I attest to compliance with applicable laws and regulations related to telemedicine.    DATA  Interactive Complexity: No  Crisis: No        Progress Since Last Session (Related to Symptoms / Goals / Homework):   Symptoms: Improving pt reports reduced anxiety and alcohol consumption    Homework: Achieved / completed to satisfaction      Episode of Care Goals: Satisfactory progress - ACTION (Actively working towards change); Intervened by reinforcing change plan / affirming steps taken     Current / Ongoing Stressors and Concerns:   Pt reports continued mindfulness around alcohol use. Pt reports a difficult family evening last night. Pt reports some minimization around  emotions.     Treatment Objective(s) Addressed in This Session:   Patient will identify 2 fears / thoughts that contribute to feeling anxious.  Patient will understand how anxiety and alcohol use interact     Intervention:  Created space for pt to process stressors  Assisted pt in identifying and processing feelings and emotions  Helped pt explore family messages from childhood      Assessments completed prior to visit:  The following assessments were completed by patient for this visit:  PHQ9:       11/15/2021    12:07 PM 8/2/2022     6:47 AM 9/13/2022     6:41 AM 3/3/2023    10:03 AM 5/2/2023     6:49 AM 5/5/2023    11:08 AM 10/23/2023     8:42 AM   PHQ-9 SCORE   PHQ-9 Total Score MyChart 0 0 0 6 (Mild depression) 6 (Mild depression)  5 (Mild depression)   PHQ-9 Total Score 0 0 0 6 6 4 5     GAD7:       6/20/2022     8:29 AM 8/2/2022     6:47 AM 9/13/2022     6:42 AM 3/3/2023    10:04 AM 5/2/2023     6:49 AM 5/5/2023    11:08 AM 10/18/2023     8:31 AM   JEAN-PAUL-7 SCORE   Total Score 2 (minimal anxiety) 1 (minimal anxiety) 0 (minimal anxiety) 4 (minimal anxiety) 5 (mild anxiety)  8 (mild anxiety)   Total Score 2 1 0 4 5 3 8         ASSESSMENT: Current Emotional / Mental Status (status of significant symptoms):   Risk status (Self / Other harm or suicidal ideation)   Patient denies current fears or concerns for personal safety.   Patient denies current or recent suicidal ideation or behaviors.   Patient denies current or recent homicidal ideation or behaviors.   Patient denies current or recent self injurious behavior or ideation.   Patient denies other safety concerns.   Patient reports there has been no change in risk factors since their last session.     Patient reports there has been no change in protective factors since their last session.     Recommended that patient call 911 or go to the local ED should there be a change in any of these risk factors.     Appearance:   Appropriate    Eye Contact:   Good     Psychomotor Behavior: Normal    Attitude:   Cooperative    Orientation:   All   Speech    Rate / Production: Normal     Volume:  Normal    Mood:    Anxious    Affect:    Appropriate    Thought Content:  Clear    Thought Form:  Coherent    Insight:    Good      Medication Review:   No changes to current psychiatric medication(s)     Medication Compliance:   Yes     Changes in Health Issues:   None reported     Chemical Use Review:   Substance Use: decrease in alcohol .  Patient reports frequency of use socially.  Stage of Change: Action          Tobacco Use: No current tobacco use.      Diagnosis:  1. JEAN-PAUL (generalized anxiety disorder)          Collateral Reports Completed:   Not Applicable    PLAN: (Patient Tasks / Therapist Tasks / Other)  Patient will journal to mindfully process during the week    Continue weekly therapy to process stressors and stabilize anxiety      Odalys Tavarez, [facility] November 27, 2023  Note reviewed and clinical supervision by BARBARA Alvarez, [facility] 11/27/2023                                                        ______________________________________________________________________    Individual Treatment Plan    Patient's Name: Morro Coronel  YOB: 1985    Date of Creation: 11/2/2023  Date Treatment Plan Last Reviewed/Revised: 11/2/2023    DSM5 Diagnoses: 300.02 (F41.1) Generalized Anxiety Disorder  Psychosocial / Contextual Factors: , 2 children, lifelong anxiety, struggles with alcohol use, owns his own business, 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   PROMIS (reviewed every 90 days):   PROMIS-10 Scores        12/16/2021     8:02 AM 10/23/2023     8:57 AM   PROMIS-10 Total Score w/o Sub Scores   PROMIS TOTAL - SUBSCORES 28 29      Referral / Collaboration:  Referral to another professional/service is not indicated at this time..    Anticipated number of session for this episode of care: 9-12 sessions  Anticipation frequency of session: Weekly  Anticipated  Duration of each session: 38-52 minutes  Treatment plan will be reviewed in 90 days or when goals have been changed.       MeasurableTreatment Goal(s) related to diagnosis / functional impairment(s)  Goal 1: Patient will reduce anxiety symptoms    I will know I've met my goal when break down my history with alcohol use, understand why, and feel comfortable not using alcohol as an anxiety reducer. I would like to feel more comfortable talking with family about the use more. I'd also like to understand and reduce anxiety      Objective #A (Patient Action)    Patient will identify 2 fears / thoughts that contribute to feeling anxious.  Status: New - Date: 11/2/2023      Intervention(s)  Therapist will help patient explore and understand contributions to anxiety    Objective #B  Patient will understand how anxiety and alcohol use interact  Status: New - Date: 11/2/2023      Intervention(s)  Therapist will help patient explore and understand interaction between anxiety and alcohol use    Patient has reviewed and agreed to the above plan.      Odalys Tavarez, FELIPA  November 2, 2023   Treatment plan reviewed and clinical supervision by BARBARA Alvarez, [facility] 11/2/2023

## 2023-11-28 ENCOUNTER — OFFICE VISIT (OUTPATIENT)
Dept: FAMILY MEDICINE | Facility: CLINIC | Age: 38
End: 2023-11-28
Payer: COMMERCIAL

## 2023-11-28 VITALS
WEIGHT: 196 LBS | HEIGHT: 74 IN | RESPIRATION RATE: 14 BRPM | TEMPERATURE: 98.7 F | OXYGEN SATURATION: 96 % | DIASTOLIC BLOOD PRESSURE: 70 MMHG | HEART RATE: 74 BPM | BODY MASS INDEX: 25.15 KG/M2 | SYSTOLIC BLOOD PRESSURE: 128 MMHG

## 2023-11-28 DIAGNOSIS — Z78.9 ALCOHOL USE: ICD-10-CM

## 2023-11-28 DIAGNOSIS — F33.1 MODERATE EPISODE OF RECURRENT MAJOR DEPRESSIVE DISORDER (H): ICD-10-CM

## 2023-11-28 DIAGNOSIS — F41.1 GAD (GENERALIZED ANXIETY DISORDER): Primary | ICD-10-CM

## 2023-11-28 PROCEDURE — 99213 OFFICE O/P EST LOW 20 MIN: CPT | Performed by: STUDENT IN AN ORGANIZED HEALTH CARE EDUCATION/TRAINING PROGRAM

## 2023-11-28 PROCEDURE — 96127 BRIEF EMOTIONAL/BEHAV ASSMT: CPT | Performed by: STUDENT IN AN ORGANIZED HEALTH CARE EDUCATION/TRAINING PROGRAM

## 2023-11-28 ASSESSMENT — ANXIETY QUESTIONNAIRES
2. NOT BEING ABLE TO STOP OR CONTROL WORRYING: NOT AT ALL
7. FEELING AFRAID AS IF SOMETHING AWFUL MIGHT HAPPEN: NOT AT ALL
6. BECOMING EASILY ANNOYED OR IRRITABLE: NOT AT ALL
4. TROUBLE RELAXING: NOT AT ALL
3. WORRYING TOO MUCH ABOUT DIFFERENT THINGS: NOT AT ALL
5. BEING SO RESTLESS THAT IT IS HARD TO SIT STILL: NOT AT ALL
1. FEELING NERVOUS, ANXIOUS, OR ON EDGE: NOT AT ALL
IF YOU CHECKED OFF ANY PROBLEMS ON THIS QUESTIONNAIRE, HOW DIFFICULT HAVE THESE PROBLEMS MADE IT FOR YOU TO DO YOUR WORK, TAKE CARE OF THINGS AT HOME, OR GET ALONG WITH OTHER PEOPLE: NOT DIFFICULT AT ALL
GAD7 TOTAL SCORE: 0
GAD7 TOTAL SCORE: 0

## 2023-11-28 ASSESSMENT — PATIENT HEALTH QUESTIONNAIRE - PHQ9
SUM OF ALL RESPONSES TO PHQ QUESTIONS 1-9: 1
10. IF YOU CHECKED OFF ANY PROBLEMS, HOW DIFFICULT HAVE THESE PROBLEMS MADE IT FOR YOU TO DO YOUR WORK, TAKE CARE OF THINGS AT HOME, OR GET ALONG WITH OTHER PEOPLE: NOT DIFFICULT AT ALL
SUM OF ALL RESPONSES TO PHQ QUESTIONS 1-9: 1

## 2023-11-28 ASSESSMENT — PAIN SCALES - GENERAL: PAINLEVEL: NO PAIN (0)

## 2023-11-28 NOTE — PROGRESS NOTES
"  Assessment & Plan         ICD-10-CM    1. JEAN-PAUL (generalized anxiety disorder)  F41.1       2. Moderate episode of recurrent major depressive disorder (H)  F33.1       3. Alcohol use  Z78.9           MDD/JEAN-PAUL  Alcohol use  PHQ-9 score1  JEAN-PAUL-7 score 0  No active SI/HI  Last time he was here, had been drinking a bit more.  Had increased his Zoloft to 200 mg and placed referral to addiction medicine.  Had also sent him to neuropsychiatric testing.  Today, patient notes he did start therapy and it has been \"really good for him\".  He is meeting with his therapist on a weekly basis and finds it very helpful  They are exploring his triggers regarding his anxiety and drinking  Though Zoloft increased dose has also been helping.  Does not feel like his anxiety is \"bubbling\" as much in the background.  Overall feels more able to manage his stress  Has significantly cut down on his alcohol use.  Is doing approximately 1 drink a day.  Sleep is still a bit disordered.  He partially notes that is because of young kids waking him up at night or the dog needing to go out   Plan:  - Patient would like to stay with current dose of Zoloft and continue therapy. I think that is reasonable. Will follow up in 2 months.           5/5/2023    11:08 AM 10/23/2023     8:42 AM 11/28/2023     9:42 AM   PHQ   PHQ-9 Total Score 4 5 1   Q9: Thoughts of better off dead/self-harm past 2 weeks Not at all Not at all Not at all           5/5/2023    11:08 AM 10/18/2023     8:31 AM 11/28/2023     9:42 AM   JEAN-PAUL-7 SCORE   Total Score  8 (mild anxiety) 0 (minimal anxiety)   Total Score 3 8 0     DO [facility]    Yony Hooker is a 38 year old, presenting for the following health issues:  RECHECK (Medication check. )      11/28/2023     9:58 AM   Additional Questions   Roomed by royce       Review of Systems   As per HPI       Objective    /70 (BP Location: Right arm, Patient Position: Sitting)   Pulse 74 " "  Temp 98.7  F (37.1  C) (Oral)   Resp 14   Ht 6' 2\" (1.88 m)   Wt 196 lb (88.9 kg)   SpO2 96%   BMI 25.16 kg/m    Body mass index is 25.16 kg/m .  Physical Exam   GENERAL: healthy, alert and no distress  MS: no gross musculoskeletal defects noted, no edema  PSYCH: mentation appears normal, affect  slightly anxious but overall appropriate. Linear thought process.                   "

## 2023-12-05 ENCOUNTER — VIRTUAL VISIT (OUTPATIENT)
Dept: PSYCHOLOGY | Facility: CLINIC | Age: 38
End: 2023-12-05
Payer: COMMERCIAL

## 2023-12-05 DIAGNOSIS — F41.1 GAD (GENERALIZED ANXIETY DISORDER): Primary | ICD-10-CM

## 2023-12-05 PROCEDURE — 90834 PSYTX W PT 45 MINUTES: CPT | Mod: VID

## 2023-12-05 NOTE — PROGRESS NOTES
[facility]                                     Progress Note    Patient Name: Morro Coronel  Date: 12/05/23          Service Type: Individual      Session Start Time: 9:00am  Session End Time: 9:45am     Session Length: 45 minutes    Session #: 7    Attendees: Client    Service Modality:  Video Visit:      Provider verified identity through the following two step process.  Patient provided:  Patient is known previously to provider    Telemedicine Visit: The patient's condition can be safely assessed and treated via synchronous audio and visual telemedicine encounter.      Reason for Telemedicine Visit: Patient has requested telehealth visit    Originating Site (Patient Location): Patient's place of employment    Distant Site (Provider Location): Provider Remote Setting- Home Office    Consent:  The patient/guardian has verbally consented to: the potential risks and benefits of telemedicine (video visit) versus in person care; bill my insurance or make self-payment for services provided; and responsibility for payment of non-covered services.     Patient would like the video invitation sent by:  My Chart    Mode of Communication:  Video Conference via Amwell    Distant Location (Provider):  Off-site    As the provider I attest to compliance with applicable laws and regulations related to telemedicine.    DATA  Interactive Complexity: No  Crisis: No        Progress Since Last Session (Related to Symptoms / Goals / Homework):   Symptoms: No change pt reports some difficulty with family interactions    Homework: Achieved / completed to satisfaction      Episode of Care Goals: Satisfactory progress - ACTION (Actively working towards change); Intervened by reinforcing change plan / affirming steps taken     Current / Ongoing Stressors and Concerns:   Pt reports having some difficulty with interactions with his wife. Pt reports a lack of open communication and having difficulty with finding  "time with two children. Pt reports history of being the \"strong\" one emotionally.     Treatment Objective(s) Addressed in This Session:   Patient will identify 2 fears / thoughts that contribute to feeling anxious.  Patient will understand how anxiety and alcohol use interact     Intervention:  Created space for pt to process stressors  Assisted pt in identifying and processing feelings and emotions  Helped pt explore thoughts and feelings around connection with wife and ways to improve communication      Assessments completed prior to visit:  The following assessments were completed by patient for this visit:  PHQ9:       8/2/2022     6:47 AM 9/13/2022     6:41 AM 3/3/2023    10:03 AM 5/2/2023     6:49 AM 5/5/2023    11:08 AM 10/23/2023     8:42 AM 11/28/2023     9:42 AM   PHQ-9 SCORE   PHQ-9 Total Score MyChart 0 0 6 (Mild depression) 6 (Mild depression)  5 (Mild depression) 1 (Minimal depression)   PHQ-9 Total Score 0 0 6 6 4 5 1     GAD7:       8/2/2022     6:47 AM 9/13/2022     6:42 AM 3/3/2023    10:04 AM 5/2/2023     6:49 AM 5/5/2023    11:08 AM 10/18/2023     8:31 AM 11/28/2023     9:42 AM   JEAN-PAUL-7 SCORE   Total Score 1 (minimal anxiety) 0 (minimal anxiety) 4 (minimal anxiety) 5 (mild anxiety)  8 (mild anxiety) 0 (minimal anxiety)   Total Score 1 0 4 5 3 8 0         ASSESSMENT: Current Emotional / Mental Status (status of significant symptoms):   Risk status (Self / Other harm or suicidal ideation)   Patient denies current fears or concerns for personal safety.   Patient denies current or recent suicidal ideation or behaviors.   Patient denies current or recent homicidal ideation or behaviors.   Patient denies current or recent self injurious behavior or ideation.   Patient denies other safety concerns.   Patient reports there has been no change in risk factors since their last session.     Patient reports there has been no change in protective factors since their last session.     Recommended that patient " call 911 or go to the local ED should there be a change in any of these risk factors.     Appearance:   Appropriate    Eye Contact:   Good    Psychomotor Behavior: Normal    Attitude:   Cooperative    Orientation:   All   Speech    Rate / Production: Normal     Volume:  Normal    Mood:    Anxious    Affect:    Appropriate  Subdued  Worrisome    Thought Content:  Clear    Thought Form:  Coherent    Insight:    Good      Medication Review:   No changes to current psychiatric medication(s)     Medication Compliance:   Yes     Changes in Health Issues:   None reported     Chemical Use Review:   Substance Use: decrease in alcohol .  Patient reports frequency of use socially.  Stage of Change: Action          Tobacco Use: No current tobacco use.      Diagnosis:  1. JEAN-PAUL (generalized anxiety disorder)            Collateral Reports Completed:   Not Applicable    PLAN: (Patient Tasks / Therapist Tasks / Other)  Patient will journal to mindfully process during the week    Continue weekly therapy to process stressors and stabilize anxiety      Odalys Tavarez, [facility] December 5, 2023  Note reviewed and clinical supervision by BARBARA Alvarez, [facility] 12/6/2023                                                       ______________________________________________________________________    Individual Treatment Plan    Patient's Name: Morro Coronel  YOB: 1985    Date of Creation: 11/2/2023  Date Treatment Plan Last Reviewed/Revised: 11/2/2023    DSM5 Diagnoses: 300.02 (F41.1) Generalized Anxiety Disorder  Psychosocial / Contextual Factors: , 2 children, lifelong anxiety, struggles with alcohol use, owns his own business, 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   PROMIS (reviewed every 90 days):   PROMIS-10 Scores        12/16/2021     8:02 AM 10/23/2023     8:57 AM   PROMIS-10 Total Score w/o Sub Scores   PROMIS TOTAL - SUBSCORES 28 29      Referral / Collaboration:  Referral to another professional/service is  not indicated at this time..    Anticipated number of session for this episode of care: 9-12 sessions  Anticipation frequency of session: Weekly  Anticipated Duration of each session: 38-52 minutes  Treatment plan will be reviewed in 90 days or when goals have been changed.       MeasurableTreatment Goal(s) related to diagnosis / functional impairment(s)  Goal 1: Patient will reduce anxiety symptoms    I will know I've met my goal when break down my history with alcohol use, understand why, and feel comfortable not using alcohol as an anxiety reducer. I would like to feel more comfortable talking with family about the use more. I'd also like to understand and reduce anxiety      Objective #A (Patient Action)    Patient will identify 2 fears / thoughts that contribute to feeling anxious.  Status: New - Date: 11/2/2023      Intervention(s)  Therapist will help patient explore and understand contributions to anxiety    Objective #B  Patient will understand how anxiety and alcohol use interact  Status: New - Date: 11/2/2023      Intervention(s)  Therapist will help patient explore and understand interaction between anxiety and alcohol use    Patient has reviewed and agreed to the above plan.      Odalys Tavarez, FELIPA  November 2, 2023   Treatment plan reviewed and clinical supervision by BARBARA Alvarez, [facility] 11/2/2023

## 2023-12-12 ENCOUNTER — VIRTUAL VISIT (OUTPATIENT)
Dept: PSYCHOLOGY | Facility: CLINIC | Age: 38
End: 2023-12-12
Payer: COMMERCIAL

## 2023-12-12 DIAGNOSIS — F41.1 GAD (GENERALIZED ANXIETY DISORDER): Primary | ICD-10-CM

## 2023-12-12 PROCEDURE — 90834 PSYTX W PT 45 MINUTES: CPT | Mod: VID

## 2023-12-19 ENCOUNTER — VIRTUAL VISIT (OUTPATIENT)
Dept: PSYCHOLOGY | Facility: CLINIC | Age: 38
End: 2023-12-19
Payer: COMMERCIAL

## 2023-12-19 DIAGNOSIS — F41.1 GAD (GENERALIZED ANXIETY DISORDER): Primary | ICD-10-CM

## 2023-12-19 PROCEDURE — 90834 PSYTX W PT 45 MINUTES: CPT | Mod: VID

## 2023-12-19 NOTE — PROGRESS NOTES
[facility]                                     Progress Note    Patient Name: Morro Coronel  Date: 12/19/23          Service Type: Individual      Session Start Time: 9:00am  Session End Time: 9:44am     Session Length: 44 minutes    Session #: 9    Attendees: Client    Service Modality:  Video Visit:      Provider verified identity through the following two step process.  Patient provided:  Patient is known previously to provider    Telemedicine Visit: The patient's condition can be safely assessed and treated via synchronous audio and visual telemedicine encounter.      Reason for Telemedicine Visit: Patient has requested telehealth visit    Originating Site (Patient Location): Patient's place of employment    Distant Site (Provider Location): Provider Remote Setting- Home Office    Consent:  The patient/guardian has verbally consented to: the potential risks and benefits of telemedicine (video visit) versus in person care; bill my insurance or make self-payment for services provided; and responsibility for payment of non-covered services.     Patient would like the video invitation sent by:  My Chart    Mode of Communication:  Video Conference via Amwell    Distant Location (Provider):  Off-site    As the provider I attest to compliance with applicable laws and regulations related to telemedicine.    DATA  Interactive Complexity: No  Crisis: No        Progress Since Last Session (Related to Symptoms / Goals / Homework):   Symptoms: No change pt reports having a pretty good week    Homework: Achieved / completed to satisfaction      Episode of Care Goals: Satisfactory progress - ACTION (Actively working towards change); Intervened by reinforcing change plan / affirming steps taken     Current / Ongoing Stressors and Concerns:   Pt reports some frustration with interactions with family. Pt reports frustration with invalidation of feelings historically.     Treatment Objective(s)  Addressed in This Session:   Patient will identify 2 fears / thoughts that contribute to feeling anxious.  Patient will understand how anxiety and alcohol use interact     Intervention:  Created space for pt to process stressors  Assisted pt in identifying and processing feelings and emotions  Helped pt identify and challenge cognitive distortions and connection to childhood      Assessments completed prior to visit:  The following assessments were completed by patient for this visit:  PHQ9:       8/2/2022     6:47 AM 9/13/2022     6:41 AM 3/3/2023    10:03 AM 5/2/2023     6:49 AM 5/5/2023    11:08 AM 10/23/2023     8:42 AM 11/28/2023     9:42 AM   PHQ-9 SCORE   PHQ-9 Total Score MyChart 0 0 6 (Mild depression) 6 (Mild depression)  5 (Mild depression) 1 (Minimal depression)   PHQ-9 Total Score 0 0 6 6 4 5 1     GAD7:       8/2/2022     6:47 AM 9/13/2022     6:42 AM 3/3/2023    10:04 AM 5/2/2023     6:49 AM 5/5/2023    11:08 AM 10/18/2023     8:31 AM 11/28/2023     9:42 AM   JEAN-PAUL-7 SCORE   Total Score 1 (minimal anxiety) 0 (minimal anxiety) 4 (minimal anxiety) 5 (mild anxiety)  8 (mild anxiety) 0 (minimal anxiety)   Total Score 1 0 4 5 3 8 0         ASSESSMENT: Current Emotional / Mental Status (status of significant symptoms):   Risk status (Self / Other harm or suicidal ideation)   Patient denies current fears or concerns for personal safety.   Patient denies current or recent suicidal ideation or behaviors.   Patient denies current or recent homicidal ideation or behaviors.   Patient denies current or recent self injurious behavior or ideation.   Patient denies other safety concerns.   Patient reports there has been no change in risk factors since their last session.     Patient reports there has been no change in protective factors since their last session.     Recommended that patient call 911 or go to the local ED should there be a change in any of these risk factors.     Appearance:   Appropriate    Eye  Contact:   Good    Psychomotor Behavior: Normal    Attitude:   Cooperative    Orientation:   All   Speech    Rate / Production: Normal     Volume:  Normal    Mood:    Anxious  Sad    Affect:    Appropriate  Worrisome    Thought Content:  Clear    Thought Form:  Coherent    Insight:    Good      Medication Review:   No changes to current psychiatric medication(s)     Medication Compliance:   Yes     Changes in Health Issues:   None reported     Chemical Use Review:   Substance Use: decrease in alcohol .  Patient reports frequency of use socially.  Stage of Change: Action          Tobacco Use: No current tobacco use.      Diagnosis:  1. JEAN-PAUL (generalized anxiety disorder)          Collateral Reports Completed:   Not Applicable    PLAN: (Patient Tasks / Therapist Tasks / Other)  Patient will journal to mindfully process during the week    Continue weekly therapy to process stressors and stabilize anxiety      Odalys Tavarez, [facility] December 19, 2023  Note reviewed and clinical supervision by BARBARA Alvarez, [facility] 12/19/2023                                               ______________________________________________________________________    Individual Treatment Plan    Patient's Name: Morro Coronel  YOB: 1985    Date of Creation: 11/2/2023  Date Treatment Plan Last Reviewed/Revised: 11/2/2023    DSM5 Diagnoses: 300.02 (F41.1) Generalized Anxiety Disorder  Psychosocial / Contextual Factors: , 2 children, lifelong anxiety, struggles with alcohol use, owns his own business, 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   PROMIS (reviewed every 90 days):   PROMIS-10 Scores        12/16/2021     8:02 AM 10/23/2023     8:57 AM   PROMIS-10 Total Score w/o Sub Scores   PROMIS TOTAL - SUBSCORES 28 29      Referral / Collaboration:  Referral to another professional/service is not indicated at this time..    Anticipated number of session for this episode of care: 9-12 sessions  Anticipation frequency of  session: Weekly  Anticipated Duration of each session: 38-52 minutes  Treatment plan will be reviewed in 90 days or when goals have been changed.       MeasurableTreatment Goal(s) related to diagnosis / functional impairment(s)  Goal 1: Patient will reduce anxiety symptoms    I will know I've met my goal when break down my history with alcohol use, understand why, and feel comfortable not using alcohol as an anxiety reducer. I would like to feel more comfortable talking with family about the use more. I'd also like to understand and reduce anxiety      Objective #A (Patient Action)    Patient will identify 2 fears / thoughts that contribute to feeling anxious.  Status: New - Date: 11/2/2023      Intervention(s)  Therapist will help patient explore and understand contributions to anxiety    Objective #B  Patient will understand how anxiety and alcohol use interact  Status: New - Date: 11/2/2023      Intervention(s)  Therapist will help patient explore and understand interaction between anxiety and alcohol use    Patient has reviewed and agreed to the above plan.      Odalys Tavarez, FELIPA  November 2, 2023   Treatment plan reviewed and clinical supervision by BARBARA Alvarez, [facility] 11/2/2023

## 2024-01-04 ENCOUNTER — VIRTUAL VISIT (OUTPATIENT)
Dept: PSYCHOLOGY | Facility: CLINIC | Age: 39
End: 2024-01-04
Payer: COMMERCIAL

## 2024-01-04 DIAGNOSIS — F41.1 GAD (GENERALIZED ANXIETY DISORDER): Primary | ICD-10-CM

## 2024-01-04 PROCEDURE — 90834 PSYTX W PT 45 MINUTES: CPT | Mod: 95

## 2024-01-04 NOTE — PROGRESS NOTES
[facility]                                     Progress Note    Patient Name: Morro Coronel  Date: 1/04/24          Service Type: Individual      Session Start Time: 9:03am  Session End Time: 9:45am     Session Length: 42 minutes    Session #: 10    Attendees: Client    Service Modality:  Video Visit:      Provider verified identity through the following two step process.  Patient provided:  Patient is known previously to provider    Telemedicine Visit: The patient's condition can be safely assessed and treated via synchronous audio and visual telemedicine encounter.      Reason for Telemedicine Visit: Patient has requested telehealth visit    Originating Site (Patient Location): Patient's place of employment    Distant Site (Provider Location): Provider Remote Setting- Home Office    Consent:  The patient/guardian has verbally consented to: the potential risks and benefits of telemedicine (video visit) versus in person care; bill my insurance or make self-payment for services provided; and responsibility for payment of non-covered services.     Patient would like the video invitation sent by:  My Chart    Mode of Communication:  Video Conference via Amwell    Distant Location (Provider):  Off-site    As the provider I attest to compliance with applicable laws and regulations related to telemedicine.    DATA  Interactive Complexity: No  Crisis: No        Progress Since Last Session (Related to Symptoms / Goals / Homework):   Symptoms: Worsening pt reports overwhelm and increase in alcohol use    Homework: Achieved / completed to satisfaction      Episode of Care Goals: Satisfactory progress - ACTION (Actively working towards change); Intervened by reinforcing change plan / affirming steps taken     Current / Ongoing Stressors and Concerns:   Pt reports being very busy with the holidays. Pt reports feeling overwhelmed with home and work and that leading to more alcohol use and feeling  more overwhelmed.      Treatment Objective(s) Addressed in This Session:   Patient will identify 2 fears / thoughts that contribute to feeling anxious.  Patient will understand how anxiety and alcohol use interact     Intervention:  Created space for pt to process stressors  Assisted pt in identifying and processing feelings and emotions  Helped pt identify and challenge cognitive distortions      Assessments completed prior to visit:  The following assessments were completed by patient for this visit:  PHQ9:       8/2/2022     6:47 AM 9/13/2022     6:41 AM 3/3/2023    10:03 AM 5/2/2023     6:49 AM 5/5/2023    11:08 AM 10/23/2023     8:42 AM 11/28/2023     9:42 AM   PHQ-9 SCORE   PHQ-9 Total Score MyChart 0 0 6 (Mild depression) 6 (Mild depression)  5 (Mild depression) 1 (Minimal depression)   PHQ-9 Total Score 0 0 6 6 4 5 1     GAD7:       8/2/2022     6:47 AM 9/13/2022     6:42 AM 3/3/2023    10:04 AM 5/2/2023     6:49 AM 5/5/2023    11:08 AM 10/18/2023     8:31 AM 11/28/2023     9:42 AM   JEAN-PAUL-7 SCORE   Total Score 1 (minimal anxiety) 0 (minimal anxiety) 4 (minimal anxiety) 5 (mild anxiety)  8 (mild anxiety) 0 (minimal anxiety)   Total Score 1 0 4 5 3 8 0         ASSESSMENT: Current Emotional / Mental Status (status of significant symptoms):   Risk status (Self / Other harm or suicidal ideation)   Patient denies current fears or concerns for personal safety.   Patient denies current or recent suicidal ideation or behaviors.   Patient denies current or recent homicidal ideation or behaviors.   Patient denies current or recent self injurious behavior or ideation.   Patient denies other safety concerns.   Patient reports there has been no change in risk factors since their last session.     Patient reports there has been no change in protective factors since their last session.     Recommended that patient call 911 or go to the local ED should there be a change in any of these risk  factors.     Appearance:   Appropriate    Eye Contact:   Good    Psychomotor Behavior: Normal    Attitude:   Cooperative    Orientation:   All   Speech    Rate / Production: Normal     Volume:  Normal    Mood:    Anxious  Sad    Affect:    Appropriate  Worrisome    Thought Content:  Clear    Thought Form:  Coherent    Insight:    Good      Medication Review:   No changes to current psychiatric medication(s)     Medication Compliance:   Yes     Changes in Health Issues:   None reported     Chemical Use Review:   Substance Use: increase in alcohol .  Patient reports frequency of use daily.  Stage of Change: Action        Tobacco Use: No current tobacco use.      Diagnosis:  1. JEAN-PAUL (generalized anxiety disorder)            Collateral Reports Completed:   Not Applicable    PLAN: (Patient Tasks / Therapist Tasks / Other)  Patient will notice areas where it would be helpful to ask for support or for thing he is needing    Continue weekly therapy to process stressors and stabilize anxiety      Odalys Tavarez, [facility] January 4, 2024  Note reviewed and clinical supervision by BARBARA Alvarez, [facility] 1/4/2024                                                 ______________________________________________________________________    Individual Treatment Plan    Patient's Name: Morro Coronel  YOB: 1985    Date of Creation: 11/2/2023  Date Treatment Plan Last Reviewed/Revised: 11/2/2023    DSM5 Diagnoses: 300.02 (F41.1) Generalized Anxiety Disorder  Psychosocial / Contextual Factors: , 2 children, lifelong anxiety, struggles with alcohol use, owns his own business, 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   PROMIS (reviewed every 90 days):   PROMIS-10 Scores        12/16/2021     8:02 AM 10/23/2023     8:57 AM   PROMIS-10 Total Score w/o Sub Scores   PROMIS TOTAL - SUBSCORES 28 29      Referral / Collaboration:  Referral to another professional/service is not indicated at this time..    Anticipated number of  session for this episode of care: 9-12 sessions  Anticipation frequency of session: Weekly  Anticipated Duration of each session: 38-52 minutes  Treatment plan will be reviewed in 90 days or when goals have been changed.       MeasurableTreatment Goal(s) related to diagnosis / functional impairment(s)  Goal 1: Patient will reduce anxiety symptoms    I will know I've met my goal when break down my history with alcohol use, understand why, and feel comfortable not using alcohol as an anxiety reducer. I would like to feel more comfortable talking with family about the use more. I'd also like to understand and reduce anxiety      Objective #A (Patient Action)    Patient will identify 2 fears / thoughts that contribute to feeling anxious.  Status: New - Date: 11/2/2023      Intervention(s)  Therapist will help patient explore and understand contributions to anxiety    Objective #B  Patient will understand how anxiety and alcohol use interact  Status: New - Date: 11/2/2023      Intervention(s)  Therapist will help patient explore and understand interaction between anxiety and alcohol use    Patient has reviewed and agreed to the above plan.      Odalys Tavarez, IRENE  November 2, 2023   Treatment plan reviewed and clinical supervision by BARBARA Alvarez, [facility] 11/2/2023

## 2024-01-09 ENCOUNTER — VIRTUAL VISIT (OUTPATIENT)
Dept: PSYCHOLOGY | Facility: CLINIC | Age: 39
End: 2024-01-09
Payer: COMMERCIAL

## 2024-01-09 DIAGNOSIS — F41.1 GAD (GENERALIZED ANXIETY DISORDER): Primary | ICD-10-CM

## 2024-01-09 PROCEDURE — 90834 PSYTX W PT 45 MINUTES: CPT | Mod: 95

## 2024-01-09 NOTE — PROGRESS NOTES
[facility]                                     Progress Note    Patient Name: Morro Coronel  Date: 1/09/24          Service Type: Individual      Session Start Time: 9:04am  Session End Time: 9:49am     Session Length: 45 minutes    Session #: 11    Attendees: Client    Service Modality:  Video Visit:      Provider verified identity through the following two step process.  Patient provided:  Patient is known previously to provider    Telemedicine Visit: The patient's condition can be safely assessed and treated via synchronous audio and visual telemedicine encounter.      Reason for Telemedicine Visit: Patient has requested telehealth visit    Originating Site (Patient Location): Patient's home    Distant Site (Provider Location): Provider Remote Setting- Home Office    Consent:  The patient/guardian has verbally consented to: the potential risks and benefits of telemedicine (video visit) versus in person care; bill my insurance or make self-payment for services provided; and responsibility for payment of non-covered services.     Patient would like the video invitation sent by:  My Chart    Mode of Communication:  Video Conference via Amwell    Distant Location (Provider):  Off-site    As the provider I attest to compliance with applicable laws and regulations related to telemedicine.    DATA  Interactive Complexity: No  Crisis: No        Progress Since Last Session (Related to Symptoms / Goals / Homework):   Symptoms: No change pt reports overwhelm    Homework: Achieved / completed to satisfaction      Episode of Care Goals: Satisfactory progress - ACTION (Actively working towards change); Intervened by reinforcing change plan / affirming steps taken     Current / Ongoing Stressors and Concerns:   Pt reports being busy with work. Pt reports some frustration with . Pt reports history of having difficulty asking for help and feeling alone.     Treatment Objective(s)  Addressed in This Session:   Patient will identify 2 fears / thoughts that contribute to feeling anxious.  Patient will understand how anxiety and alcohol use interact     Intervention:  Created space for pt to process stressors  Assisted pt in identifying and processing feelings and emotions  Helped pt identify and challenge cognitive distortions and connection to childhood      Assessments completed prior to visit:  The following assessments were completed by patient for this visit:  PHQ9:       8/2/2022     6:47 AM 9/13/2022     6:41 AM 3/3/2023    10:03 AM 5/2/2023     6:49 AM 5/5/2023    11:08 AM 10/23/2023     8:42 AM 11/28/2023     9:42 AM   PHQ-9 SCORE   PHQ-9 Total Score MyChart 0 0 6 (Mild depression) 6 (Mild depression)  5 (Mild depression) 1 (Minimal depression)   PHQ-9 Total Score 0 0 6 6 4 5 1     GAD7:       8/2/2022     6:47 AM 9/13/2022     6:42 AM 3/3/2023    10:04 AM 5/2/2023     6:49 AM 5/5/2023    11:08 AM 10/18/2023     8:31 AM 11/28/2023     9:42 AM   JEAN-PAUL-7 SCORE   Total Score 1 (minimal anxiety) 0 (minimal anxiety) 4 (minimal anxiety) 5 (mild anxiety)  8 (mild anxiety) 0 (minimal anxiety)   Total Score 1 0 4 5 3 8 0         ASSESSMENT: Current Emotional / Mental Status (status of significant symptoms):   Risk status (Self / Other harm or suicidal ideation)   Patient denies current fears or concerns for personal safety.   Patient denies current or recent suicidal ideation or behaviors.   Patient denies current or recent homicidal ideation or behaviors.   Patient denies current or recent self injurious behavior or ideation.   Patient denies other safety concerns.   Patient reports there has been no change in risk factors since their last session.     Patient reports there has been no change in protective factors since their last session.     Recommended that patient call 911 or go to the local ED should there be a change in any of these risk factors.     Appearance:   Appropriate    Eye  Contact:   Good    Psychomotor Behavior: Normal    Attitude:   Cooperative    Orientation:   All   Speech    Rate / Production: Normal     Volume:  Normal    Mood:    Anxious    Affect:    Appropriate  Worrisome    Thought Content:  Clear    Thought Form:  Coherent    Insight:    Good      Medication Review:   No changes to current psychiatric medication(s)     Medication Compliance:   Yes     Changes in Health Issues:   None reported     Chemical Use Review:   Substance Use: increase in alcohol .  Patient reports frequency of use daily.  Stage of Change: Action        Tobacco Use: No current tobacco use.      Diagnosis:  1. JEAN-PAUL (generalized anxiety disorder)              Collateral Reports Completed:   Not Applicable    PLAN: (Patient Tasks / Therapist Tasks / Other)  Patient will notice areas where it would be helpful to ask for support or for things he is needing    Continue weekly therapy to process stressors and stabilize anxiety      Odalys Tavarez, [facility] January 9, 2024  Note reviewed and clinical supervision by BARBARA Alvarez, [facility] 1/12/2024                                                 ______________________________________________________________________    Individual Treatment Plan    Patient's Name: Morro Coronel  YOB: 1985    Date of Creation: 11/2/2023  Date Treatment Plan Last Reviewed/Revised: 11/2/2023    DSM5 Diagnoses: 300.02 (F41.1) Generalized Anxiety Disorder  Psychosocial / Contextual Factors: , 2 children, lifelong anxiety, struggles with alcohol use, owns his own business, 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   PROMIS (reviewed every 90 days):   PROMIS-10 Scores        12/16/2021     8:02 AM 10/23/2023     8:57 AM   PROMIS-10 Total Score w/o Sub Scores   PROMIS TOTAL - SUBSCORES 28 29      Referral / Collaboration:  Referral to another professional/service is not indicated at this time..    Anticipated number of session for this episode of care: 9-12  sessions  Anticipation frequency of session: Weekly  Anticipated Duration of each session: 38-52 minutes  Treatment plan will be reviewed in 90 days or when goals have been changed.       MeasurableTreatment Goal(s) related to diagnosis / functional impairment(s)  Goal 1: Patient will reduce anxiety symptoms    I will know I've met my goal when break down my history with alcohol use, understand why, and feel comfortable not using alcohol as an anxiety reducer. I would like to feel more comfortable talking with family about the use more. I'd also like to understand and reduce anxiety      Objective #A (Patient Action)    Patient will identify 2 fears / thoughts that contribute to feeling anxious.  Status: New - Date: 11/2/2023      Intervention(s)  Therapist will help patient explore and understand contributions to anxiety    Objective #B  Patient will understand how anxiety and alcohol use interact  Status: New - Date: 11/2/2023      Intervention(s)  Therapist will help patient explore and understand interaction between anxiety and alcohol use    Patient has reviewed and agreed to the above plan.      Odalys Tavarez, IRENE  November 2, 2023   Treatment plan reviewed and clinical supervision [facility] 11/2/[facility]23

## 2024-01-16 ENCOUNTER — VIRTUAL VISIT (OUTPATIENT)
Dept: PSYCHOLOGY | Facility: CLINIC | Age: 39
End: 2024-01-16
Payer: COMMERCIAL

## 2024-01-16 DIAGNOSIS — F41.1 GAD (GENERALIZED ANXIETY DISORDER): Primary | ICD-10-CM

## 2024-01-16 PROCEDURE — 90834 PSYTX W PT 45 MINUTES: CPT | Mod: 95

## 2024-01-16 NOTE — PROGRESS NOTES
"[facility]                                     Progress Note    Patient Name: Morro Coronel  Date: 1/16/24          Service Type: Individual      Session Start Time: 9:00am  Session End Time: 9:47am     Session Length: 47 minutes    Session #: 12    Attendees: Client    Service Modality:  Video Visit:      Provider verified identity through the following two step process.  Patient provided:  Patient is known previously to provider    Telemedicine Visit: The patient's condition can be safely assessed and treated via synchronous audio and visual telemedicine encounter.      Reason for Telemedicine Visit: Patient has requested telehealth visit    Originating Site (Patient Location): Patient's home    Distant Site (Provider Location): Provider Remote Setting- Home Office    Consent:  The patient/guardian has verbally consented to: the potential risks and benefits of telemedicine (video visit) versus in person care; bill my insurance or make self-payment for services provided; and responsibility for payment of non-covered services.     Patient would like the video invitation sent by:  My Chart    Mode of Communication:  Video Conference via Amwell    Distant Location (Provider):  Off-site    As the provider I attest to compliance with applicable laws and regulations related to telemedicine.    DATA  Interactive Complexity: No  Crisis: No        Progress Since Last Session (Related to Symptoms / Goals / Homework):   Symptoms: Improving pt reports feeling a bit better this week    Homework: Achieved / completed to satisfaction      Episode of Care Goals: Satisfactory progress - ACTION (Actively working towards change); Intervened by reinforcing change plan / affirming steps taken     Current / Ongoing Stressors and Concerns:   Pt reports some concern around being the one to \"fix\" things in the family and make sure everyone is doing okay. Pt reports some frustration at self around this. Pt " reports feeling like he should be able to figure things out on his own and having had to do that as a child.     Treatment Objective(s) Addressed in This Session:   Patient will identify 2 fears / thoughts that contribute to feeling anxious.  Patient will understand how anxiety and alcohol use interact     Intervention:  Created space for pt to process stressors  Assisted pt in identifying and processing feelings and emotions  Helped pt identify and challenge cognitive distortions and connection to childhood      Assessments completed prior to visit:  The following assessments were completed by patient for this visit:  PHQ9:       8/2/2022     6:47 AM 9/13/2022     6:41 AM 3/3/2023    10:03 AM 5/2/2023     6:49 AM 5/5/2023    11:08 AM 10/23/2023     8:42 AM 11/28/2023     9:42 AM   PHQ-9 SCORE   PHQ-9 Total Score MyChart 0 0 6 (Mild depression) 6 (Mild depression)  5 (Mild depression) 1 (Minimal depression)   PHQ-9 Total Score 0 0 6 6 4 5 1     GAD7:       8/2/2022     6:47 AM 9/13/2022     6:42 AM 3/3/2023    10:04 AM 5/2/2023     6:49 AM 5/5/2023    11:08 AM 10/18/2023     8:31 AM 11/28/2023     9:42 AM   JEAN-PAUL-7 SCORE   Total Score 1 (minimal anxiety) 0 (minimal anxiety) 4 (minimal anxiety) 5 (mild anxiety)  8 (mild anxiety) 0 (minimal anxiety)   Total Score 1 0 4 5 3 8 0         ASSESSMENT: Current Emotional / Mental Status (status of significant symptoms):   Risk status (Self / Other harm or suicidal ideation)   Patient denies current fears or concerns for personal safety.   Patient denies current or recent suicidal ideation or behaviors.   Patient denies current or recent homicidal ideation or behaviors.   Patient denies current or recent self injurious behavior or ideation.   Patient denies other safety concerns.   Patient reports there has been no change in risk factors since their last session.     Patient reports there has been no change in protective factors since their last session.     Recommended that  patient call 911 or go to the local ED should there be a change in any of these risk factors.     Appearance:   Appropriate    Eye Contact:   Good    Psychomotor Behavior: Normal    Attitude:   Cooperative    Orientation:   All   Speech    Rate / Production: Normal     Volume:  Normal    Mood:    Anxious    Affect:    Appropriate  Worrisome    Thought Content:  Clear    Thought Form:  Coherent    Insight:    Good      Medication Review:   No changes to current psychiatric medication(s)     Medication Compliance:   Yes     Changes in Health Issues:   None reported     Chemical Use Review:   Substance Use: increase in alcohol .  Patient reports frequency of use daily.  Stage of Change: Action        Tobacco Use: No current tobacco use.      Diagnosis:  1. JEAN-PAUL (generalized anxiety disorder)            Collateral Reports Completed:   Not Applicable    PLAN: (Patient Tasks / Therapist Tasks / Other)  Patient will notice areas where it would be helpful to ask for support or for things he is needing    Continue weekly therapy to process stressors and stabilize anxiety      Odalys Tavarez, [facility] January 16, 2024  Note reviewed and clinical supervision by BARBARA Alvarez, [facility] 1[facility]/2024                                                 ______________________________________________________________________    Individual Treatment Plan    Patient's Name: Morro Coronel  YOB: 1985    Date of Creation: 11/2/2023  Date Treatment Plan Last Reviewed/Revised: 11/2/2023    DSM5 Diagnoses: 300.02 (F41.1) Generalized Anxiety Disorder  Psychosocial / Contextual Factors: , 2 children, lifelong anxiety, struggles with alcohol use, owns his own business, 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   PROMIS (reviewed every 90 days):   PROMIS-10 Scores        12/16/2021     8:02 AM 10/23/2023     8:57 AM   PROMIS-10 Total Score w/o Sub Scores   PROMIS TOTAL - SUBSCORES 28 29      Referral / Collaboration:  Referral to  another professional/service is not indicated at this time..    Anticipated number of session for this episode of care: 9-12 sessions  Anticipation frequency of session: Weekly  Anticipated Duration of each session: 38-52 minutes  Treatment plan will be reviewed in 90 days or when goals have been changed.       MeasurableTreatment Goal(s) related to diagnosis / functional impairment(s)  Goal 1: Patient will reduce anxiety symptoms    I will know I've met my goal when break down my history with alcohol use, understand why, and feel comfortable not using alcohol as an anxiety reducer. I would like to feel more comfortable talking with family about the use more. I'd also like to understand and reduce anxiety      Objective #A (Patient Action)    Patient will identify 2 fears / thoughts that contribute to feeling anxious.  Status: New - Date: 11/2/2023      Intervention(s)  Therapist will help patient explore and understand contributions to anxiety    Objective #B  Patient will understand how anxiety and alcohol use interact  Status: New - Date: 11/2/2023      Intervention(s)  Therapist will help patient explore and understand interaction between anxiety and alcohol use    Patient has reviewed and agreed to the above plan.      Odalys Tavarez, IRENE  November 2, 2023   Treatment plan reviewed and clinical supervision by BARBARA Alvarez, [facility] 11/2/2023

## 2024-01-23 ENCOUNTER — VIRTUAL VISIT (OUTPATIENT)
Dept: PSYCHOLOGY | Facility: CLINIC | Age: 39
End: 2024-01-23
Payer: COMMERCIAL

## 2024-01-23 DIAGNOSIS — F41.1 GAD (GENERALIZED ANXIETY DISORDER): Primary | ICD-10-CM

## 2024-01-23 PROCEDURE — 90834 PSYTX W PT 45 MINUTES: CPT | Mod: 95

## 2024-01-23 NOTE — PROGRESS NOTES
[facility]                                     Progress Note    Patient Name: Morro Coronel  Date: 1/23/24          Service Type: Individual      Session Start Time: 9:00am  Session End Time: 9:43am     Session Length: 43 minutes    Session #: 13    Attendees: Client    Service Modality:  Video Visit:      Provider verified identity through the following two step process.  Patient provided:  Patient is known previously to provider    Telemedicine Visit: The patient's condition can be safely assessed and treated via synchronous audio and visual telemedicine encounter.      Reason for Telemedicine Visit: Patient has requested telehealth visit    Originating Site (Patient Location): Patient's home    Distant Site (Provider Location): Provider Remote Setting- Home Office    Consent:  The patient/guardian has verbally consented to: the potential risks and benefits of telemedicine (video visit) versus in person care; bill my insurance or make self-payment for services provided; and responsibility for payment of non-covered services.     Patient would like the video invitation sent by:  My Chart    Mode of Communication:  Video Conference via Amwell    Distant Location (Provider):  Off-site    As the provider I attest to compliance with applicable laws and regulations related to telemedicine.    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 Pt reports feeling like he has to be the responsible one in the relationship.     Treatment Objective(s) Addressed in  This Session:   Patient will identify 2 fears / thoughts that contribute to feeling anxious.  Patient will understand how anxiety and alcohol use interact     Intervention:  Discussed provider's departure from [place] and options  Created space for pt to process stressors  Assisted pt in identifying and processing feelings and emotions  Helped pt identify and challenge cognitive distortions and connection to childhood        Assessments completed prior to visit:  The following assessments were completed by patient for this visit:  PHQ9:       8/2/2022     6:47 AM 9/13/2022     6:41 AM 3/3/2023    10:03 AM 5/2/2023     6:49 AM 5/5/2023    11:08 AM 10/23/2023     8:42 AM 11/28/2023     9:42 AM   PHQ-9 SCORE   PHQ-9 Total Score MyChart 0 0 6 (Mild depression) 6 (Mild depression)  5 (Mild depression) 1 (Minimal depression)   PHQ-9 Total Score 0 0 6 6 4 5 1     GAD7:       8/2/2022     6:47 AM 9/13/2022     6:42 AM 3/3/2023    10:04 AM 5/2/2023     6:49 AM 5/5/2023    11:08 AM 10/18/2023     8:31 AM 11/28/2023     9:42 AM   JEAN-PAUL-7 SCORE   Total Score 1 (minimal anxiety) 0 (minimal anxiety) 4 (minimal anxiety) 5 (mild anxiety)  8 (mild anxiety) 0 (minimal anxiety)   Total Score 1 0 4 5 3 8 0         ASSESSMENT: Current Emotional / Mental Status (status of significant symptoms):   Risk status (Self / Other harm or suicidal ideation)   Patient denies current fears or concerns for personal safety.   Patient denies current or recent suicidal ideation or behaviors.   Patient denies current or recent homicidal ideation or behaviors.   Patient denies current or recent self injurious behavior or ideation.   Patient denies other safety concerns.   Patient reports there has been no change in risk factors since their last session.     Patient reports there has been no change in protective factors since their last session.     Recommended that patient call 911 or go to the local ED should there be a change in any of these risk  factors.     Appearance:   Appropriate    Eye Contact:   Good    Psychomotor Behavior: Normal    Attitude:   Cooperative    Orientation:   All   Speech    Rate / Production: Normal     Volume:  Normal    Mood:    Anxious    Affect:    Appropriate  Worrisome    Thought Content:  Clear    Thought Form:  Coherent    Insight:    Good      Medication Review:   No changes to current psychiatric medication(s)     Medication Compliance:   Yes     Changes in Health Issues:   None reported     Chemical Use Review:   Substance Use: increase in alcohol .  Patient reports frequency of use daily.  Stage of Change: Action        Tobacco Use: No current tobacco use.      Diagnosis:  1. JEAN-PAUL (generalized anxiety disorder)          Collateral Reports Completed:   Not Applicable    PLAN: (Patient Tasks / Therapist Tasks / Other)  Patient will notice areas where it would be helpful to ask for support or for things he is needing    Continue weekly therapy to process stressors and stabilize anxiety      Odalys Tavarez, [facility] January 23, 2024  Note reviewed and clinical supervision by BARBARA Alvarez, [facility] 1/23/2024                                                 ______________________________________________________________________    Individual Treatment Plan    Patient's Name: Morro Coronel  YOB: 1985    Date of Creation: 11/2/2023  Date Treatment Plan Last Reviewed/Revised: 11/2/2023    DSM5 Diagnoses: 300.02 (F41.1) Generalized Anxiety Disorder  Psychosocial / Contextual Factors: , 2 children, lifelong anxiety, struggles with alcohol use, owns his own business, 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   PROMIS (reviewed every 90 days):   PROMIS-10 Scores        12/16/2021     8:02 AM 10/23/2023     8:57 AM 1/23/2024     8:59 AM   PROMIS-10 Total Score w/o Sub Scores   PROMIS TOTAL - SUBSCORES 28 29 29    29    29      Referral / Collaboration:  Referral to another professional/service is not indicated at  this time..    Anticipated number of session for this episode of care: 9-12 sessions  Anticipation frequency of session: Weekly  Anticipated Duration of each session: 38-52 minutes  Treatment plan will be reviewed in 90 days or when goals have been changed.       MeasurableTreatment Goal(s) related to diagnosis / functional impairment(s)  Goal 1: Patient will reduce anxiety symptoms    I will know I've met my goal when break down my history with alcohol use, understand why, and feel comfortable not using alcohol as an anxiety reducer. I would like to feel more comfortable talking with family about the use more. I'd also like to understand and reduce anxiety      Objective #A (Patient Action)    Patient will identify 2 fears / thoughts that contribute to feeling anxious.  Status: New - Date: 11/2/2023      Intervention(s)  Therapist will help patient explore and understand contributions to anxiety    Objective #B  Patient will understand how anxiety and alcohol use interact  Status: New - Date: 11/2/2023      Intervention(s)  Therapist will help patient explore and understand interaction between anxiety and alcohol use    Patient has reviewed and agreed to the above plan.      Odalys Tavarez, IRENE  November 2, 2023   Treatment plan reviewed and clinical supervision by BARBARA Alvarez, [facility] 11/2/2023

## 2024-02-02 ENCOUNTER — VIRTUAL VISIT (OUTPATIENT)
Dept: PSYCHOLOGY | Facility: CLINIC | Age: 39
End: 2024-02-02
Payer: COMMERCIAL

## 2024-02-02 ENCOUNTER — PATIENT OUTREACH (OUTPATIENT)
Dept: CARE COORDINATION | Facility: CLINIC | Age: 39
End: 2024-02-02
Payer: COMMERCIAL

## 2024-02-02 DIAGNOSIS — F41.1 GAD (GENERALIZED ANXIETY DISORDER): Primary | ICD-10-CM

## 2024-02-02 PROCEDURE — 90834 PSYTX W PT 45 MINUTES: CPT | Mod: 95

## 2024-02-02 NOTE — PROGRESS NOTES
[facility]                                     Progress Note    Patient Name: Morro Coronel  Date: 2/02/24          Service Type: Individual      Session Start Time: 9:00am  Session End Time: 9:47am     Session Length: 47 minutes    Session #: 14    Attendees: Client    Service Modality:  Video Visit:      Provider verified identity through the following two step process.  Patient provided:  Patient is known previously to provider    Telemedicine Visit: The patient's condition can be safely assessed and treated via synchronous audio and visual telemedicine encounter.      Reason for Telemedicine Visit: Patient has requested telehealth visit    Originating Site (Patient Location): Patient's home    Distant Site (Provider Location): Provider Remote Setting- Home Office    Consent:  The patient/guardian has verbally consented to: the potential risks and benefits of telemedicine (video visit) versus in person care; bill my insurance or make self-payment for services provided; and responsibility for payment of non-covered services.     Patient would like the video invitation sent by:  My Chart    Mode of Communication:  Video Conference via Amwell    Distant Location (Provider):  Off-site    As the provider I attest to compliance with applicable laws and regulations related to telemedicine.    DATA  Interactive Complexity: No  Crisis: No        Progress Since Last Session (Related to Symptoms / Goals / Homework):   Symptoms: Worsening pt reports feeling very tired this week    Homework: Achieved / completed to satisfaction      Episode of Care Goals: Satisfactory progress - ACTION (Actively working towards change); Intervened by reinforcing change plan / affirming steps taken     Current / Ongoing Stressors and Concerns:   Pt reports feeling very busy and being on autopilot. Pt reports some feelings of overwhelm. Pt reports noticing feelings but wanting to push them away.     Treatment  Objective(s) Addressed in This Session:   Patient will identify 2 fears / thoughts that contribute to feeling anxious.  Patient will understand how anxiety and alcohol use interact     Intervention:  Created space to process stressors  Assisted pt in identifying and processing feelings and emotions  Helped pt identify and challenge cognitive distortions and connection to childhood        Assessments completed prior to visit:  The following assessments were completed by patient for this visit:  PHQ9:       8/2/2022     6:47 AM 9/13/2022     6:41 AM 3/3/2023    10:03 AM 5/2/2023     6:49 AM 5/5/2023    11:08 AM 10/23/2023     8:42 AM 11/28/2023     9:42 AM   PHQ-9 SCORE   PHQ-9 Total Score MyChart 0 0 6 (Mild depression) 6 (Mild depression)  5 (Mild depression) 1 (Minimal depression)   PHQ-9 Total Score 0 0 6 6 4 5 1     GAD7:       8/2/2022     6:47 AM 9/13/2022     6:42 AM 3/3/2023    10:04 AM 5/2/2023     6:49 AM 5/5/2023    11:08 AM 10/18/2023     8:31 AM 11/28/2023     9:42 AM   JEAN-PAUL-7 SCORE   Total Score 1 (minimal anxiety) 0 (minimal anxiety) 4 (minimal anxiety) 5 (mild anxiety)  8 (mild anxiety) 0 (minimal anxiety)   Total Score 1 0 4 5 3 8 0         ASSESSMENT: Current Emotional / Mental Status (status of significant symptoms):   Risk status (Self / Other harm or suicidal ideation)   Patient denies current fears or concerns for personal safety.   Patient denies current or recent suicidal ideation or behaviors.   Patient denies current or recent homicidal ideation or behaviors.   Patient denies current or recent self injurious behavior or ideation.   Patient denies other safety concerns.   Patient reports there has been no change in risk factors since their last session.     Patient reports there has been no change in protective factors since their last session.     Recommended that patient call 911 or go to the local ED should there be a change in any of these risk factors.     Appearance:   Appropriate    Eye  Contact:   Good    Psychomotor Behavior: Normal    Attitude:   Cooperative    Orientation:   All   Speech    Rate / Production: Normal     Volume:  Normal    Mood:    Anxious    Affect:    Appropriate  Worrisome    Thought Content:  Clear    Thought Form:  Coherent    Insight:    Good      Medication Review:   No changes to current psychiatric medication(s)     Medication Compliance:   Yes     Changes in Health Issues:   None reported     Chemical Use Review:   Substance Use: increase in alcohol .  Patient reports frequency of use daily.  Stage of Change: Action        Tobacco Use: No current tobacco use.      Diagnosis:  1. JEAN-PAUL (generalized anxiety disorder)            Collateral Reports Completed:   Not Applicable    PLAN: (Patient Tasks / Therapist Tasks / Other)  Patient will notice areas where it would be helpful to ask for support or for things he is needing    Continue weekly therapy to process stressors and stabilize anxiety      Odalys Tavarez, [facility] February 2, 2024  Note reviewed and clinical supervision by BARBARA Alvarez, 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   PROMIS (reviewed every 90 days):   PROMIS-10 Scores        12/16/2021     8:02 AM 10/23/2023     8:57 AM 1/23/2024     8:59 AM   PROMIS-10 Total Score w/o Sub Scores   PROMIS TOTAL - SUBSCORES 28 29 29    29    29      Referral / Collaboration:  Referral to another professional/service is not indicated at this time..    Anticipated number of session for  this episode of care: 9-12 sessions  Anticipation frequency of session: Weekly  Anticipated Duration of each session: 38-52 minutes  Treatment plan will be reviewed in 90 days or when goals have been changed.       MeasurableTreatment Goal(s) related to diagnosis / functional impairment(s)  Goal 1: Patient will reduce anxiety symptoms    I will know I've met my goal when break down my history with alcohol use, understand why, and feel comfortable not using alcohol as an anxiety reducer. I would like to feel more comfortable talking with family about the use more. I'd also like to understand and reduce anxiety      Objective #A (Patient Action)    Patient will identify 2 fears / thoughts that contribute to feeling anxious.  Status: New - Date: 11/2/2023      Intervention(s)  Therapist will help patient explore and understand contributions to anxiety    Objective #B  Patient will understand how anxiety and alcohol use interact  Status: New - Date: 11/2/2023      Intervention(s)  Therapist will help patient explore and understand interaction between anxiety and alcohol use    Patient has reviewed and agreed to the above plan.      Odalys Tavarez, IRENE  November 2, 2023   Treatment plan reviewed and clinical supervision by BARBARA Alvarez, [facility] 11/2/2023

## 2024-02-06 ENCOUNTER — VIRTUAL VISIT (OUTPATIENT)
Dept: PSYCHOLOGY | Facility: CLINIC | Age: 39
End: 2024-02-06
Payer: COMMERCIAL

## 2024-02-06 DIAGNOSIS — F41.1 GAD (GENERALIZED ANXIETY DISORDER): Primary | ICD-10-CM

## 2024-02-06 PROCEDURE — 90834 PSYTX W PT 45 MINUTES: CPT | Mod: 95

## 2024-02-06 NOTE — PROGRESS NOTES
[facility]                                     Progress Note    Patient Name: Morro Coronel  Date: 2/06/24          Service Type: Individual      Session Start Time: 9:00am  Session End Time: 9:48am     Session Length: 48 minutes    Session #: 15    Attendees: Client    Service Modality:  Video Visit:      Provider verified identity through the following two step process.  Patient provided:  Patient is known previously to provider    Telemedicine Visit: The patient's condition can be safely assessed and treated via synchronous audio and visual telemedicine encounter.      Reason for Telemedicine Visit: Patient has requested telehealth visit    Originating Site (Patient Location): Patient's home    Distant Site (Provider Location): Provider Remote Setting- Home Office    Consent:  The patient/guardian has verbally consented to: the potential risks and benefits of telemedicine (video visit) versus in person care; bill my insurance or make self-payment for services provided; and responsibility for payment of non-covered services.     Patient would like the video invitation sent by:  My Chart    Mode of Communication:  Video Conference via Amwell    Distant Location (Provider):  Off-site    As the provider I attest to compliance with applicable laws and regulations related to telemedicine.    DATA  Interactive Complexity: No  Crisis: No        Progress Since Last Session (Related to Symptoms / Goals / Homework):   Symptoms: No change pt reports having a busy week    Homework: Achieved / completed to satisfaction      Episode of Care Goals: Satisfactory progress - ACTION (Actively working towards change); Intervened by reinforcing change plan / affirming steps taken     Current / Ongoing Stressors and Concerns:   Pt reports having a stressful weekend. Pt reports difficulty allowing his own feelings. Pt reports history of self consciousness.     Treatment Objective(s) Addressed in This  Session:   Patient will identify 2 fears / thoughts that contribute to feeling anxious.  Patient will understand how anxiety and alcohol use interact     Intervention:  Created space for pt to process stressors  Assisted pt in identifying and processing feelings and emotions  Used somatics to help pt to connect to feelings  Helped pt identify and challenge cognitive distortions and connection to childhood        Assessments completed prior to visit:  The following assessments were completed by patient for this visit:  PHQ9:       8/2/2022     6:47 AM 9/13/2022     6:41 AM 3/3/2023    10:03 AM 5/2/2023     6:49 AM 5/5/2023    11:08 AM 10/23/2023     8:42 AM 11/28/2023     9:42 AM   PHQ-9 SCORE   PHQ-9 Total Score MyChart 0 0 6 (Mild depression) 6 (Mild depression)  5 (Mild depression) 1 (Minimal depression)   PHQ-9 Total Score 0 0 6 6 4 5 1     GAD7:       8/2/2022     6:47 AM 9/13/2022     6:42 AM 3/3/2023    10:04 AM 5/2/2023     6:49 AM 5/5/2023    11:08 AM 10/18/2023     8:31 AM 11/28/2023     9:42 AM   JEAN-PAUL-7 SCORE   Total Score 1 (minimal anxiety) 0 (minimal anxiety) 4 (minimal anxiety) 5 (mild anxiety)  8 (mild anxiety) 0 (minimal anxiety)   Total Score 1 0 4 5 3 8 0         ASSESSMENT: Current Emotional / Mental Status (status of significant symptoms):   Risk status (Self / Other harm or suicidal ideation)   Patient denies current fears or concerns for personal safety.   Patient denies current or recent suicidal ideation or behaviors.   Patient denies current or recent homicidal ideation or behaviors.   Patient denies current or recent self injurious behavior or ideation.   Patient denies other safety concerns.   Patient reports there has been no change in risk factors since their last session.     Patient reports there has been no change in protective factors since their last session.     Recommended that patient call 911 or go to the local ED should there be a change in any of these risk  factors.     Appearance:   Appropriate    Eye Contact:   Good    Psychomotor Behavior: Normal    Attitude:   Cooperative    Orientation:   All   Speech    Rate / Production: Normal     Volume:  Normal    Mood:    Anxious    Affect:    Appropriate  Worrisome    Thought Content:  Clear    Thought Form:  Coherent    Insight:    Fair      Medication Review:   No changes to current psychiatric medication(s)     Medication Compliance:   Yes     Changes in Health Issues:   None reported     Chemical Use Review:   Substance Use: increase in alcohol .  Patient reports frequency of use daily.  Stage of Change: Action        Tobacco Use: No current tobacco use.      Diagnosis:  1. JEAN-PAUL (generalized anxiety disorder)      Collateral Reports Completed:   Not Applicable    PLAN: (Patient Tasks / Therapist Tasks / Other)  Patient will notice areas where it would be helpful to ask for support or for things he is needing    Continue weekly therapy to process stressors and stabilize anxiety      Odalys Tavarez, [facility] February 6, 2024  Note reviewed and clinical supervision by BARBARA Alvarez, [facility] 2/7/2024                                                 ______________________________________________________________________    Individual Treatment Plan    Patient's Name: Morro Coronel  YOB: 1985    Date of Creation: 11/2/2023  Date Treatment Plan Last Reviewed/Revised: 2/6/2024    DSM5 Diagnoses: 300.02 (F41.1) Generalized Anxiety Disorder  Psychosocial / Contextual Factors: , 2 children, lifelong anxiety, struggles with alcohol use, owns his own business, 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   PROMIS (reviewed every 90 days):   PROMIS-10 Scores        12/16/2021     8:02 AM 10/23/2023     8:57 AM 1/23/2024     8:59 AM   PROMIS-10 Total Score w/o Sub Scores   PROMIS TOTAL - SUBSCORES 28 29 29    29    29      Referral / Collaboration:  Referral to another professional/service is not indicated at this  time..    Anticipated number of session for this episode of care: 9-12 sessions  Anticipation frequency of session: Weekly  Anticipated Duration of each session: 38-52 minutes  Treatment plan will be reviewed in 90 days or when goals have been changed.       MeasurableTreatment Goal(s) related to diagnosis / functional impairment(s)  Goal 1: Patient will reduce anxiety symptoms    I will know I've met my goal when break down my history with alcohol use, understand why, and feel comfortable not using alcohol as an anxiety reducer. I would like to feel more comfortable talking with family about the use more. I'd also like to understand and reduce anxiety      Objective #A (Patient Action)    Patient will identify 2 fears / thoughts that contribute to feeling anxious.  Status: Continued - Date(s): 2/6/2024    Intervention(s)  Therapist will help patient explore and understand contributions to anxiety    Objective #B  Patient will understand how anxiety and alcohol use interact  Status: Continued - Date(s): 2/6/2024    Intervention(s)  Therapist will help patient explore and understand interaction between anxiety and alcohol use    Patient has reviewed and agreed to the above plan.      Odalys Tavarez, FELIPA  February 6, 2024  Treatment plan reviewed and clinical supervision by BARBARA Alvarez, [facility] 2/7/2024

## 2024-02-12 ENCOUNTER — VIRTUAL VISIT (OUTPATIENT)
Dept: PSYCHOLOGY | Facility: CLINIC | Age: 39
End: 2024-02-12
Payer: COMMERCIAL

## 2024-02-12 DIAGNOSIS — F41.1 GAD (GENERALIZED ANXIETY DISORDER): Primary | ICD-10-CM

## 2024-02-12 PROCEDURE — 90834 PSYTX W PT 45 MINUTES: CPT | Mod: 95

## 2024-02-12 NOTE — PROGRESS NOTES
[facility]                                     Progress Note    Patient Name: Morro Coronel  Date: 2/12/24          Service Type: Individual      Session Start Time: 9:01am  Session End Time: 9:45am     Session Length: 44 minutes    Session #: 16    Attendees: Client    Service Modality:  Video Visit:      Provider verified identity through the following two step process.  Patient provided:  Patient is known previously to provider    Telemedicine Visit: The patient's condition can be safely assessed and treated via synchronous audio and visual telemedicine encounter.      Reason for Telemedicine Visit: Patient has requested telehealth visit    Originating Site (Patient Location): Patient's home    Distant Site (Provider Location): Provider Remote Setting- Home Office    Consent:  The patient/guardian has verbally consented to: the potential risks and benefits of telemedicine (video visit) versus in person care; bill my insurance or make self-payment for services provided; and responsibility for payment of non-covered services.     Patient would like the video invitation sent by:  My Chart    Mode of Communication:  Video Conference via Amwell    Distant Location (Provider):  Off-site    As the provider I attest to compliance with applicable laws and regulations related to telemedicine.    DATA  Interactive Complexity: No  Crisis: No        Progress Since Last Session (Related to Symptoms / Goals / Homework):   Symptoms: Improving pt reports improvement in noticing feelings    Homework: Achieved / completed to satisfaction      Episode of Care Goals: Satisfactory progress - ACTION (Actively working towards change); Intervened by reinforcing change plan / affirming steps taken     Current / Ongoing Stressors and Concerns:   Pt reports having a stressful week and getting an illness. Pt reports a difficult interaction with his child, but noticing how he was feeling and resetting. Pt  reports history of expected independence when he was a child.     Treatment Objective(s) Addressed in This Session:   Patient will identify 2 fears / thoughts that contribute to feeling anxious.  Patient will understand how anxiety and alcohol use interact     Intervention:  Created space for pt to process stressors  Assisted pt in identifying and processing feelings and emotions  Helped pt identify and challenge cognitive distortions and connection to childhood        Assessments completed prior to visit:  The following assessments were completed by patient for this visit:  PHQ9:       8/2/2022     6:47 AM 9/13/2022     6:41 AM 3/3/2023    10:03 AM 5/2/2023     6:49 AM 5/5/2023    11:08 AM 10/23/2023     8:42 AM 11/28/2023     9:42 AM   PHQ-9 SCORE   PHQ-9 Total Score MyChart 0 0 6 (Mild depression) 6 (Mild depression)  5 (Mild depression) 1 (Minimal depression)   PHQ-9 Total Score 0 0 6 6 4 5 1     GAD7:       8/2/2022     6:47 AM 9/13/2022     6:42 AM 3/3/2023    10:04 AM 5/2/2023     6:49 AM 5/5/2023    11:08 AM 10/18/2023     8:31 AM 11/28/2023     9:42 AM   JEAN-PAUL-7 SCORE   Total Score 1 (minimal anxiety) 0 (minimal anxiety) 4 (minimal anxiety) 5 (mild anxiety)  8 (mild anxiety) 0 (minimal anxiety)   Total Score 1 0 4 5 3 8 0         ASSESSMENT: Current Emotional / Mental Status (status of significant symptoms):   Risk status (Self / Other harm or suicidal ideation)   Patient denies current fears or concerns for personal safety.   Patient denies current or recent suicidal ideation or behaviors.   Patient denies current or recent homicidal ideation or behaviors.   Patient denies current or recent self injurious behavior or ideation.   Patient denies other safety concerns.   Patient reports there has been no change in risk factors since their last session.     Patient reports there has been no change in protective factors since their last session.     Recommended that patient call 911 or go to the local ED should  there be a change in any of these risk factors.     Appearance:   Appropriate    Eye Contact:   Good    Psychomotor Behavior: Normal    Attitude:   Cooperative    Orientation:   All   Speech    Rate / Production: Normal     Volume:  Normal    Mood:    Anxious    Affect:    Appropriate  Worrisome    Thought Content:  Clear    Thought Form:  Coherent    Insight:    Fair      Medication Review:   No changes to current psychiatric medication(s)     Medication Compliance:   Yes     Changes in Health Issues:   None reported     Chemical Use Review:   Substance Use: increase in alcohol .  Patient reports frequency of use daily.  Stage of Change: Action        Tobacco Use: No current tobacco use.      Diagnosis:  1. JEAN-PAUL (generalized anxiety disorder)        Collateral Reports Completed:   Not Applicable    PLAN: (Patient Tasks / Therapist Tasks / Other)  Patient will notice areas where it would be helpful to ask for support or for things he is needing    Continue weekly therapy to process stressors and stabilize anxiety      Odalys Tavarez, [facility] February 12, 2024  Note reviewed and clinical supervision by BARBARA Alvarez, [facility] 2/12/2024                                                 ______________________________________________________________________    Individual Treatment Plan    Patient's Name: Morro Coronel  YOB: 1985    Date of Creation: 11/2/2023  Date Treatment Plan Last Reviewed/Revised: 2/6/2024    DSM5 Diagnoses: 300.02 (F41.1) Generalized Anxiety Disorder  Psychosocial / Contextual Factors: , 2 children, lifelong anxiety, struggles with alcohol use, owns his own business, 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   PROMIS (reviewed every 90 days):   PROMIS-10 Scores        12/16/2021     8:02 AM 10/23/2023     8:57 AM 1/23/2024     8:59 AM   PROMIS-10 Total Score w/o Sub Scores   PROMIS TOTAL - SUBSCORES 28 29 29    29    29      Referral / Collaboration:  Referral to another  professional/service is not indicated at this time..    Anticipated number of session for this episode of care: 9-12 sessions  Anticipation frequency of session: Weekly  Anticipated Duration of each session: 38-52 minutes  Treatment plan will be reviewed in 90 days or when goals have been changed.       MeasurableTreatment Goal(s) related to diagnosis / functional impairment(s)  Goal 1: Patient will reduce anxiety symptoms    I will know I've met my goal when break down my history with alcohol use, understand why, and feel comfortable not using alcohol as an anxiety reducer. I would like to feel more comfortable talking with family about the use more. I'd also like to understand and reduce anxiety      Objective #A (Patient Action)    Patient will identify 2 fears / thoughts that contribute to feeling anxious.  Status: Continued - Date(s): 2/6/2024    Intervention(s)  Therapist will help patient explore and understand contributions to anxiety    Objective #B  Patient will understand how anxiety and alcohol use interact  Status: Continued - Date(s): 2/6/2024    Intervention(s)  Therapist will help patient explore and understand interaction between anxiety and alcohol use    Patient has reviewed and agreed to the above plan.      Odalys Tavarez, FELIPA  February 6, 2024  Treatment plan reviewed and clinical supervision by BARBARA Alvarez, [facility] 2/7/2024

## 2024-02-15 ENCOUNTER — FCC EXTENDED DOCUMENTATION (OUTPATIENT)
Dept: PSYCHOLOGY | Facility: CLINIC | Age: 39
End: 2024-02-15
Payer: COMMERCIAL

## 2024-02-15 NOTE — PROGRESS NOTES
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           Presenting Concern:  Anxiety and alcohol use      Reason for Discharge:  Patient is transferring with provider to new practice      Disposition at Time of Last Encounter:   Comments:        Risk Management:   Client denies a history of suicidal ideation, suicide attempts, self-injurious behavior, homicidal ideation, homicidal behavior, and and other safety concerns  Recommended that patient call 911 or go to the local ED should there be a change in any of these risk factors.      Referred To:  Patient is welcome to restart therapy at [facility] at any time by calling [phone]        IRENE Singleton   2/15/2024

## 2024-02-16 ENCOUNTER — PATIENT OUTREACH (OUTPATIENT)
Dept: CARE COORDINATION | Facility: CLINIC | Age: 39
End: 2024-02-16

## 2024-02-16 ENCOUNTER — OFFICE VISIT (OUTPATIENT)
Dept: FAMILY MEDICINE | Facility: CLINIC | Age: 39
End: 2024-02-16
Payer: COMMERCIAL

## 2024-02-16 VITALS
OXYGEN SATURATION: 97 % | SYSTOLIC BLOOD PRESSURE: 133 MMHG | TEMPERATURE: 97.7 F | BODY MASS INDEX: 24.99 KG/M2 | WEIGHT: 201 LBS | RESPIRATION RATE: 16 BRPM | HEART RATE: 81 BPM | DIASTOLIC BLOOD PRESSURE: 83 MMHG | HEIGHT: 75 IN

## 2024-02-16 DIAGNOSIS — H10.33 ACUTE CONJUNCTIVITIS OF BOTH EYES, UNSPECIFIED ACUTE CONJUNCTIVITIS TYPE: ICD-10-CM

## 2024-02-16 DIAGNOSIS — H66.002 NON-RECURRENT ACUTE SUPPURATIVE OTITIS MEDIA OF LEFT EAR WITHOUT SPONTANEOUS RUPTURE OF TYMPANIC MEMBRANE: Primary | ICD-10-CM

## 2024-02-16 LAB
DEPRECATED S PYO AG THROAT QL EIA: NEGATIVE
FLUAV RNA SPEC QL NAA+PROBE: NEGATIVE
FLUBV RNA RESP QL NAA+PROBE: NEGATIVE
GROUP A STREP BY PCR: NOT DETECTED
RSV RNA SPEC NAA+PROBE: NEGATIVE
SARS-COV-2 RNA RESP QL NAA+PROBE: NEGATIVE

## 2024-02-16 PROCEDURE — 87651 STREP A DNA AMP PROBE: CPT | Performed by: FAMILY MEDICINE

## 2024-02-16 PROCEDURE — 99213 OFFICE O/P EST LOW 20 MIN: CPT | Performed by: FAMILY MEDICINE

## 2024-02-16 PROCEDURE — 87637 SARSCOV2&INF A&B&RSV AMP PRB: CPT | Performed by: FAMILY MEDICINE

## 2024-02-16 RX ORDER — AMOXICILLIN 875 MG
875 TABLET ORAL 2 TIMES DAILY
Qty: 20 TABLET | Refills: 0 | Status: SHIPPED | OUTPATIENT
Start: 2024-02-16 | End: 2024-02-26

## 2024-02-16 RX ORDER — POLYMYXIN B SULFATE AND TRIMETHOPRIM 1; 10000 MG/ML; [USP'U]/ML
1-2 SOLUTION OPHTHALMIC EVERY 4 HOURS
Qty: 10 ML | Refills: 0 | Status: SHIPPED | OUTPATIENT
Start: 2024-02-16 | End: 2024-03-07

## 2024-02-16 ASSESSMENT — ENCOUNTER SYMPTOMS
COUGH: 1
HEADACHES: 1
SORE THROAT: 1

## 2024-02-16 NOTE — PROGRESS NOTES
Assessment & Plan     (H66.002) Non-recurrent acute suppurative otitis media of left ear without spontaneous rupture of tympanic membrane  (primary encounter diagnosis)  Plan: Symptomatic Influenza A/B, RSV, & SARS-CoV2 PCR        (COVID-19) Nose, amoxicillin (AMOXIL) 875 MG         tablet  (H10.33) Acute conjunctivitis of both eyes, unspecified acute conjunctivitis type  Plan: Streptococcus A Rapid Screen w/Reflex to PCR -         Clinic Collect, Group A Streptococcus PCR         Throat Swab, polymixin b-trimethoprim         (POLYTRIM) 60957-9.1 UNIT/ML-% ophthalmic         solution  EHR reviewed.   Past medical history, problem list, past surgical history, family history, social history, medications reviewed, updated, reconciled.   Afebrile. No respiratory distress. Lung exam normal, defer imaging.   Eyes certainly seem injected, drainage and crusting present. Treated with polytrim.   TM exam reviewed with patient, certainly with otitis media in the left. Treated with amoxicillin. Reviewed time needed to take affect. Complete entire course. Discussed supportive care. If there is any change or worsening of symptoms, to call or be seen. We did rule out strep, RSV, influenza, covid 19 due to community burden.           Yony Hooker is a 38 year old, presenting for the following health issues:  Ear Problem (Left ear plug), Headache, Pharyngitis, and Cough ( It started Saturday and got worse over time )      2/16/2024    11:37 AM   Additional Questions   Roomed by Fariba     Ear Problem  Associated symptoms include headaches, sore throat and cough.   Headache     Pharyngitis   Associated symptoms include ear pain, headaches and cough.   Cough  Associated symptoms include ear pain, headaches and sore throat.   History of Present Illness       Reason for visit:  Worsening cold symptoms.  Symptom onset:  3-7 days ago  Symptoms include:  Severe nasal and chest congestion. Conjunctivitis in both eyes and my left  "ear is plugged. Cough, runny nose, chills, headache.  Symptom intensity:  Severe  Symptom progression:  Worsening  Had these symptoms before:  No  What makes it worse:  No  What makes it better:  Ibuprofen, Flonase, sleep.    He eats 0-1 servings of fruits and vegetables daily.He consumes 2 sweetened beverage(s) daily.He exercises with enough effort to increase his heart rate 9 or less minutes per day.  He exercises with enough effort to increase his heart rate 3 or less days per week.   He is taking medications regularly.         Objective    /83 (BP Location: Left arm, Patient Position: Sitting, Cuff Size: Adult Regular)   Pulse 81   Temp 97.7  F (36.5  C) (Temporal)   Resp 16   Ht 1.91 m (6' 3.2\")   Wt 91.2 kg (201 lb)   SpO2 97%   BMI 24.99 kg/m    Body mass index is 24.99 kg/m .  Physical Exam   GENERAL: alert and no distress  EYES: Eyes grossly normal to inspection, PERRL and conjunctivae and sclerae normal  EYES: PERRL, EOMI, and conjunctiva/corneas- conjunctival injection bilaterally  HENT: normal cephalic/atraumatic, right ear: erythematous, left ear: erythematous and bulging membrane, nasal mucosa edematous , oropharynx clear, and oral mucous membranes moist  NECK: no adenopathy, no asymmetry, masses, or scars  RESP: lungs clear to auscultation - no rales, rhonchi or wheezes  CV: regular rate and rhythm, normal S1 S2, no S3 or S4, no murmur, click or rub, no peripheral edema  MS: no gross musculoskeletal defects noted, no edema  SKIN: no suspicious lesions or rashes  NEURO: Normal strength and tone, mentation intact and speech normal  PSYCH: mentation appears normal, affect normal/bright  LYMPH: no cervical, supraclavicular, axillary, or inguinal adenopathy    Results for orders placed or performed in visit on 02/16/24   Symptomatic Influenza A/B, RSV, & SARS-CoV2 PCR (COVID-19) Nose     Status: Normal    Specimen: Nose; Swab   Result Value Ref Range    Influenza A PCR Negative Negative    " Influenza B PCR Negative Negative    RSV PCR Negative Negative    SARS CoV2 PCR Negative Negative    Narrative    Testing was performed using the Xpert Xpress CoV2/Flu/RSV Assay on the [id number] Instrument. This test should be ordered for the detection of SARS-CoV-2, influenza, and RSV viruses in individuals who meet clinical and/or epidemiological criteria. Test performance is unknown in asymptomatic patients. This test is for in vitro diagnostic use under the FDA EUA for laboratories certified under CLIA to perform high or moderate complexity testing. This test has not been FDA cleared or approved. A negative result does not rule out the presence of PCR inhibitors in the specimen or target RNA in concentration below the limit of detection for the assay. If only one viral target is positive but coinfection with multiple targets is suspected, the sample should be re-tested with another FDA cleared, approved, or authorized test, if coinfection would change clinical management. This test was validated by the [facility]. These laboratories are certified under the Clinical Laboratory Improvement Amendments of 1988 (CLIA-88) as qualified to perform high complexity laboratory testing.   Streptococcus A Rapid Screen w/Reflex to PCR - Clinic Collect     Status: Normal    Specimen: Throat; Swab   Result Value Ref Range    Group A Strep antigen Negative Negative   Group A Streptococcus PCR Throat Swab     Status: Normal    Specimen: Throat; Swab   Result Value Ref Range    Group A strep by PCR Not Detected Not Detected    Narrative    The Xpert Xpress Strep A test, performed on the [employer], is a rapid, qualitative in vitro diagnostic test for the detection of Streptococcus pyogenes (Group A ß-hemolytic Streptococcus, Strep A) in throat swab specimens from patients with signs and symptoms of pharyngitis. The Xpert Xpress Strep A test can be used as an aid in the diagnosis of  Group A Streptococcal pharyngitis. The assay is not intended to monitor treatment for Group A Streptococcus infections. The Xpert Xpress Strep A test utilizes an automated real-time polymerase chain reaction (PCR) to detect Streptococcus pyogenes DNA.         Prior to immunization administration, verified patients identity using patient s name and date of birth. Please see Immunization Activity for additional information.     Screening Questionnaire for Adult Immunization    Are you sick today?   Yes   Do you have allergies to medications, food, a vaccine component or latex?   No   Have you ever had a serious reaction after receiving a vaccination?   No   Do you have a long-term health problem with heart, lung, kidney, or metabolic disease (e.g., diabetes), asthma, a blood disorder, no spleen, complement component deficiency, a cochlear implant, or a spinal fluid leak?  Are you on long-term aspirin therapy?   No   Do you have cancer, leukemia, HIV/AIDS, or any other immune system problem?   No   Do you have a parent, brother, or sister with an immune system problem?   No   In the past 3 months, have you taken medications that affect  your immune system, such as prednisone, other steroids, or anticancer drugs; drugs for the treatment of rheumatoid arthritis, Crohn s disease, or psoriasis; or have you had radiation treatments?   No   Have you had a seizure, or a brain or other nervous system problem?   No   During the past year, have you received a transfusion of blood or blood    products, or been given immune (gamma) globulin or antiviral drug?   No   For women: Are you pregnant or is there a chance you could become       pregnant during the next month?   No   Have you received any vaccinations in the past 4 weeks?   No     Immunization questionnaire was positive for at least one answer.  Notified Dr Busch.      Patient instructed to remain in clinic for 15 minutes afterwards, and to report any adverse reactions.      Screening performed by Fariba Le on 2/16/2024 at 11:39 AM.   Signed Electronically by: Sophie Busch MD

## 2024-02-21 ENCOUNTER — MYC MEDICAL ADVICE (OUTPATIENT)
Dept: FAMILY MEDICINE | Facility: CLINIC | Age: 39
End: 2024-02-21
Payer: COMMERCIAL

## 2024-02-21 DIAGNOSIS — H91.90 HEARING LOSS, UNSPECIFIED HEARING LOSS TYPE, UNSPECIFIED LATERALITY: Primary | ICD-10-CM

## 2024-02-21 NOTE — TELEPHONE ENCOUNTER
Sometimes there can be some temporary hearing problems, not usually permanent but I understand his concern. I would advise scheduling with audiology to test his hearing once his antibiotics are done and he feels better overall. I will put in a referral if he would like to follow through with that. Let me know if any other problems.

## 2024-02-21 NOTE — TELEPHONE ENCOUNTER
RN attempted to contact patient, but no answer. Left message on patient's voice mail to call clinic back.    Elena Ramsay RN  [facility]    amoxicillin (AMOXIL) 875 MG tablet     [facility] Family Medicine/Ob Support Pool (supporting Sophie Busch MD)     Good morning. I m following up after being on antibiotics for 5 days, the worst of the ear pressure is gone but I m having some uncomfortable hearing loss. It still sounds like I have an ear plug in. That evening, after my Friday visit, the ear started leaking fluid. No blood. It did that for 3 days.      When should I be concerned about permanent damage? Are there cases like mine where hearing loss occurs but then gets better?     Thanks for reading my note.     -Morro Coronel

## 2024-02-21 NOTE — TELEPHONE ENCOUNTER
Pt called back: stating he has on-going symptoms since day one      - ear pressure is gone  - ear leakage is gone   - feels like there is still some ear plug (hearing loss). feels like there water in the ear. Pt is worried he might have permanent hearing loss  - feels congested in the morning. But feels better during the day    Would like advise from provider.      Sam Narvaez Cem Say, BSN RN  [facility]

## 2024-02-21 NOTE — TELEPHONE ENCOUNTER
"Spoke to pt and relayed PCP message below. Advised pt a representative will be reaching out to him to schedule appt within 2 business day. Provided pt with number [phone]) to call if pt doesn't hear from them. Pt verb understanding and agreed with plan. No further action required.     Also sent message via [employer] per pt's request as he was driving during conversation.     Margareth DO RN  [facility]          \"Sometimes there can be some temporary hearing problems, not usually permanent but I understand his concern. I would advise scheduling with audiology to test his hearing once his antibiotics are done and he feels better overall. I will put in a referral if he would like to follow through with that. Let me know if any other problems.\" - Sophie Busch MD    .   "

## 2024-03-04 ENCOUNTER — OFFICE VISIT (OUTPATIENT)
Dept: FAMILY MEDICINE | Facility: CLINIC | Age: 39
End: 2024-03-04
Payer: COMMERCIAL

## 2024-03-04 VITALS
BODY MASS INDEX: 24.99 KG/M2 | DIASTOLIC BLOOD PRESSURE: 83 MMHG | HEART RATE: 69 BPM | OXYGEN SATURATION: 98 % | SYSTOLIC BLOOD PRESSURE: 142 MMHG | RESPIRATION RATE: 16 BRPM | TEMPERATURE: 98.4 F | WEIGHT: 201 LBS

## 2024-03-04 DIAGNOSIS — B96.89 ACUTE BACTERIAL RHINOSINUSITIS: Primary | ICD-10-CM

## 2024-03-04 DIAGNOSIS — J01.90 ACUTE BACTERIAL RHINOSINUSITIS: Primary | ICD-10-CM

## 2024-03-04 PROCEDURE — 99213 OFFICE O/P EST LOW 20 MIN: CPT | Performed by: NURSE PRACTITIONER

## 2024-03-04 NOTE — PATIENT INSTRUCTIONS
Sinus rinses, tylenol/ibuprofen, mucinex     Start augmentin.      Recheck if not much better in 4-5 days (evisit).      We usually give ear plugging with colds 6 weeks to resolve.

## 2024-03-04 NOTE — PROGRESS NOTES
"Assessment & Plan     Acute bacterial rhinosinusitis    - amoxicillin-clavulanate (AUGMENTIN) 875-125 MG tablet  Dispense: 14 tablet; Refill: 0       Patient who had viral URI associated with otitis media treated with amoxicillin for 10 days starting on 16 February.  Feels like symptoms of the ear pain resolved with persistent plugging especially on the left, but no pain.  Congestion not completely resolved however.  Says the congestion prior to this rebounded and he is continuing to have green nasal drainage.  Complicating matters, children at home have been sick, so he is unsure if this is a second thing or a worsening of the first viral URI.    Given option with shared decision making to wait another 3 to 4 days to see if it resolves versus treating now for possible secondary acute bacterial rhinosinusitis.    Patient would like to start antibiotics now.    Augmentin, Tylenol, sinus rinses, Mucinex.  Recheck if not better in 4 to 5 days.  Okay for e-visit.          Return in about 6 weeks (around 4/15/2024).    Azucena Hodgson, [facility]    Yony Hooker is a 38 year old male who presents to clinic today for the following health issues:  Chief Complaint   Patient presents with    Sinus Problem     Ongoing issues x 3wks (pt states possible bacterial sinus), would like ears checked, some coughing, no fever (did feel feverish), headaches and dizziness     HPI    Had an ear infection associated with URI that was treated on February 16.  Ear was feeling improved with amoxicillin. Congestion never completely resolved.  Congestion came back about a week ago. \"Felt like I had a new virus on the 26th.\"   \"Ear infection started draining\" from left ear.  Muffled hearing on left x 2 weeks.      Says not changed since the 26th.  Wakes up with plugged nostrils.  Greenish, thick.  Draining.  Has to cough in the morning.      Using saline spray.  Flonase.     Temporal headache.  No facial " pain.      Last night, felt sweaty hot flashes, dizziness, more fatigue.          Review of Systems    See HPI        Objective    BP (!) 142/83   Pulse 69   Temp 98.4  F (36.9  C) (Oral)   Resp 16   Wt 91.2 kg (201 lb)   SpO2 98%   BMI 24.99 kg/m    Physical Exam  Constitutional:       Appearance: He is well-developed.   HENT:      Right Ear: Tympanic membrane and external ear normal. There is no impacted cerumen.      Left Ear: Tympanic membrane and external ear normal. There is no impacted cerumen.      Nose: Congestion present.      Right Nostril: No occlusion.      Left Nostril: No occlusion.      Right Sinus: No maxillary sinus tenderness or frontal sinus tenderness.      Left Sinus: No maxillary sinus tenderness or frontal sinus tenderness.      Comments: Patient states pressure in face is worse with leaning forward.     Mouth/Throat:      Pharynx: Posterior oropharyngeal erythema (Mild) present. No oropharyngeal exudate.   Eyes:      General:         Right eye: No discharge.         Left eye: No discharge.      Conjunctiva/sclera: Conjunctivae normal.   Pulmonary:      Effort: Pulmonary effort is normal.   Musculoskeletal:         General: Normal range of motion.   Skin:     General: Skin is warm.   Neurological:      Mental Status: He is alert and oriented to person, place, and time.   Psychiatric:         Mood and Affect: Mood normal.         Behavior: Behavior normal.         Thought Content: Thought content normal.         Judgment: Judgment normal.

## 2024-03-07 ENCOUNTER — OFFICE VISIT (OUTPATIENT)
Dept: PEDIATRICS | Facility: CLINIC | Age: 39
End: 2024-03-07
Payer: COMMERCIAL

## 2024-03-07 ENCOUNTER — NURSE TRIAGE (OUTPATIENT)
Dept: NURSING | Facility: CLINIC | Age: 39
End: 2024-03-07
Payer: COMMERCIAL

## 2024-03-07 VITALS
RESPIRATION RATE: 16 BRPM | WEIGHT: 193.3 LBS | TEMPERATURE: 98.6 F | SYSTOLIC BLOOD PRESSURE: 112 MMHG | HEART RATE: 83 BPM | DIASTOLIC BLOOD PRESSURE: 80 MMHG | HEIGHT: 74 IN | BODY MASS INDEX: 24.81 KG/M2 | OXYGEN SATURATION: 100 %

## 2024-03-07 DIAGNOSIS — R19.7 DIARRHEA, UNSPECIFIED TYPE: Primary | ICD-10-CM

## 2024-03-07 LAB

## 2024-03-07 PROCEDURE — 87493 C DIFF AMPLIFIED PROBE: CPT | Performed by: PHYSICIAN ASSISTANT

## 2024-03-07 PROCEDURE — 99213 OFFICE O/P EST LOW 20 MIN: CPT | Performed by: PHYSICIAN ASSISTANT

## 2024-03-07 PROCEDURE — 87507 IADNA-DNA/RNA PROBE TQ 12-25: CPT | Mod: 59 | Performed by: PHYSICIAN ASSISTANT

## 2024-03-07 ASSESSMENT — ENCOUNTER SYMPTOMS: DIARRHEA: 1

## 2024-03-07 ASSESSMENT — PAIN SCALES - GENERAL: PAINLEVEL: NO PAIN (0)

## 2024-03-07 NOTE — TELEPHONE ENCOUNTER
For 36 hours has diarrhea while being on antibiotics since Monday. It seems to be getting worse.  I connected with scheduling for an appointment and advised urgent care if they can't get him in.  Yvonne Julian RN  [facility]    Reason for Disposition   SEVERE diarrhea (e.g., 7 or more times / day more than normal)    Additional Information   Negative: Shock suspected (e.g., cold/pale/clammy skin, too weak to stand, low BP, rapid pulse)   Negative: Difficult to awaken or acting confused (e.g., disoriented, slurred speech)   Negative: Sounds like a life-threatening emergency to the triager   Negative: Vomiting also present and worse than the diarrhea   Negative: [1] Blood in stool AND [2] without diarrhea   Negative: Diarrhea in a cancer patient who is currently (or recently) receiving chemotherapy or radiation therapy, or cancer patient who has metastatic or end-stage cancer and is receiving palliative care   Negative: SEVERE abdominal pain (e.g., excruciating)     Cramping when he needs to go   Negative: [1] Blood in the stool AND [2] moderate or large amount of blood (e.g., any blood clots, passing blood without stool, toilet water turns red)   Negative: Black or tarry bowel movements  (Exception: Chronic-unchanged black-grey BMs AND is taking iron pills or Pepto-Bismol.)   Negative: [1] Drinking very little AND [2] dehydration suspected (e.g., no urine > 12 hours, very dry mouth, very lightheaded)   Negative: Patient sounds very sick or weak to the triager    Protocols used: Diarrhea on Antibiotics-A-

## 2024-03-07 NOTE — PROGRESS NOTES
Assessment & Plan     Diarrhea, unspecified type  Patient with 10+ stools in the last few days.   Patient has been on Augmentin so it could be due to the antibiotic. He was also on Amoxicillin earlier this month so it could be C. Diff.   Since patient is completing stool sample and he is having a significant number of stools have also ordered Stool panel.   If patient develops significant abdominal pain, vomiting, inability to keep fluids down or develops fever recommend ER evaluation for fluids and additional testing.  Can send [employer] message with update.   Depending on results will help decide what to the next steps are.   Recommend adding in daily probiotic at this time.   - Enteric Bacteria and Virus Panel by [facility] Stool  - C. difficile Toxin B PCR with reflex to C. difficile Antigen and Toxins A/B EIA  - Enteric Bacteria and Virus Panel by [facility] Stool  - C. difficile Toxin B PCR with reflex to C. difficile Antigen and Toxins A/B EIA            Yony Hooker is a 38 year old, presenting for the following health issues:  Diarrhea (Onset since 3 days. Has had a fever and chills. He was on amoxicillin and Augmentin. Last dose of Augmentin was yesterday.)        3/7/2024    12:54 PM   Additional Questions   Roomed by Margareth   Accompanied by self         3/7/2024    12:54 PM   Patient Reported Additional Medications   Patient reports taking the following new medications no     History of Present Illness       Reason for visit:  Frequent diahrrea  Symptom onset:  1-3 days ago  Symptom intensity:  Severe  Symptom progression:  Worsening  Had these symptoms before:  No  What makes it worse:  Eating  What makes it better:  Staying hydrated    He eats 0-1 servings of fruits and vegetables daily.He consumes 1 sweetened beverage(s) daily.He exercises with enough effort to increase his heart rate 9 or less minutes per day.  He exercises with enough effort to increase his heart rate 3 or less days per week.   He is taking  "medications regularly.         Diarrhea  Onset/Duration: 3 days  Description:       Consistency of stool: watery       Blood in stool: No       Number of loose stools past 24 hours: 14 or more  Progression of Symptoms: worsening  Accompanying signs and symptoms:       Fever: YES       Nausea/Vomiting: No       Abdominal pain: YES- only cramping       Weight loss: YES       Episodes of constipation: No  History   Ill contacts: YES- family members  Recent use of antibiotics: YES  Recent travels: No  Recent medication-new or changes(Rx or OTC): YES- antibiotic use no other medication changes  Precipitating or alleviating factors: None  Therapies tried and outcome: none      Patient reports Feb 10th he came down with a viral cold. He developed ear pain and was treated with amoxicillin on the 16th for ear pain (10 day course). Patient states the ears got better but her continued to have facial pain. On 3/4 patient was seen and started on Augmentin for sinus infection. Pt reports those symptoms have improved but he now has been having 10+ stools a day since Tuesday. He stopped taking Augmentin yesterday after the morning dose. He has not noticed any improvements. Has some cramping but no significant abdominal pain. Denies fevers, vomiting, headache, cough.    Review of Systems  Constitutional, HEENT, cardiovascular, pulmonary, gi and gu systems are negative, except as otherwise noted.      Objective    /80 (BP Location: Right arm, Patient Position: Sitting, Cuff Size: Adult Regular)   Pulse 83   Temp 98.6  F (37  C) (Tympanic)   Resp 16   Ht 1.88 m (6' 2\")   Wt 87.7 kg (193 lb 4.8 oz)   SpO2 100%   BMI 24.82 kg/m    Body mass index is 24.82 kg/m .    Physical Exam   GENERAL: alert and no distress  EYES: Eyes grossly normal to inspection, PERRL and conjunctivae and sclerae normal  HENT: ear canals and TM's normal, nose and mouth without ulcers or lesions  RESP: lungs clear to auscultation - no rales, rhonchi " or wheezes  CV: regular rate and rhythm, normal S1 S2, no S3 or S4, no murmur, click or rub, no peripheral edema  ABDOMEN: soft, nontender, no hepatosplenomegaly, no masses and bowel sounds normal  MS: no gross musculoskeletal defects noted, no edema  SKIN: no suspicious lesions or rashes  NEURO: Normal strength and tone, mentation intact and speech normal  PSYCH: mentation appears normal, affect normal/bright        Signed Electronically by: Sarita Grissom PA-C

## 2024-03-11 NOTE — PROGRESS NOTES
"AUDIOLOGY REPORT    SUBJECTIVE:  Morro Coronel is a 38 year old male who was seen in the Audiology Clinic at the [facility] for audiologic evaluation, referred by Sophie Busch M.D. They were accompanied today by their self. Patient reports he had a left ear infection 3-4 weeks ago, treated with oral antibiotics. States the hearing in the left was reduced, it has improved. Was having left otalgia, and otorrhea. No current otorrhea, only occasional otalgia now. Left ear was plugged, has resolved. Feels he still hears a, \"quiet ringing\" in the left ear. Reports some dizziness, possibly related to a medication. Denied history of ear surgery, and family history of hearing loss.     OBJECTIVE:  Abuse Screening:  Did not administer     Fall Risk Screen:  Did not administer     Otoscopic exam indicates ears are clear of cerumen bilaterally     Pure Tone Thresholds assessed using conventional audiometry with good reliability from 250-8000 Hz bilaterally using insert earphones and circumaural headphones.     RIGHT:   Normal hearing sensitivity     LEFT:    Normal hearing sensitivity     Tympanogram:    RIGHT: Normal eardrum mobility    LEFT:   Normal eardrum mobility    Reflexes (reported by stimulus ear):  RIGHT: Ipsilateral is present at normal levels  RIGHT: Contralateral is absent at frequencies tested  LEFT:   Ipsilateral is present at normal levels  LEFT:   Contralateral is present at normal levels    Speech Reception Threshold:    RIGHT: 10 dB HL    LEFT:   15 dB HL    Word Recognition Score:     RIGHT: 92% at 60 dB HL using NU-6 recorded word list.    LEFT: 100% at 60 dB HL using NU-6 recorded word list.    ASSESSMENT:   Today's results reveal normal hearing sensitivity bilaterally. Today s results were discussed with the patient in detail.     PLAN:  It is recommended that patient return to audiology if new concerns arise. Consider ENT follow up if left tinnitus persists/worsens. " Please call this clinic with questions regarding these results or recommendations.    Rosamaria Malagon, CCC-A  [place] Licensed Audiologist #[id number]

## 2024-03-14 ENCOUNTER — OFFICE VISIT (OUTPATIENT)
Dept: AUDIOLOGY | Facility: CLINIC | Age: 39
End: 2024-03-14
Payer: COMMERCIAL

## 2024-03-14 DIAGNOSIS — H91.90 HEARING LOSS, UNSPECIFIED HEARING LOSS TYPE, UNSPECIFIED LATERALITY: ICD-10-CM

## 2024-03-14 DIAGNOSIS — H93.12 TINNITUS OF LEFT EAR: Primary | ICD-10-CM

## 2024-03-14 PROCEDURE — 92557 COMPREHENSIVE HEARING TEST: CPT | Performed by: AUDIOLOGIST

## 2024-03-14 PROCEDURE — 92550 TYMPANOMETRY & REFLEX THRESH: CPT | Performed by: AUDIOLOGIST

## 2024-04-18 NOTE — PROGRESS NOTES
Preventive Care Visit  [facility]  Yudith DO Erika, Family Medicine  Apr 19, 2024      Assessment & Plan      Diagnosis Comments   1. Routine general medical examination at a health care facility  CBC with platelets, Comprehensive metabolic panel (BMP + Alb, Alk Phos, ALT, AST, Total. Bili, TP), Vitamin D Deficiency, Lipid Profile (Chol, Trig, HDL, LDL calc), Hemoglobin A1c, TSH with free T4 reflex       2. Flexural eczema  triamcinolone (KENALOG) 0.1 % external ointment       3. Moderate episode of recurrent major depressive disorder (H)  sertraline (ZOLOFT) 50 MG tablet, IN BEHAV ASSMT W/SCORE & DOCD/STAND INSTRUMENT       4. JEAN-PAUL (generalized anxiety disorder)  sertraline (ZOLOFT) 100 MG tablet, sertraline (ZOLOFT) 50 MG tablet, IN BEHAV ASSMT W/SCORE & DOCD/STAND INSTRUMENT       5. Alcohol use                Patient is a 37-year-old male with PMH of alcohol use, eczema, anxiety who presents today for an annual physical.     Home life wife + kids   Occupation:stable but busy.  Instead like season further work and he stressed about that.  Sexually active: Yes  Safety concerns:no  Diet/exercise: diet is poor recently 2/2 busy schedule   Family history of cancers:no   Eye exam/dental exam:needs updated eye exam   Alcohol use:see discussion below   Tobacco use/marijuana use/drug use: Uses CBD Gummies at night once in a while.  Mental health:see discussion below   Vaccines: Ordered hep B  Blood work:ordered      Too young for any of the cancer screenings       MDD/JEAN-PAUL  Alcohol use  PHQ-9 score 6  JEAN-PAUL-7 score 6  No active SI/HI  Last time patient was here, he has started therapy and that has been really helpful for him.  Was exploring food triggers for his drinking.  He had opted to stay on his dose of Zoloft 200 mg at that time.  Today, he notes that he is continuing to go to therapy on a weekly basis.  That is been extremely beneficial for him.  He looks forward to therapy.  Drinking has  "been \"a little bit of a less struggle\" than it used to be.  He still drinking but \"max drinks at a time\" is about 4-5 beers.  He is focusing on working through some childhood trauma with his therapist.  Notes that he is stressed about work guidelines currently.  Has also been feeling ill for most of March and just starting to feel better physically.  He actually thinks his current dose of Zoloft is too high.  He feels like he is unable to access some of his emotions and he is slightly on autopilot around his kids.  That does bother him and he is wondering if he can go down on the dose.  Plan:  - Congratulated him on continuing to go to therapy  - Alcohol cessation counseling provided  - Will go down on Zoloft 150 mg and follow-up in 2 to 3 months        10/23/2023     8:42 AM 11/28/2023     9:42 AM 4/19/2024     3:40 PM   PHQ   PHQ-9 Total Score 5 1 6   Q9: Thoughts of better off dead/self-harm past 2 weeks Not at all Not at all Not at all             10/18/2023     8:31 AM 11/28/2023     9:42 AM 4/19/2024     3:40 PM   JEAN-PAUL-7 SCORE   Total Score 8 (mild anxiety) 0 (minimal anxiety) 6 (mild anxiety)   Total Score 8 0 6           Patient has been advised of split billing requirements and indicates understanding: Yes    Counseling  Appropriate preventive services were discussed with this patient, including applicable screening as appropriate for fall prevention, nutrition, physical activity, Tobacco-use cessation, weight loss and cognition.  Checklist reviewing preventive services available has been given to the patient.  Reviewed patient's diet, addressing concerns and/or questions.   He is at risk for psychosocial distress and has been provided with information to reduce risk.   The patient's PHQ-9 score is consistent with mild depression. He was provided with information regarding depression.     Yony Hooker is a 39 year old, presenting for the following:  Physical (Physical and mood follow up today. Patient " states his depression goes up and down and is staying the same, no new struggles. His anxiety has been increasing. He would like to decrease Zoloft medication and he just feels emotional unavailable. )        4/19/2024     3:48 PM   Additional Questions   Roomed by Galina HODGES MA   Accompanied by Self        Health Care Directive  Patient does not have a Health Care Directive or Living Will: Discussed advance care planning with patient; information given to patient to review.    HPI        4/19/2024   General Health   How would you rate your overall physical health? Good   Feel stress (tense, anxious, or unable to sleep) Only a little   (!) STRESS CONCERN      4/19/2024   Nutrition   Three or more servings of calcium each day? (!) NO   Diet: I don't know   How many servings of fruit and vegetables per day? (!) 0-1   How many sweetened beverages each day? 0-1         4/19/2024   Exercise   Days per week of moderate/strenous exercise 0 days   (!) EXERCISE CONCERN      4/19/2024   Social Factors   Frequency of gathering with friends or relatives Once a week   Worry food won't last until get money to buy more No   Food not last or not have enough money for food? No   Do you have housing?  Yes   Are you worried about losing your housing? No   Lack of transportation? No   Unable to get utilities (heat,electricity)? No         4/19/2024   Dental   Dentist two times every year? Yes         4/19/2024   TB Screening   Were you born outside of the [place]? No       Today's PHQ-9 Score:       4/19/2024     3:40 PM   PHQ-9 SCORE   PHQ-9 Total Score MyChart 6 (Mild depression)   PHQ-9 Total Score 6         4/19/2024   Substance Use   Alcohol more than 3/day or more than 7/wk No   Do you use any other substances recreationally? (!) ALCOHOL    (!) CANNABIS PRODUCTS     Social History     Tobacco Use    Smoking status: Never     Passive exposure: Never    Smokeless tobacco: Never   Vaping Use    Vaping status: Never Used   Substance Use  Topics    Alcohol use: Yes     Alcohol/week: 11.0 standard drinks of alcohol     Comment: 1-3 drinks/week    Drug use: No             4/19/2024   One time HIV Screening   Previous HIV test? No         4/19/2024   STI Screening   New sexual partner(s) since last STI/HIV test? No         4/19/2024   Contraception/Family Planning   Questions about contraception or family planning No        Reviewed and updated as needed this visit by Provider                    Past Medical History:   Diagnosis Date    Atopic dermatitis     left arm, hands, eyelids    Seasonal allergies      Past Surgical History:   Procedure Laterality Date    thumb surgery      as a young child     OB History   No obstetric history on file.     Lab work is in process  Labs reviewed in EPIC  BP Readings from Last 3 Encounters:   04/19/24 120/72   03/07/24 112/80   03/04/24 (!) 142/83    Wt Readings from Last 3 Encounters:   04/19/24 88 kg (194 lb 1.6 oz)   03/07/24 87.7 kg (193 lb 4.8 oz)   03/04/24 91.2 kg (201 lb)                  Patient Active Problem List   Diagnosis    CARDIOVASCULAR SCREENING; LDL GOAL LESS THAN 160    Alcohol use    Eczema, unspecified type    Anxiety    Moderate episode of recurrent major depressive disorder (H)     Past Surgical History:   Procedure Laterality Date    thumb surgery      as a young child       Social History     Tobacco Use    Smoking status: Never     Passive exposure: Never    Smokeless tobacco: Never   Substance Use Topics    Alcohol use: Yes     Alcohol/week: 11.0 standard drinks of alcohol     Comment: 1-3 drinks/week     Family History   Problem Relation Age of Onset    Hypertension Father     Depression Father     Diabetes Maternal Grandmother     Coronary Artery Disease Paternal Grandmother     Diabetes Maternal Uncle     Coronary Artery Disease Paternal Uncle          Current Outpatient Medications   Medication Sig Dispense Refill    cetirizine (ZYRTEC) 10 MG tablet Take 10 mg by mouth daily       "finasteride (PROPECIA) 1 MG tablet       sertraline (ZOLOFT) 100 MG tablet Take 1 tablet (100 mg) by mouth daily 90 tablet 3    sertraline (ZOLOFT) 50 MG tablet Take 1 tablet (50 mg) by mouth daily 90 tablet 1    triamcinolone (KENALOG) 0.1 % external ointment Apply topically 2 times daily 80 g 3    fluticasone (FLONASE) 50 MCG/ACT nasal spray Spray 1 spray into both nostrils as needed       Allergies   Allergen Reactions    No Known Allergies          Review of Systems  Constitutional, neuro, ENT, endocrine, pulmonary, cardiac, gastrointestinal, genitourinary, musculoskeletal, integument and psychiatric systems are negative, except as otherwise noted.     Objective    Exam  /72 (BP Location: Right arm, Patient Position: Sitting, Cuff Size: Adult Regular)   Pulse 69   Temp 98.1  F (36.7  C) (Oral)   Resp 16   Ht 1.886 m (6' 2.25\")   Wt 88 kg (194 lb 1.6 oz)   SpO2 98%   BMI 24.75 kg/m     Estimated body mass index is 24.75 kg/m  as calculated from the following:    Height as of this encounter: 1.886 m (6' 2.25\").    Weight as of this encounter: 88 kg (194 lb 1.6 oz).    Physical Exam  GENERAL: alert and no distress  NECK: no adenopathy, no asymmetry, masses, or scars  RESP: lungs clear to auscultation - no rales, rhonchi or wheezes  CV: regular rate and rhythm,  no murmur, click or rub, no peripheral edema  ABDOMEN: soft, nontender, no hepatosplenomegaly, no masses and bowel sounds normal  MS: no gross musculoskeletal defects noted, no edema  PSYCH: mentation appears normal, affect mildly down and anxious but overall appropriate      Signed Electronically by: Yudith James DO    "

## 2024-04-18 NOTE — PATIENT INSTRUCTIONS
Preventive Care Advice   This is general advice given by our system to help you stay healthy. However, your care team may have specific advice just for you. Please talk to your care team about your preventive care needs.  Nutrition  Eat 5 or more servings of fruits and vegetables each day.  Try wheat bread, brown rice and whole grain pasta (instead of white bread, rice, and pasta).  Get enough calcium and vitamin D. Check the label on foods and aim for 100% of the RDA (recommended daily allowance).  Lifestyle  Exercise at least 150 minutes each week   (30 minutes a day, 5 days a week).  Do muscle strengthening activities 2 days a week. These help control your weight and prevent disease.  No smoking.  Wear sunscreen to prevent skin cancer.  Have a dental exam and cleaning every 6 months.  Yearly exams  See your health care team every year to talk about:  Any changes in your health.  Any medicines your care team has prescribed.  Preventive care, family planning, and ways to prevent chronic diseases.  Shots (vaccines)   HPV shots (up to age 26), if you've never had them before.  Hepatitis B shots (up to age 59), if you've never had them before.  COVID-19 shot: Get this shot when it's due.  Flu shot: Get a flu shot every year.  Tetanus shot: Get a tetanus shot every 10 years.  Pneumococcal, hepatitis A, and RSV shots: Ask your care team if you need these based on your risk.  Shingles shot (for age 50 and up).  General health tests  Diabetes screening:  Starting at age 35, Get screened for diabetes at least every 3 years.  If you are younger than age 35, ask your care team if you should be screened for diabetes.  Cholesterol test: At age 39, start having a cholesterol test every 5 years, or more often if advised.  Bone density scan (DEXA): At age 50, ask your care team if you should have this scan for osteoporosis (brittle bones).  Hepatitis C: Get tested at least once in your life.  STIs (sexually transmitted  infections)  Before age 24: Ask your care team if you should be screened for STIs.  After age 24: Get screened for STIs if you're at risk. You are at risk for STIs (including HIV) if:  You are sexually active with more than one person.  You don't use condoms every time.  You or a partner was diagnosed with a sexually transmitted infection.  If you are at risk for HIV, ask about PrEP medicine to prevent HIV.  Get tested for HIV at least once in your life, whether you are at risk for HIV or not.  Cancer screening tests  Cervical cancer screening: If you have a cervix, begin getting regular cervical cancer screening tests at age 21. Most people who have regular screenings with normal results can stop after age 65. Talk about this with your provider.  Breast cancer scan (mammogram): If you've ever had breasts, begin having regular mammograms starting at age 40. This is a scan to check for breast cancer.  Colon cancer screening: It is important to start screening for colon cancer at age 45.  Have a colonoscopy test every 10 years (or more often if you're at risk) Or, ask your provider about stool tests like a FIT test every year or Cologuard test every 3 years.  To learn more about your testing options, visit: [email or web address]/[email or web address].  For help making a decision, visit: https://bit.ly/oa82073.  Prostate cancer screening test: If you have a prostate and are age 55 to 69, ask your provider if you would benefit from a yearly prostate cancer screening test.  Lung cancer screening: If you are a current or former smoker age 50 to 80, ask your care team if ongoing lung cancer screenings are right for you.  For informational purposes only. Not to replace the advice of your health care provider. Copyright   2023 [employer]. All rights reserved. Clinically reviewed by the [facility] Transitions Program. [employer] [id number] - REV 01/24.

## 2024-04-19 ENCOUNTER — OFFICE VISIT (OUTPATIENT)
Dept: FAMILY MEDICINE | Facility: CLINIC | Age: 39
End: 2024-04-19
Payer: COMMERCIAL

## 2024-04-19 VITALS
TEMPERATURE: 98.1 F | SYSTOLIC BLOOD PRESSURE: 120 MMHG | OXYGEN SATURATION: 98 % | WEIGHT: 194.1 LBS | RESPIRATION RATE: 16 BRPM | BODY MASS INDEX: 24.91 KG/M2 | HEART RATE: 69 BPM | HEIGHT: 74 IN | DIASTOLIC BLOOD PRESSURE: 72 MMHG

## 2024-04-19 DIAGNOSIS — L20.82 FLEXURAL ECZEMA: ICD-10-CM

## 2024-04-19 DIAGNOSIS — Z78.9 ALCOHOL USE: ICD-10-CM

## 2024-04-19 DIAGNOSIS — F41.1 GAD (GENERALIZED ANXIETY DISORDER): ICD-10-CM

## 2024-04-19 DIAGNOSIS — F33.1 MODERATE EPISODE OF RECURRENT MAJOR DEPRESSIVE DISORDER (H): ICD-10-CM

## 2024-04-19 DIAGNOSIS — Z00.00 ROUTINE GENERAL MEDICAL EXAMINATION AT A HEALTH CARE FACILITY: Primary | ICD-10-CM

## 2024-04-19 LAB
ERYTHROCYTE [DISTWIDTH] IN BLOOD BY AUTOMATED COUNT: 12.8 % (ref 10–15)
HBA1C MFR BLD: 5.4 % (ref 0–5.6)
HCT VFR BLD AUTO: 39.7 % (ref 40–53)
HGB BLD-MCNC: 13.8 G/DL (ref 13.3–17.7)
MCH RBC QN AUTO: 28.2 PG (ref 26.5–33)
MCHC RBC AUTO-ENTMCNC: 34.8 G/DL (ref 31.5–36.5)
MCV RBC AUTO: 81 FL (ref 78–100)
PLATELET # BLD AUTO: 274 10E3/UL (ref 150–450)
RBC # BLD AUTO: 4.9 10E6/UL (ref 4.4–5.9)
WBC # BLD AUTO: 7.2 10E3/UL (ref 4–11)

## 2024-04-19 PROCEDURE — 80061 LIPID PANEL: CPT | Performed by: STUDENT IN AN ORGANIZED HEALTH CARE EDUCATION/TRAINING PROGRAM

## 2024-04-19 PROCEDURE — 36415 COLL VENOUS BLD VENIPUNCTURE: CPT | Performed by: STUDENT IN AN ORGANIZED HEALTH CARE EDUCATION/TRAINING PROGRAM

## 2024-04-19 PROCEDURE — 85027 COMPLETE CBC AUTOMATED: CPT | Performed by: STUDENT IN AN ORGANIZED HEALTH CARE EDUCATION/TRAINING PROGRAM

## 2024-04-19 PROCEDURE — 90746 HEPB VACCINE 3 DOSE ADULT IM: CPT | Performed by: STUDENT IN AN ORGANIZED HEALTH CARE EDUCATION/TRAINING PROGRAM

## 2024-04-19 PROCEDURE — 99395 PREV VISIT EST AGE 18-39: CPT | Mod: 25 | Performed by: STUDENT IN AN ORGANIZED HEALTH CARE EDUCATION/TRAINING PROGRAM

## 2024-04-19 PROCEDURE — 84443 ASSAY THYROID STIM HORMONE: CPT | Performed by: STUDENT IN AN ORGANIZED HEALTH CARE EDUCATION/TRAINING PROGRAM

## 2024-04-19 PROCEDURE — 83036 HEMOGLOBIN GLYCOSYLATED A1C: CPT | Performed by: STUDENT IN AN ORGANIZED HEALTH CARE EDUCATION/TRAINING PROGRAM

## 2024-04-19 PROCEDURE — 90471 IMMUNIZATION ADMIN: CPT | Performed by: STUDENT IN AN ORGANIZED HEALTH CARE EDUCATION/TRAINING PROGRAM

## 2024-04-19 PROCEDURE — 82306 VITAMIN D 25 HYDROXY: CPT | Performed by: STUDENT IN AN ORGANIZED HEALTH CARE EDUCATION/TRAINING PROGRAM

## 2024-04-19 PROCEDURE — 80053 COMPREHEN METABOLIC PANEL: CPT | Performed by: STUDENT IN AN ORGANIZED HEALTH CARE EDUCATION/TRAINING PROGRAM

## 2024-04-19 PROCEDURE — 96127 BRIEF EMOTIONAL/BEHAV ASSMT: CPT | Performed by: STUDENT IN AN ORGANIZED HEALTH CARE EDUCATION/TRAINING PROGRAM

## 2024-04-19 PROCEDURE — 99214 OFFICE O/P EST MOD 30 MIN: CPT | Mod: 25 | Performed by: STUDENT IN AN ORGANIZED HEALTH CARE EDUCATION/TRAINING PROGRAM

## 2024-04-19 RX ORDER — SERTRALINE HYDROCHLORIDE 100 MG/1
100 TABLET, FILM COATED ORAL DAILY
Qty: 90 TABLET | Refills: 3 | Status: SHIPPED | OUTPATIENT
Start: 2024-04-19 | End: 2024-07-23

## 2024-04-19 RX ORDER — TRIAMCINOLONE ACETONIDE 1 MG/G
OINTMENT TOPICAL 2 TIMES DAILY
Qty: 80 G | Refills: 3 | Status: SHIPPED | OUTPATIENT
Start: 2024-04-19

## 2024-04-19 SDOH — HEALTH STABILITY: PHYSICAL HEALTH: ON AVERAGE, HOW MANY DAYS PER WEEK DO YOU ENGAGE IN MODERATE TO STRENUOUS EXERCISE (LIKE A BRISK WALK)?: 0 DAYS

## 2024-04-19 ASSESSMENT — ANXIETY QUESTIONNAIRES
3. WORRYING TOO MUCH ABOUT DIFFERENT THINGS: NOT AT ALL
2. NOT BEING ABLE TO STOP OR CONTROL WORRYING: SEVERAL DAYS
6. BECOMING EASILY ANNOYED OR IRRITABLE: SEVERAL DAYS
GAD7 TOTAL SCORE: 6
4. TROUBLE RELAXING: SEVERAL DAYS
GAD7 TOTAL SCORE: 6
GAD7 TOTAL SCORE: 6
7. FEELING AFRAID AS IF SOMETHING AWFUL MIGHT HAPPEN: SEVERAL DAYS
IF YOU CHECKED OFF ANY PROBLEMS ON THIS QUESTIONNAIRE, HOW DIFFICULT HAVE THESE PROBLEMS MADE IT FOR YOU TO DO YOUR WORK, TAKE CARE OF THINGS AT HOME, OR GET ALONG WITH OTHER PEOPLE: SOMEWHAT DIFFICULT
8. IF YOU CHECKED OFF ANY PROBLEMS, HOW DIFFICULT HAVE THESE MADE IT FOR YOU TO DO YOUR WORK, TAKE CARE OF THINGS AT HOME, OR GET ALONG WITH OTHER PEOPLE?: SOMEWHAT DIFFICULT
5. BEING SO RESTLESS THAT IT IS HARD TO SIT STILL: SEVERAL DAYS
7. FEELING AFRAID AS IF SOMETHING AWFUL MIGHT HAPPEN: SEVERAL DAYS
1. FEELING NERVOUS, ANXIOUS, OR ON EDGE: SEVERAL DAYS

## 2024-04-19 ASSESSMENT — SOCIAL DETERMINANTS OF HEALTH (SDOH): HOW OFTEN DO YOU GET TOGETHER WITH FRIENDS OR RELATIVES?: ONCE A WEEK

## 2024-04-19 ASSESSMENT — PAIN SCALES - GENERAL: PAINLEVEL: NO PAIN (0)

## 2024-04-19 NOTE — LETTER
May 6, 2024      Morro Coronel  [address] [place] [address]        Dear ,    We are writing to inform you of your test results.      Vit D is quite low - would start taking Vit D3 [id number] international unit(s) daily and we will recheck in 3 months     Cholesterol levels are worse than last time. Almost borderline for starting meds. Would work on diet/exercise to help bring these numbers down. Repeat in 1 year.     Rest of the bloodwork is stable    Recent Results (from the past 720 hour(s))   CBC with platelets    Collection Time: 04/19/24  4:53 PM   Result Value Ref Range    WBC Count 7.2 4.0 - 11.0 10e3/uL    RBC Count 4.90 4.40 - 5.90 10e6/uL    Hemoglobin 13.8 13.3 - 17.7 g/dL    Hematocrit 39.7 (L) 40.0 - 53.0 %    MCV 81 78 - 100 fL    MCH 28.2 26.5 - 33.0 pg    MCHC 34.8 31.5 - 36.5 g/dL    RDW 12.8 10.0 - 15.0 %    Platelet Count 274 150 - 450 10e3/uL   Comprehensive metabolic panel (BMP + Alb, Alk Phos, ALT, AST, Total. Bili, TP)    Collection Time: 04/19/24  4:53 PM   Result Value Ref Range    Sodium 137 135 - 145 mmol/L    Potassium 4.0 3.4 - 5.3 mmol/L    Carbon Dioxide (CO2) 23 22 - 29 mmol/L    Anion Gap 12 7 - 15 mmol/L    Urea Nitrogen 13.3 6.0 - 20.0 mg/dL    Creatinine 0.96 0.67 - 1.17 mg/dL    GFR Estimate >90 >60 mL/min/1.73m2    Calcium 9.8 8.6 - 10.0 mg/dL    Chloride 102 98 - 107 mmol/L    Glucose 94 70 - 99 mg/dL    Alkaline Phosphatase 51 40 - 150 U/L    AST 21 0 - 45 U/L    ALT 17 0 - 70 U/L    Protein Total 7.6 6.4 - 8.3 g/dL    Albumin 5.1 3.5 - 5.2 g/dL    Bilirubin Total 0.4 <=1.2 mg/dL   Vitamin D Deficiency    Collection Time: 04/19/24  4:53 PM   Result Value Ref Range    Vitamin D, Total (25-Hydroxy) 19 (L) 20 - 50 ng/mL   Lipid Profile (Chol, Trig, HDL, LDL calc)    Collection Time: 04/19/24  4:53 PM   Result Value Ref Range    Cholesterol 233 (H) <200 mg/dL    Triglycerides 110 <150 mg/dL    Direct Measure HDL 40 >=40 mg/dL    LDL Cholesterol  Calculated 171 (H) <=100 mg/dL    Non HDL Cholesterol 193 (H) <130 mg/dL    Patient Fasting > 8hrs? Unknown    Hemoglobin A1c    Collection Time: 04/19/24  4:53 PM   Result Value Ref Range    Hemoglobin A1C 5.4 0.0 - 5.6 %   TSH with free T4 reflex    Collection Time: 04/19/24  4:53 PM   Result Value Ref Range    TSH 2.17 0.30 - 4.20 uIU/mL          If you have any questions or concerns, please call the clinic at the number listed above.       Sincerely,      Yudith James, DO

## 2024-04-19 NOTE — COMMUNITY RESOURCES LIST (ENGLISH)
April 19, 2024           YOUR PERSONALIZED LIST OF SERVICES & PROGRAMS           & RECREATION    Sports      of [place] [employer] [facility] [employer] - Sports clubs and recreational activities  [address] [place] [address] (Distance: 2.6 miles)  Phone: [phone]  Website: [email or web address]  Language: English  Fee: Self pay      Community Services [employer] [facility]  [address] [place] [address] (Distance: 5.7 miles)  Phone: [phone]  Website: [email or web address]://[employer]/seniors/  Language: English, [place]se  Fee: Free, Self pay, Sliding scale  Accessibility: [facility] services      [facility] - Adult Enrichment  Phone: [phone]  Website: [email or web address]-seniors/adult-enrichment/  Language: English  Hours: Mon 7:30 AM - 4:00 PM Tue 7:30 AM - 4:00 PM Wed 7:30 AM - 4:00 PM Thu 7:30 AM - 4:00 PM Fri 7:30 AM - 4:00 PM    Classes/Groups      of [place] - Gym or workout facility - [facility] [place] [facility]  [address] [place] [address] (Distance: 2.0 miles)  Language: English  Fee: Free, Self pay      [facility] - Personal training  [address] [place] [address] (Distance: 19.1 miles)  Phone: [phone]  Website: https://www.[employer]/  Language: English, [place]orese  Fee: Sliding scale, Self pay, [facility] - Care Coordination (Healthcare only)  Phone: [phone]  Website: [email or web address].[email or web address]  Language: English, [place]se  Hours: Wed 9:00 AM - 11:30 AM Thu 1:00 PM - 4:00 PM, 5:30 PM - 7:00 PM               IMPORTANT NUMBERS & WEBSITES        Emergency Services  911  .   [employer]  211 http://211unitedway.org  .   Poison Control  (870) 256-3202 http://[place]poison.org http://wis[place]poison.org  .     Suicide and Crisis Lifeline  988 http://988[facility].org  .   Childhelp [place] Child Abuse Hotline  351.954.2240  http://Childhelphotline.org   .   National Sexual Assault Hotline  (838) 507-5404 (HOPE) http://Rainn.org   .     National Runaway Safeline  (224) 346-8635 (RUNAWAY) http://SkyPhrase.org  .   Pregnancy & Postpartum Support  Call/text 735-144-2680  [place]: http://ppsupportmn.org  [place]: http://Evisors.com/wi  .   Substance Abuse National Helpline ([facility])  300-538-HELP (0130) http://Findtreatment.gov   .                DISCLAIMER: These resources have been generated via the [employer] Platform. [employer] does not endorse any service providers mentioned in this resource list. [employer] does not guarantee that the services mentioned in this resource list will be available to you or will improve your health or wellness.    [facility]

## 2024-04-20 LAB
ALBUMIN SERPL BCG-MCNC: 5.1 G/DL (ref 3.5–5.2)
ALP SERPL-CCNC: 51 U/L (ref 40–150)
ALT SERPL W P-5'-P-CCNC: 17 U/L (ref 0–70)
ANION GAP SERPL CALCULATED.3IONS-SCNC: 12 MMOL/L (ref 7–15)
AST SERPL W P-5'-P-CCNC: 21 U/L (ref 0–45)
BILIRUB SERPL-MCNC: 0.4 MG/DL
BUN SERPL-MCNC: 13.3 MG/DL (ref 6–20)
CALCIUM SERPL-MCNC: 9.8 MG/DL (ref 8.6–10)
CHLORIDE SERPL-SCNC: 102 MMOL/L (ref 98–107)
CHOLEST SERPL-MCNC: 233 MG/DL
CREAT SERPL-MCNC: 0.96 MG/DL (ref 0.67–1.17)
DEPRECATED HCO3 PLAS-SCNC: 23 MMOL/L (ref 22–29)
EGFRCR SERPLBLD CKD-EPI 2021: >90 ML/MIN/1.73M2
FASTING STATUS PATIENT QL REPORTED: ABNORMAL
GLUCOSE SERPL-MCNC: 94 MG/DL (ref 70–99)
HDLC SERPL-MCNC: 40 MG/DL
LDLC SERPL CALC-MCNC: 171 MG/DL
NONHDLC SERPL-MCNC: 193 MG/DL
POTASSIUM SERPL-SCNC: 4 MMOL/L (ref 3.4–5.3)
PROT SERPL-MCNC: 7.6 G/DL (ref 6.4–8.3)
SODIUM SERPL-SCNC: 137 MMOL/L (ref 135–145)
TRIGL SERPL-MCNC: 110 MG/DL
TSH SERPL DL<=0.005 MIU/L-ACNC: 2.17 UIU/ML (ref 0.3–4.2)
VIT D+METAB SERPL-MCNC: 19 NG/ML (ref 20–50)

## 2024-05-01 ENCOUNTER — VIRTUAL VISIT (OUTPATIENT)
Dept: PSYCHOLOGY | Facility: CLINIC | Age: 39
End: 2024-05-01
Payer: COMMERCIAL

## 2024-05-01 DIAGNOSIS — F41.1 GAD (GENERALIZED ANXIETY DISORDER): Primary | ICD-10-CM

## 2024-05-01 DIAGNOSIS — F33.0 MAJOR DEPRESSIVE DISORDER, RECURRENT EPISODE, MILD (H): ICD-10-CM

## 2024-05-01 PROCEDURE — 90791 PSYCH DIAGNOSTIC EVALUATION: CPT | Mod: 95 | Performed by: PSYCHOLOGIST

## 2024-05-01 ASSESSMENT — COLUMBIA-SUICIDE SEVERITY RATING SCALE - C-SSRS
1. IN THE PAST MONTH, HAVE YOU WISHED YOU WERE DEAD OR WISHED YOU COULD GO TO SLEEP AND NOT WAKE UP?: NO
ATTEMPT LIFETIME: NO
TOTAL  NUMBER OF INTERRUPTED ATTEMPTS LIFETIME: NO
2. HAVE YOU ACTUALLY HAD ANY THOUGHTS OF KILLING YOURSELF?: NO
TOTAL  NUMBER OF ABORTED OR SELF INTERRUPTED ATTEMPTS LIFETIME: NO
1. HAVE YOU WISHED YOU WERE DEAD OR WISHED YOU COULD GO TO SLEEP AND NOT WAKE UP?: YES
REASONS FOR IDEATION LIFETIME: MOSTLY TO END OR STOP THE PAIN (YOU COULDN'T GO ON LIVING WITH THE PAIN OR HOW YOU WERE FEELING)
6. HAVE YOU EVER DONE ANYTHING, STARTED TO DO ANYTHING, OR PREPARED TO DO ANYTHING TO END YOUR LIFE?: NO

## 2024-05-01 NOTE — PROGRESS NOTES
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  management of the household and or completion of tasks and work / vocational responsibilities.         Clinical Summary:  1. Psychosocial, Cultural and Contextual Factors: work stress  .  2. Principal DSM5 Diagnoses  (Sustained by DSM5 Criteria Listed Above):   296.31 (F33.0) Major Depressive Disorder, Recurrent Episode, Mild _  300.02 (F41.1) Generalized Anxiety Disorder.  RULE OUT: ADHD  5. Prognosis: Expect Improvement and Relieve Acute Symptoms.  6. Likely consequences of symptoms if not treated: issues at home/work.  7. Client strengths include:  educated, employed, goal-focused, good listener, has a previous history of therapy, insightful, intelligent, motivated, open to learning, open to suggestions / feedback, responsible parent, support of family, friends and providers, supportive, wants to learn, and willing to ask questions .     Recommendations:     1. Plan for Safety and Risk Management:   Safety and Risk: A safety and risk management plan has been developed including: Patient consented to co-developed safety plan on 5/1/24.  Safety and risk management plan was reviewed.   Patient agreed to use safety plan should any safety  concerns arise.  A copy was made available to the patient..          Report to child / adult protection services was NA.      4. Resources/Service Plan:    services are not indicated.   Modifications to assist communication are not indicated.   Additional disability accommodations are not indicated.      5. Collaboration:   Collaboration / coordination of treatment will be initiated with the following  support professionals: primary care physician and outpatient therapist.      6.  Referrals:   The following referral(s) will be initiated:  NA .       A Release of Information has been obtained for the following:  TBD if ODILON for therapist is warranted .     Clinical Substantiation/medical necessity for the above recommendations:  Medical necessity criteria is warranted in order to: Measure a psychological disorder and its severity and functional impairment to determine psychiatric diagnosis when a mental illness is suspected, or to achieve a differential diagnosis from a range of medical/psychological disorders that present with similar constellations of symptoms (e.g., determination and measurement of anxiety severity and impact in the presence of ongoing asthma or heart disease), Perform symptom measurement to objectively measure treatment effectiveness and/or determine the need to refer for pharmacological treatment or other medical evaluation (e.g., based on severity and chronicity of symptoms), and Evaluate primary symptoms of impaired attention and concentration that can occur in many neurological and psychiatric conditions. .    7. DAVID:    DAVID:  Discussed the general effects of drugs and alcohol on health and well-being. Provider gave patient printed information about the  effects of chemical use on their health and well being. Recommendations:  NA .     8. Records:   These were reviewed at time of assessment.   Information in this assessment was obtained from the medical record and  provided by  patient who is a good historian.    Patient will have open access to their mental health medical record.    9.   Interactive Complexity: No    10. Safety Plan:       Moy-Brown Safety Plan      Creation Date: 5/1/24       Step 1: Warning signs:    Warning Signs    depression, isolation, sadness, hopelessness      Step 2: Internal coping strategies - Things I can do to take my mind off my problems without contacting another person:    Strategies    listening to music, playing music, getting outside, playing video games      Step 3: People and social settings that provide distraction:    Name Contact Information    virtual friends     wife        Places    getting outside, [place]      Step 4: People whom I can ask for help during a crisis:    Name Contact Information    Wife       Step 5: Professionals or agencies I can contact during a crisis:    Clinician/Agency Name Phone Emergency Contact    TAYLOR Singleton        [facility] Emergency Department Emergency Department Address Emergency Department Phone    [facility] [address] [address] [phone]      Suicide Prevention Lifeline Phone: Call or Text 342  Crisis Text Line: Text HOME to 522723     Step 6: Making the environment safer (plan for lethal means safety):   Did not identify any lethal methods     Optional: What is most important to me and worth living for?:   Wife, children, family     Moy-Brown Safety Plan. Jacquelyn Winter and Levi Garcia. Used with permission of the authors.           Provider Name/ Credentials:  Annie Burris, PhD, LP  May 1, 2024

## 2024-05-03 ENCOUNTER — DOCUMENTATION ONLY (OUTPATIENT)
Dept: PSYCHOLOGY | Facility: CLINIC | Age: 39
End: 2024-05-03
Payer: COMMERCIAL

## 2024-05-03 NOTE — PROGRESS NOTES
Name:Prabhjot Coronel  MRN:? [id number]  :? 1985  ?   Client completed the [place] Multiphasic Personality Inventory-3 (MMPI-3), a self-report personality inventory, as part of his evaluation. Validity scales indicate that the client was able to comprehend and respond relevantly to the test items. Client responded in an open and consistent manner, resulting in a valid profile.  He reports diffuse cognitive difficulties including problems with memory, confusion and attention and concentration. His responses indicate significant emotional distress. He reports feeling sad and unhappy. He feels hopeless and pessimistic about the future. He does not cope well with stress. He has low self-esteem and feels incapable of dealing with life circumstances. He has difficulty concentrating and is worry prone and ruminative. He reports lacking confidence and feeling worthless and believes he is a burden to others. Client reports negative emotional experiences including anxiety, anger, and fear. He reports excessive worry, including worries about misfortune and finances as well as preoccupation with disappointments. Client reports engaging in compulsive behavior including repetitive checking. He reports an above average level of stress. He is prone to rumination and worries excessively. He reports disliking people and being around them, preferring to be alone. Client reports engaging in non-planful impulsive behavior.

## 2024-05-08 ENCOUNTER — VIRTUAL VISIT (OUTPATIENT)
Dept: PSYCHOLOGY | Facility: CLINIC | Age: 39
End: 2024-05-08
Payer: COMMERCIAL

## 2024-05-08 DIAGNOSIS — F33.0 MAJOR DEPRESSIVE DISORDER, RECURRENT EPISODE, MILD (H): Primary | ICD-10-CM

## 2024-05-08 DIAGNOSIS — F41.1 GAD (GENERALIZED ANXIETY DISORDER): ICD-10-CM

## 2024-05-08 PROCEDURE — 90832 PSYTX W PT 30 MINUTES: CPT | Mod: 95 | Performed by: PSYCHOLOGIST
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
## 2024-05-13 ENCOUNTER — DOCUMENTATION ONLY (OUTPATIENT)
Dept: PSYCHOLOGY | Facility: CLINIC | Age: 39
End: 2024-05-13
Payer: COMMERCIAL

## 2024-05-13 NOTE — PROGRESS NOTES
"Name: Morro Coronel   MRN:?[id number]  :?1985  ??   [place] Adult ADHD Rating Scale-IV: Self and Other Reports (BAARS-IV)?   The BAARS-IV assesses for symptoms of ADHD that are experienced in one's daily life. This assessment measure includes self and collateral rating scales designed to provide information regarding current and childhood symptoms of ADHD including inattention, hyperactivity, and impulsivity. Self-report scores are reported as percentiles. Scores at the 76th-83rd percentile are considered marginal, scores at the 84th-92nd percentile are considered borderline, scores at the 93rd-95th percentile are considered mild, scores at the 96th-98th percentile are considered moderate, and those at the 99th percentile are considered severe. Collateral or \"other\" rating scales are reported as number of symptoms observed in comparison to those reported by the client. Norms and percentile scores are not available for collateral reports.??   ??   Current Symptoms Scale--Self Report:??   Client completed the self-report inventory of current symptoms. The results indicate that the client's Total ADHD Score was 55 which places them in the 99th percentile for overall ADHD symptoms. In addition, the client endorsed the following occur \"often\" or \"very often\": 8/9 (98th percentile) Inattention symptoms, 3/9 (93rd?percentile) Hyperactivity/Impulsivity symptoms, and 6/9 (96th percentile) Sluggish Cognitive Tempo symptoms. Overall, the results suggest the client is reporting moderate symptoms of inattention and moderate symptoms of hyperactivity/impulsivity at this time.??   ??   Current Symptoms Scale--Other Report:?   Client's spouse completed the collateral report inventory of current symptoms. Based on the collateral contact's observation of symptoms, the client demonstrates the following \"often\" or \"very often\": 2/9 Inattention symptoms, 1/5 Hyperactivity symptoms, 0/4 Impulsivity symptoms, and 2/9 Sluggish " "Cognitive Tempo symptoms. The client's Total ADHD Score was 34. The collateral- and self-report scores are discrepant. She did not note symptoms of ADHD at this time.       Childhood Symptoms Scale--Self-Report:?   Client completed the self-report inventory of childhood symptoms. The results indicate that the client's Total ADHD Score was 52 which places them in the 97th percentile for overall ADHD symptoms in childhood. In addition, the client endorsed having experienced the following \"often\" or \"very often\": 9/9 (99th percentile) Inattention symptoms and 4/9 (89th percentile) Hyperactivity-Impulsivity symptoms. Overall, the results suggest the client experienced severe symptoms of inattention and borderline symptoms of hyperactivity/impulsivity in childhood. ?   ?   Childhood Symptoms Scale--Other Report:?   Client's mother completed the collateral report inventory of childhood symptoms. Based on the collateral contact's recollection of client's childhood symptoms, the client demonstrated the following \"often\" or \"very often\": 0/9 Inattention symptoms and 0/9 Hyperactivity-Impulsivity symptoms. The client's Total ADHD Score was 33. The collateral-report and self-report scores are discrepant. Client's father did not note symptoms of ADHD in childhood.      Client's father completed the collateral report inventory of childhood symptoms. Based on the collateral contact's recollection of client's childhood symptoms, the client demonstrated the following \"often\" or \"very often\": 1/9 Inattention symptoms and 0/9 Hyperactivity-Impulsivity symptoms. The client's Total ADHD Score was 22. The collateral-report and self-report scores are discrepant. Client's mother did not note symptoms of ADHD in childhood.    ?   [place] Functional Impairment Scale: Self and Other Reports (BFIS)?   The BFIS is used to assess an individuals' psychosocial impairment in major life/daily activities that may be due to a mental health disorder. " "This assessment measure includes self and collateral rating scales. Self-report scores are reported as percentiles. Scores at the 76th-83rd percentile are considered marginal, scores at the 84th-92nd percentile are considered borderline, scores at the 93rd-95th percentile are considered mild, scores at the 96th-98th percentile are considered moderate, and those at the 99th percentile are considered severe. Collateral or \"other\" rating scales are reported as number of symptoms observed in comparison to those reported by the client. Norms and percentile scores are not available for collateral reports.??   ??   Results indicate the client identified impairment (scores at or greater than 93rd percentile) in the following areas: home-family, home-chores, work, social-strangers, social-friends, dating/marriage, daily responsibilities, self-care routines, health maintenance, and childrearing. The client's Mean Impairment Score was 6.64 (97th percentile) indicating the client is reporting moderate impairment in functioning across domains. Client's spouse completed the collateral report which indicated discrepant results (e.g., Mean Impairment Score 1.85). She did not note impairment in any domains.   ?   [place] Deficits in Executive Functioning Scale (BDEFS)?   The BDEFS is a measure used for evaluating dimensions of adult executive functioning in daily life. This assessment measure includes self and collateral rating scales. Self-report scores are reported as percentiles. Scores at the 76th-83rd percentile are considered marginal, scores at the 84th-92nd percentile are considered borderline, scores at the 93rd-95th percentile are considered mild, scores at the 96th-98th percentile are considered moderate, and those at the 99th percentile are considered severe. Collateral or \"other\" rating scales are reported as number of symptoms observed in comparison to those reported by the client. Norms and percentile scores are not " available for collateral reports.??   ??   Results indicate the client's Total Executive Functioning Score was 236 (95th percentile). The ADHD-Executive Functioning Index score was 28 (96th percentile). These scores suggest the client is reporting mild to moderate deficits in executive functioning. Client noted the following deficit: self-management to time (severe); self-organization/problem-solving (mild); self-restraint (moderate) and self-motivation (mild). Client's spouse completed the collateral report with demonstrated discrepant results. Her scores were generally lower. She noted a deficit in the area of: self-management to time.      Generalized Anxiety Disorder Questionnaire (JEAN-PAUL-7)?   This questionnaire is designed to screen for anxiety in adults. Based on the client's score of 16, they are reporting severe symptoms of anxiety at this time. Client identified the following symptoms of anxiety: feeling nervous/anxious/on edge; not being able to stop or control worrying; worrying too much about different things; trouble relaxing; restlessness; becoming easily annoyed or irritable; and feeling afraid as if something awful might happen.       Patient Health Questionnaire- 9 (PHQ-9)??   This questionnaire is designed to screen for depression in adults. Based on the client's score of 17, they are reporting moderately-severe symptoms of depression at this time. Client identified the following symptoms of depression: little interest or pleasure in doing things; feeling down/depressed/hopeless; trouble falling asleep/staying asleep/sleeping too much; feeling tired or having little energy, poor appetite or overeating; poor concentration; and restlessness.

## 2024-05-15 ENCOUNTER — DOCUMENTATION ONLY (OUTPATIENT)
Dept: PSYCHOLOGY | Facility: CLINIC | Age: 39
End: 2024-05-15
Payer: COMMERCIAL

## 2024-05-15 NOTE — PROGRESS NOTES
CNS Vital Signs Neurocognitive Battery   The CNS Vital Signs Neurocognitive Battery is a remotely-administered assessment comprised of seven core subtests to individually measure the patient's verbal memory, visual memory, motor speed, psychomotor speed, reaction time, focus, ability to sustain attention and ability to adapt to changing rules and tasks.        Above average domain scores indicate a standard score (SS) greater than 109 or a Percentile Rank (MN) greater than 74, indicating a high functioning test subject. Average is a SS  or MN 25-74, indicating normal function. Low Average is a SS 80-89 or MN 9-24 indicating a slight deficit or impairment. Below Average is a SS 70-79 or MN 2-8, indicating a moderate level of deficit or impairment. Very Low is a SS less than 70 or a MN less than 2, indicating a deficit and impairment.  Validity Indicator denotes a guideline for representing the possibility of an invalid test or domain score, and can be influenced by patient understanding, effort, or other conditions.     The patient's results are detailed below:      Domain  Standard Score  Percentile  Description  Validity    Neurocognitive Index  95  37 Average Y   Composite?Memory?Measure  111 77 Above Average Y   Verbal Memory  96 40 Average Y   Visual?Memory  123 94 Above Average Y   Psychomotor Speed  114 82 Above Average Y   Reaction Time  89 23 Low Average Y   Complex Attention  83 13 Low Average Y   Cognitive Flexibility  79 8 Low Y   Processing Speed  101 53 Average Y   Executive Function  78 7 Low Y   Working Memory  118 88 Above Average Y   Sustained Attention  116 86 Above Average Y   Simple Attention  91 27 Average Y   Motor Speed  116 86 Above Average Y      Neurocognitive?Index?(NCI): Measures an average score derived from the domain scores or a general assessment of the overall neurocognitive status of the patient. The patient's NCI score is 85, with a percentile of 37, and falls within the  Average range.      Composite?Memory: Measures how well subject can recognize, remember, and retrieve words and geometric figures, and is comprised of the Visual and Verbal Memory domains. The patient's Composite Memory score is 111, with a percentile of 77, and falls within the Above Average range.      Verbal?Memory: Measures how well subject can recognize, remember, and retrieve words. The patient's Verbal Memory score is 96, with a percentile of 40, and falls within the Average range.      Visual?Memory: Measures how well subject can recognize, remember and retrieve geometric figures. The patient's Visual Memory score is 123, with a percentile of 94, and falls within the Above Average range.      Psychomotor?Speed: Measures how well a subject perceives, attends, responds to complex visual-perceptual information and performs simple fine motor coordination, and is comprised of the Motor Speed and Processing Speed indexes. The patient's Psychomotor Speed score is 114, with a percentile of 82, and falls within the Above Average range.      Reaction?Time: Measures how quickly the subject can react, in milliseconds, to a simple and increasingly complex direction set. The patient's Reaction Time score is 89, with a percentile of 23, and falls within the Low Average range.      Complex?Attention: Measures the ability to track and respond to a variety of stimuli over lengthy periods of time and/or perform complex mental tasks requiring vigilance quickly and accurately. The patient's Complex Attention score is 83, with a percentile of 13, and falls within the Low Average range.      Cognitive?Flexibility: Measures how well subject is able to adapt to rapidly changing and increasingly complex set of directions and/or to manipulate the information. The patient's Cognitive Flexibility score is 79, with a percentile of 8, and falls within the Low range.      Processing?Speed: Measures how well a subject recognizes and  processes information i.e., perceiving, attending/responding to incoming information, motor speed, fine motor coordination, and visual-perceptual ability. The patient's Processing Speed score is 101, with a percentile of 53, and falls within the Average range.      Executive?Function: Measures how well a subject recognizes rules, categories, and manages or navigates rapid decision making. The patient's Executive Function score is 78, with a percentile of 7, and falls within the Low range.      Simple?Attention: Measures the ability to track and respond to a single defined stimulus over lengthy periods of time while performing vigilance and response inhibition quickly and accurately to a simple task. The patient's Simple Attention score is 91, with a percentile of 27, and falls within the Average range.      Motor?Speed: Measure: Ability to perform simple movements to produce and satisfy an intention towards a manual action and goal. The patient's Motor Speed score is 116, with a percentile of 86, and falls within the Above Average range.

## 2024-05-20 ENCOUNTER — DOCUMENTATION ONLY (OUTPATIENT)
Dept: PSYCHOLOGY | Facility: CLINIC | Age: 39
End: 2024-05-20
Payer: COMMERCIAL

## 2024-05-20 NOTE — PROGRESS NOTES
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 identify the following learning problems: attention and reading comprehension. He couldn't focus while reading. He would re-read the same paragraph over and over again and this was frustrating. Client did not receive tutoring services during the school years. Client did not receive special education services. Client reported failure to finish or complete homework. Client had difficult getting started on homework. He didn't know how to do the assignments and he would procrastinate on assignments. He turned assignments in late or didn't complete them at all. He did not have attendance issues. During senior year, he was often rushing from home in the morning to get to the first class (sometimes he was late for the first class of the day). He was not disruptive in class. He was tired in class and sometimes would put his head down on his desk. He would daydream at times. He found it difficult to take notes. Client had difficulty studying for tests. He did a bit better on writing papers, but he would wait until the last minute to get started on them. Client did attend post-secondary school. He graduated from college in 2008 from [employer] with a degree in geography. He did a lot better in college. He obtained mostly As and Bs in college. He does not think that his time management improved, but he was able to finish assignments and turn them  in and this improved his grades (versus turning things in late in high school). He was more interested in the subject matter as well and this was more motivating. He did not have attendance issues. He would procrastinate and work on papers the night before they were due. In his last two years of college he was also working.      Client reported that he is currently employed full-time. 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 There are quarterly deadlines throughout the year. He has been doing this for 11.5 years. Client reported that the current job is a good fit for his skills and personality. Client reported that he been frequently late for work, frequently made mistakes with poor attention to detail, often felt bored, often been late in completing projects, disorganized behavior, and distractible behavior. Client explained that he makes mistakes because he is rushing to get things done. He will make errors on . He has used the incorrect payment information for a Client. He will forget altogether that someone sent him something to do and then they have to follow up with Client. He has difficulty getting things done and meeting deadlines. He feels overwhelmed and there is a lot of work to get done. He thinks he should have enough time to do it, but it is too difficult to get started. He doesn't know where to start. He is disorganized and will bounce back and forth between tasks without finishing things. He gets distracted by other work-related tasks or things that come up that pique his interest. He will lose his focus. His  will interrupt him and then it is had to get back on track. The client's work history includes: [employer] (staff,  - there for 5 years);  at the [employer]. The longest period of employment has been 11.5 years. Client has not  been terminated from a place of employment.       Results of testing were indicative of ADHD. Client is reporting symptoms of inattention and hyperactivity/impulsivity that have been present since childhood. However, the collateral reports (spouse and mother and father) were discrepant. His wife noted fewer ADHD symptoms at this time. His parents did not note significant symptoms of ADHD in childhood. Client's report during the clinical interview was suggestive of such difficulties during that time (e.g., inattention, procrastination and poor task completion). Client is endorsing mild to moderate deficits in executive functioning and moderate impairment in functioning. indeed, Client described struggling in a number of different domains (i.e., home, work, school). Personality testing was positive for problems with attention and concentration, anxiety and stress and impulsivity. Self-report measures indicate Client is experiencing severe anxiety and moderately-severe depression at this time. Client completed a brief virtual assessment examining brief core brain function domains. Results of this assessment demonstrated that, overall, Client's scores fell between the Average and Above Average ranges. Client performed in the Low Average range in the area of Complex Attention. He performed in the Low range on the indices of Cognitive Flexibility and Executive Function. Client demonstrated difficulty on a test measuring how well a person recognizes set shifting (mental flexibility) and abstraction and manages multiple tasks simultaneously, subjects have to adjust their responses to randomly changing rules. Errors may be due to impulsive responding. Prolonged reaction time indicate cognitive slowing/impairment.    Based on the results of clinical interview and psychological testing, Client is assigned a diagnosis of ADHD Combined Presentation. Client also meets criteria for a diagnosis of Major Depressive Disorder,  Recurrent, Mild, and Generalized Anxiety Disorder. Client will be provided with the results of testing, diagnosis, and recommendations in his last appointment.      DSM5 Diagnoses: (Sustained by DSM5 Criteria Listed Above)     ADHD Combined Presentation (F90.2)      Major Depressive Disorder, Recurrent, Mild (F33.0)    Generalized Anxiety Disorder (F41.1)     Psychosocial & Contextual Factors: work stress      Recommendations:      1. Schedule a medication evaluation with your physician. Medications are often beneficial in treating anxiety and depression symptoms as well as ADHD. It will be important that each condition is treated, as treatment for one without the other may lead to an increase in symptoms (e.g., treating ADHD, but not anxiety, can lead to increased anxiety).??   ???   2. Access resources through websites, books, and articles such as those provided in the Adult ADHD Symptom Management handout. ???   ???   3. Consider working with an ADHD  or individual therapist to learn skills to?assist with symptom management, as well as ways to improve relationships, etc. that may have been impacted by your symptoms.??   ???   4. Individual therapy is recommended. Therapies focused on identifying and challenging problematic thought and behavior patterns while increasing the use of healthy coping skills has been found to be effective in treating depression and anxiety. It will be important to set goals in this therapy and work actively toward achieving short-term successes that lead to the completion of each goal. Action-oriented therapies, such as CBT and ACT (Acceptance and Commitment Therapy) are particularly recommended for the treatment of chronic depression and anxiety.    ?? ??     5. ?The following compensatory strategies may be useful to cope with reported inattention symptoms:    a. Maintaining a predictable routine and structured environment that incorporates prioritized checklists and reminders  (e.g., Post-Its).   b. When completing tasks, try to focus on one task at a time and complete it in its entirety before moving on to the next task.   c. Minimize background distractions when working on complex tasks. For example, TV, radio or ongoing conversations in the background may hinder ability to focus on the task at hand.   d. Take regular breaks from tasks that require prolonged attention. In general, regular breaks from complex tasks can help prevent lapses in attention, which can result in errors.   e. Outline the steps required to complete a task prior to beginning it, which can help ensure an organized approach. Use the outline to refer to throughout the task as a reminder of the steps to be completed.      Annie Burris, PhD, LP   Licensed Psychologist

## 2024-05-23 ENCOUNTER — VIRTUAL VISIT (OUTPATIENT)
Dept: PSYCHOLOGY | Facility: CLINIC | Age: 39
End: 2024-05-23
Payer: COMMERCIAL

## 2024-05-23 DIAGNOSIS — F33.0 MAJOR DEPRESSIVE DISORDER, RECURRENT EPISODE, MILD (H): Primary | ICD-10-CM

## 2024-05-23 DIAGNOSIS — F90.2 ATTENTION DEFICIT HYPERACTIVITY DISORDER, COMBINED TYPE: ICD-10-CM

## 2024-05-23 DIAGNOSIS — F41.1 GAD (GENERALIZED ANXIETY DISORDER): ICD-10-CM

## 2024-05-23 PROCEDURE — 96131 PSYCL TST EVAL PHYS/QHP EA: CPT | Mod: 95 | Performed by: PSYCHOLOGIST

## 2024-05-23 PROCEDURE — 96130 PSYCL TST EVAL PHYS/QHP 1ST: CPT | Mod: 95 | Performed by: PSYCHOLOGIST

## 2024-05-23 NOTE — PROGRESS NOTES
Client Name:  Morro Coronel  Date: 5/23/24?   ?   Service Type: Individual (ADHD Evaluation feedback session)?   ??   Session Start Time: 12:14 Session End Time: 12:34 ??   ??   Session Length: 20 minutes ?   ??   Session #: (feedback)?   ??   Attendees: Client attended alone?      Telemedicine Visit: The patient's condition can be safely assessed and treated via synchronous audio and visual telemedicine encounter.      Reason for Telemedicine Visit: Services only offered telehealth    Originating Site (Patient Location): Patient's home      Distant Location (provider location):  Off-site    Consent:  The patient/guardian has verbally consented to: the potential risks and benefits of telemedicine (video visit) versus in person care; bill my insurance or make self-payment for services provided; and responsibility for payment of non-covered services.     Mode of Communication:  Video Conference via [facility]    As the provider I attest to compliance with applicable laws and regulations related to telemedicine.    ??   DATA?   ??   ??   Treatment Objective(s) Addressed in This Session:??   Provided feedback on ADHD evaluation. Reviewed test results in depth and answered client's questions. Client diagnosed with ADHD Combined Presentation, Major Depressive Disorder, Recurrent, Mild, and Generalized Anxiety Disorder. Reviewed ADHD Coping Skills Handout. This provider also completed full written report of evaluation, including integration of testing data, summary, and recommendations. Please see Documentation Only dated 5/20/24.?   ??   Progress on / Status of Treatment Objective(s) / Homework:??   Completed??   ??   Intervention:?   ADHD Evaluation feedback; Reviewed report (can be found in Documentation Only encounter dated 5/20/24); Client was appreciative of the feedback and expressed understanding of the diagnoses.?   ?   ??   ASSESSMENT: Current Emotional / Mental Status (status of significant symptoms):?    Risk status (Self / Other harm or suicidal ideation)?   Client denies current fears or concerns for personal safety.?   Client denies current or recent suicidal ideation or behaviors.?   Client denies current or recent homicidal ideation or behaviors.?   Client denies current or recent self-injurious behavior or ideation.?   Client denies other safety concerns.?   A safety and risk management plan has not been developed at this time, however client was given the after-hours number / 911 should there be a change in any of these risk factors.?   ??   Appearance: Appropriate   Eye Contact: Good?   Psychomotor Behavior: Normal?   Attitude: Cooperative??   Orientation: All?   Speech?   Rate / Production: Normal??   Volume: Normal??   Mood: Normal?   Affect: Appropriate??   Thought Content: Clear??   Thought Form: Coherent Logical??   Insight: Good??   ??   Medication Review:?   Client is prescribed Zoloft by PCP.   ?   Medication Compliance:?   Yes   ??   Changes in Health Issues:?   None reported?   ??   Chemical Use Review:?   Substance Use: Chemical use reviewed, no active concerns identified   ??   Tobacco Use: No current tobacco use.??   ??   Collateral Reports Completed:?   Routed note to Care Team Member(s)?   ??   PLAN: (Homework, other)?   ??   Recommendations:?   ?   1. Schedule a medication evaluation with your physician. Medications are often beneficial in treating anxiety and depression symptoms as well as ADHD. It will be important that each condition is treated, as treatment for one without the other may lead to an increase in symptoms (e.g., treating ADHD, but not anxiety, can lead to increased anxiety).??   ???   2. Access resources through websites, books, and articles such as those provided in the Adult ADHD Symptom Management handout. ???   ???   3. Consider working with an ADHD  or individual therapist to learn skills to?assist with symptom management, as well as ways to improve relationships,  etc. that may have been impacted by your symptoms.??   ???   4. Individual therapy is recommended. Therapies focused on identifying and challenging problematic thought and behavior patterns while increasing the use of healthy coping skills has been found to be effective in treating depression and anxiety. It will be important to set goals in this therapy and work actively toward achieving short-term successes that lead to the completion of each goal. Action-oriented therapies, such as CBT and ACT (Acceptance and Commitment Therapy) are particularly recommended for the treatment of chronic depression and anxiety.    ?? ??     5. ?The following compensatory strategies may be useful to cope with reported inattention symptoms:    a. Maintaining a predictable routine and structured environment that incorporates prioritized checklists and reminders (e.g., Post-Its).   b. When completing tasks, try to focus on one task at a time and complete it in its entirety before moving on to the next task.   c. Minimize background distractions when working on complex tasks. For example, TV, radio or ongoing conversations in the background may hinder ability to focus on the task at hand.   d. Take regular breaks from tasks that require prolonged attention. In general, regular breaks from complex tasks can help prevent lapses in attention, which can result in errors.   e. Outline the steps required to complete a task prior to beginning it, which can help ensure an organized approach. Use the outline to refer to throughout the task as a reminder of the steps to be completed.      Annie Burris, PhD,    Licensed Psychologist          Psychological Testing    Billing/Services Summary    Clinical Interview/Assessment  CPT  Code 21067   (Untimed)    Interview (Face-to-Face)   (Start/Stop), (Date, Day #)  XX minutes    Documentation (Non-Face-to-Face)   (Start/Stop), (Date, Day #)  XX minutes    Total Time:  XX minutes (X hours, XX  minutes)    Total Units:  1    ?    Testing Evaluation Services  Base: 81124   (1st 60 mins)  Add-on: 70151   (each addtl 60 mins)    Record Review and Clarify Referral Question    (9:20/9:40), (5/1/24)  20 minutes    Integration/Report Generation    (12:00/1:00), (5/3/24) - MMPI-3  (11:00/12:00), (5/13/24) - Libertad Scales  (12:00/1:00), (5/15/24) - CNS Vital Signs  (3:00/4:00), (5/20/24) - Report Writing  60 minutes   60 minutes   60 minutes   60 minutes    Interactive Feedback Session   (12:14/12:34), (5/23/24)  20 minutes    Total Time:  280 minutes (4 hours, 40 minutes)    Total Units:  1  4    ?    ?    Diagnosis(es): (ICD-10)   ADHD Combined Presentation (F90.2)   Major Depressive Disorder, Recurrent, Mild (F33.0)  Generalized Anxiety Disorder (F41.1)

## 2024-06-18 ENCOUNTER — OFFICE VISIT (OUTPATIENT)
Dept: FAMILY MEDICINE | Facility: CLINIC | Age: 39
End: 2024-06-18
Payer: COMMERCIAL

## 2024-06-18 VITALS
TEMPERATURE: 98.7 F | DIASTOLIC BLOOD PRESSURE: 82 MMHG | HEIGHT: 75 IN | HEART RATE: 70 BPM | RESPIRATION RATE: 20 BRPM | OXYGEN SATURATION: 98 % | WEIGHT: 197.4 LBS | BODY MASS INDEX: 24.54 KG/M2 | SYSTOLIC BLOOD PRESSURE: 126 MMHG

## 2024-06-18 DIAGNOSIS — F90.0 ATTENTION DEFICIT HYPERACTIVITY DISORDER (ADHD), PREDOMINANTLY INATTENTIVE TYPE: Primary | ICD-10-CM

## 2024-06-18 DIAGNOSIS — F41.1 GAD (GENERALIZED ANXIETY DISORDER): ICD-10-CM

## 2024-06-18 DIAGNOSIS — Z23 ENCOUNTER FOR IMMUNIZATION: ICD-10-CM

## 2024-06-18 DIAGNOSIS — F33.1 MODERATE EPISODE OF RECURRENT MAJOR DEPRESSIVE DISORDER (H): ICD-10-CM

## 2024-06-18 DIAGNOSIS — Z78.9 ALCOHOL USE: ICD-10-CM

## 2024-06-18 PROBLEM — F90.9 ADHD (ATTENTION DEFICIT HYPERACTIVITY DISORDER): Status: ACTIVE | Noted: 2024-06-18

## 2024-06-18 LAB
AMPHETAMINES UR QL SCN: NORMAL
BARBITURATES UR QL SCN: NORMAL
BENZODIAZ UR QL SCN: NORMAL
BZE UR QL SCN: NORMAL
CANNABINOIDS UR QL SCN: NORMAL
FENTANYL UR QL: NORMAL
OPIATES UR QL SCN: NORMAL
PCP QUAL URINE (ROCHE): NORMAL

## 2024-06-18 PROCEDURE — 96127 BRIEF EMOTIONAL/BEHAV ASSMT: CPT | Performed by: STUDENT IN AN ORGANIZED HEALTH CARE EDUCATION/TRAINING PROGRAM

## 2024-06-18 PROCEDURE — 90471 IMMUNIZATION ADMIN: CPT | Performed by: STUDENT IN AN ORGANIZED HEALTH CARE EDUCATION/TRAINING PROGRAM

## 2024-06-18 PROCEDURE — 99214 OFFICE O/P EST MOD 30 MIN: CPT | Mod: 25 | Performed by: STUDENT IN AN ORGANIZED HEALTH CARE EDUCATION/TRAINING PROGRAM

## 2024-06-18 PROCEDURE — 80307 DRUG TEST PRSMV CHEM ANLYZR: CPT | Performed by: STUDENT IN AN ORGANIZED HEALTH CARE EDUCATION/TRAINING PROGRAM

## 2024-06-18 PROCEDURE — 90746 HEPB VACCINE 3 DOSE ADULT IM: CPT | Performed by: STUDENT IN AN ORGANIZED HEALTH CARE EDUCATION/TRAINING PROGRAM

## 2024-06-18 ASSESSMENT — ANXIETY QUESTIONNAIRES
3. WORRYING TOO MUCH ABOUT DIFFERENT THINGS: NOT AT ALL
8. IF YOU CHECKED OFF ANY PROBLEMS, HOW DIFFICULT HAVE THESE MADE IT FOR YOU TO DO YOUR WORK, TAKE CARE OF THINGS AT HOME, OR GET ALONG WITH OTHER PEOPLE?: NOT DIFFICULT AT ALL
7. FEELING AFRAID AS IF SOMETHING AWFUL MIGHT HAPPEN: NOT AT ALL
IF YOU CHECKED OFF ANY PROBLEMS ON THIS QUESTIONNAIRE, HOW DIFFICULT HAVE THESE PROBLEMS MADE IT FOR YOU TO DO YOUR WORK, TAKE CARE OF THINGS AT HOME, OR GET ALONG WITH OTHER PEOPLE: NOT DIFFICULT AT ALL
GAD7 TOTAL SCORE: 4
1. FEELING NERVOUS, ANXIOUS, OR ON EDGE: SEVERAL DAYS
6. BECOMING EASILY ANNOYED OR IRRITABLE: SEVERAL DAYS
2. NOT BEING ABLE TO STOP OR CONTROL WORRYING: NOT AT ALL
GAD7 TOTAL SCORE: 4
GAD7 TOTAL SCORE: 4
5. BEING SO RESTLESS THAT IT IS HARD TO SIT STILL: SEVERAL DAYS
7. FEELING AFRAID AS IF SOMETHING AWFUL MIGHT HAPPEN: NOT AT ALL
4. TROUBLE RELAXING: SEVERAL DAYS

## 2024-06-18 ASSESSMENT — PATIENT HEALTH QUESTIONNAIRE - PHQ9
SUM OF ALL RESPONSES TO PHQ QUESTIONS 1-9: 4
10. IF YOU CHECKED OFF ANY PROBLEMS, HOW DIFFICULT HAVE THESE PROBLEMS MADE IT FOR YOU TO DO YOUR WORK, TAKE CARE OF THINGS AT HOME, OR GET ALONG WITH OTHER PEOPLE: NOT DIFFICULT AT ALL
SUM OF ALL RESPONSES TO PHQ QUESTIONS 1-9: 4

## 2024-06-18 NOTE — PROGRESS NOTES
"  Assessment & Plan        Diagnosis Comments   1. Attention deficit hyperactivity disorder (ADHD), predominantly inattentive type  Urine Drug Screen       2. Moderate episode of recurrent major depressive disorder (H)  AL BEHAV ASSMT W/SCORE & DOCD/STAND INSTRUMENT       3. JEAN-PAUL (generalized anxiety disorder)  AL BEHAV ASSMT W/SCORE & DOCD/STAND INSTRUMENT       4. Encounter for immunization  HEPATITIS B, ADULT 20+ (ENGERIX-B/RECOMBIVAX HB)       5. Alcohol use            ADHD:  Underwent neuropsychiatric testing and diagnosed with ADHD 5/23/2024  Patient is here today  Discuss potential medications for ADHD  Discussed that stimulants are generally first-line for ADHD.  Stimulants can potentially come with tachycardia, worsened anxiety, insomnia, unintentional weight loss/appetite suppressant.  Because stimulants are controlled substances, I generally dispensed them on a monthly basis only.  No early refills are provided.   Patient verbalized understanding about the side effect profile and is amenable to trialing stimulants  Comorbidities: No polysubstance abuse but patient does drink regularly.  Patient will no longer be using THC Gummies.   Has no pre-existing cardiac conditions  UDS and CSA ordered  Will dispense Adderall 5 mg XR once UDS results.  Will follow-up in 1 month    MDD/JEAN-PAUL  ETOH use   PHQ-9 score is 4   JEAN-PAUL-7 score is 4  No active SI/HI  Last time patient was here, had been doing well with therapy  Had wanted to decrease his Zoloft dose because it made him feel like he was on autopilot and unable to ask for some of his emotions.  Dose was decreased to 150 mg.  Today, patient notes that coming down on the dose was difficult.  [place] like his emotions were \"a lot more present\" and his anxiety levels were also high.  He felt like he wanted to drink more.  He was using THC Gummies more often and felt like he was more inattentive and making \"some mistakes\" at work.  He did take a THC gummy 1 evening when his " "anxiety was high and he had \"a bad trip\"  He has been working with his therapist and feels like his emotions have now leveled off  He would like to hold on changing his Zoloft dosing for now and focus on his ADHD symptoms.  I think that is reasonable.  Will touch base about anxiety/depression the next time I see him.    Encounter for immunization:  Needs second hepatitis B vaccine dose.  Ordered          4/19/2024     3:40 PM 5/1/2024     9:23 AM 6/18/2024     7:44 AM   PHQ   PHQ-9 Total Score 6 6 4   Q9: Thoughts of better off dead/self-harm past 2 weeks Not at all Not at all Not at all           11/28/2023     9:42 AM 4/19/2024     3:40 PM 6/18/2024     7:45 AM   JEAN-PAUL-7 SCORE   Total Score 0 (minimal anxiety) 6 (mild anxiety) 4 (minimal anxiety)   Total Score 0 6 4     The longitudinal plan of care for the diagnosis(es)/condition(s) as documented were addressed during this visit. Due to the added complexity in care, I will continue to support Morro in the subsequent management and with ongoing continuity of care.          Yony Hooker is a 39 year old, presenting for the following health issues:  Follow Up (on mood and getting hep b vaccine. Pt want to talk about recent ADHD see mind chart.) and Recheck Medication        6/18/2024    10:46 AM   Additional Questions   Roomed by      HPI         Review of Systems  As per HPI       Objective    /82   Pulse 70   Temp 98.7  F (37.1  C) (Oral)   Resp 20   Ht 1.892 m (6' 2.5\")   Wt 89.5 kg (197 lb 6.4 oz)   SpO2 98%   BMI 25.01 kg/m    Body mass index is 25.01 kg/m .  Physical Exam   GENERAL: alert and no distress  NECK: no adenopathy, no asymmetry, masses, or scars  RESP: lungs clear to auscultation - no rales, rhonchi or wheezes  CV: regular rate and rhythm, , no murmur, click or rub, no peripheral edema  ABDOMEN: soft, nontender, no hepatosplenomegaly, no masses and bowel sounds normal  MS: no gross musculoskeletal defects noted, no edema  PSYCH: " mentation appears normal, affect anxious         Signed Electronically by: Yudith James, DO

## 2024-06-18 NOTE — LETTER
[facility]  06/18/24  Patient: Morro Coronel  YOB: 1985  Medical Record Number: [id number]                                                                                  Non-Opioid Controlled Substance Agreement    This is an agreement between you and your provider regarding safe and appropriate use of controlled substances prescribed by your care team. Controlled substances are?medicines that can cause physical and mental dependence (abuse).     There are strict laws about having and using these medicines. We here at [facility] are  committed to working with you in your efforts to get better. To support you in this work, we'll help you schedule regular office appointments for medicine refills. If we must cancel or change your appointment for any reason, we'll make sure you have enough medicine to last until your next appointment.     As a Provider, I will:   Listen carefully to your concerns while treating you with respect.   Recommend a treatment plan that I believe is in your best interest and may involve therapies other than medicine.    Talk with you often about the possible benefits and the risk of harm of any medicine that we prescribe for you.  Assess the safety of this medicine and check how well it works.    Provide a plan on how to taper (discontinue or go off) using this medicine if the decision is made to stop its use.      ::  As a Patient, I understand controlled substances:     Are prescribed by my care provider to help me function or work and manage my condition(s).?  Are strong medicines and can cause serious side effects.     Need to be taken exactly as prescribed.?Combining controlled substances with certain medicines or chemicals (such as illegal drugs, alcohol, sedatives, sleeping pills, and benzodiazepines) can be dangerous or even fatal.? If I stop taking my medicines suddenly, I may have severe withdrawal symptoms.     The risks,  benefits, and side effects of these medicine(s) were explained to me. I agree that:    I will take part in other treatments as advised by my care team. This may be psychiatry or counseling, physical therapy, behavioral therapy, group treatment or a referral to specialist.    I will keep all my appointments and understand this is part of the monitoring of controlled substances.?My care team may require an office visit for EVERY controlled substance refill. If I miss appointments or don t follow instructions, my care team may stop my medicine    I will take my medicines as prescribed. I will not change the dose or schedule unless my care team tells me to. There will be no refills if I run out early.      I may be asked to come to the clinic and complete a urine drug test or complete a pill count. If I don t give a urine sample or participate in a pill count, the care team may stop my medicine.    I will only receive controlled substance prescriptions from this clinic. If I am treated by another provider, I will tell them that I am taking controlled substances and that I have a treatment agreement with this provider. I will inform my [facility] care team within one business day if I am given a prescription for any controlled substance by another healthcare provider. My [facility] care team can contact other providers and pharmacists about my use of any medicines.    It is up to me to make sure that I don't run out of my medicines on weekends or holidays.?If my care team is willing to refill my prescription without a visit, I must request refills only during office hours. Refills may take up to 3 business days to process. I will use one pharmacy to fill all my controlled substance prescriptions. I will notify the clinic about any changes to my insurance or medicine availability.    I am responsible for my prescriptions. If the medicine/prescription is lost, stolen or destroyed, it will not be replaced.?I  also agree not to share controlled substance medicines with anyone.     I am aware I should not use any illegal or recreational drugs. I agree not to drink alcohol unless my care team says I can.     If I enroll in the [place] [facility] program, I will tell my care team before my next refill.    I will tell my care team right away if I become pregnant, have a new medical problem treated outside of my regular clinic, or have a change in my medicines.     I understand that this medicine can affect my thinking, judgment and reaction time.? Alcohol and drugs affect the brain and body, which can affect the safety of my driving. Being under the influence of alcohol or drugs can affect my decision-making, behaviors, personal safety and the safety of others. Driving while impaired (DWI) can occur if a person is driving, operating or in physical control of a car, motorcycle, boat, snowmobile, ATV, motorbike, off-road vehicle or any other motor vehicle ([place] Statute 169[place].20). I understand the risk if I choose to drive or operate any vehicle or machinery.    I understand that if I do not follow any of the conditions above, my prescriptions or treatment may be stopped or changed.   I agree that my provider, clinic care team and pharmacy may work with any city, state or federal law enforcement agency that investigates the misuse, sale or other diversion of my controlled medicine. I will allow my provider to discuss my care with, or share a copy of, this agreement with any other treating provider, pharmacy or emergency room where I receive care.     I have read this agreement and have asked questions about anything I did not understand.    ________________________________________________________  Patient Signature - Morro Coronel     ___________________                   Date     ________________________________________________________  Provider Signature - Yudith James, DO       ___________________                    Date     ________________________________________________________  Witness Signature (required if provider not present while patient signing)          ___________________                   Date

## 2024-06-18 NOTE — LETTER
[facility]  06/18/24  Patient: Morro Coronel  YOB: 1985  Medical Record Number: [id number]                                                                                  Non-Opioid Controlled Substance Agreement    This is an agreement between you and your provider regarding safe and appropriate use of controlled substances prescribed by your care team. Controlled substances are?medicines that can cause physical and mental dependence (abuse).     There are strict laws about having and using these medicines. We here at [facility] are  committed to working with you in your efforts to get better. To support you in this work, we'll help you schedule regular office appointments for medicine refills. If we must cancel or change your appointment for any reason, we'll make sure you have enough medicine to last until your next appointment.     As a Provider, I will:   Listen carefully to your concerns while treating you with respect.   Recommend a treatment plan that I believe is in your best interest and may involve therapies other than medicine.    Talk with you often about the possible benefits and the risk of harm of any medicine that we prescribe for you.  Assess the safety of this medicine and check how well it works.    Provide a plan on how to taper (discontinue or go off) using this medicine if the decision is made to stop its use.      ::  As a Patient, I understand controlled substances:     Are prescribed by my care provider to help me function or work and manage my condition(s).?  Are strong medicines and can cause serious side effects.     Need to be taken exactly as prescribed.?Combining controlled substances with certain medicines or chemicals (such as illegal drugs, alcohol, sedatives, sleeping pills, and benzodiazepines) can be dangerous or even fatal.? If I stop taking my medicines suddenly, I may have severe withdrawal symptoms.     The risks,  benefits, and side effects of these medicine(s) were explained to me. I agree that:    I will take part in other treatments as advised by my care team. This may be psychiatry or counseling, physical therapy, behavioral therapy, group treatment or a referral to specialist.    I will keep all my appointments and understand this is part of the monitoring of controlled substances.?My care team may require an office visit for EVERY controlled substance refill. If I miss appointments or don t follow instructions, my care team may stop my medicine    I will take my medicines as prescribed. I will not change the dose or schedule unless my care team tells me to. There will be no refills if I run out early.      I may be asked to come to the clinic and complete a urine drug test or complete a pill count. If I don t give a urine sample or participate in a pill count, the care team may stop my medicine.    I will only receive controlled substance prescriptions from this clinic. If I am treated by another provider, I will tell them that I am taking controlled substances and that I have a treatment agreement with this provider. I will inform my [facility] care team within one business day if I am given a prescription for any controlled substance by another healthcare provider. My [facility] care team can contact other providers and pharmacists about my use of any medicines.    It is up to me to make sure that I don't run out of my medicines on weekends or holidays.?If my care team is willing to refill my prescription without a visit, I must request refills only during office hours. Refills may take up to 3 business days to process. I will use one pharmacy to fill all my controlled substance prescriptions. I will notify the clinic about any changes to my insurance or medicine availability.    I am responsible for my prescriptions. If the medicine/prescription is lost, stolen or destroyed, it will not be replaced.?I  also agree not to share controlled substance medicines with anyone.     I am aware I should not use any illegal or recreational drugs. I agree not to drink alcohol unless my care team says I can.     If I enroll in the [place] [facility] program, I will tell my care team before my next refill.    I will tell my care team right away if I become pregnant, have a new medical problem treated outside of my regular clinic, or have a change in my medicines.     I understand that this medicine can affect my thinking, judgment and reaction time.? Alcohol and drugs affect the brain and body, which can affect the safety of my driving. Being under the influence of alcohol or drugs can affect my decision-making, behaviors, personal safety and the safety of others. Driving while impaired (DWI) can occur if a person is driving, operating or in physical control of a car, motorcycle, boat, snowmobile, ATV, motorbike, off-road vehicle or any other motor vehicle ([place]ute 169A.20). I understand the risk if I choose to drive or operate any vehicle or machinery.    I understand that if I do not follow any of the conditions above, my prescriptions or treatment may be stopped or changed.   I agree that my provider, clinic care team and pharmacy may work with any city, state or federal law enforcement agency that investigates the misuse, sale or other diversion of my controlled medicine. I will allow my provider to discuss my care with, or share a copy of, this agreement with any other treating provider, pharmacy or emergency room where I receive care.     I have read this agreement and have asked questions about anything I did not understand.    ________________________________________________________  Patient Signature - Morro Coronel     ___________________                   Date     ________________________________________________________  Provider Signature - Yudith James, DO       ___________________                    Date     ________________________________________________________  Witness Signature (required if provider not present while patient signing)          ___________________                   Date

## 2024-06-20 DIAGNOSIS — F90.0 ATTENTION DEFICIT HYPERACTIVITY DISORDER (ADHD), PREDOMINANTLY INATTENTIVE TYPE: Primary | ICD-10-CM

## 2024-06-20 RX ORDER — DEXTROAMPHETAMINE SACCHARATE, AMPHETAMINE ASPARTATE MONOHYDRATE, DEXTROAMPHETAMINE SULFATE AND AMPHETAMINE SULFATE 1.25; 1.25; 1.25; 1.25 MG/1; MG/1; MG/1; MG/1
5 CAPSULE, EXTENDED RELEASE ORAL DAILY
Qty: 30 CAPSULE | Refills: 0 | Status: SHIPPED | OUTPATIENT
Start: 2024-06-20 | End: 2024-07-23

## 2024-07-23 ENCOUNTER — OFFICE VISIT (OUTPATIENT)
Dept: FAMILY MEDICINE | Facility: CLINIC | Age: 39
End: 2024-07-23
Payer: COMMERCIAL

## 2024-07-23 VITALS
OXYGEN SATURATION: 97 % | SYSTOLIC BLOOD PRESSURE: 108 MMHG | WEIGHT: 193.1 LBS | HEART RATE: 73 BPM | RESPIRATION RATE: 16 BRPM | BODY MASS INDEX: 24.78 KG/M2 | DIASTOLIC BLOOD PRESSURE: 60 MMHG | TEMPERATURE: 99 F | HEIGHT: 74 IN

## 2024-07-23 DIAGNOSIS — F33.1 MODERATE EPISODE OF RECURRENT MAJOR DEPRESSIVE DISORDER (H): ICD-10-CM

## 2024-07-23 DIAGNOSIS — F41.1 GAD (GENERALIZED ANXIETY DISORDER): ICD-10-CM

## 2024-07-23 DIAGNOSIS — F90.0 ATTENTION DEFICIT HYPERACTIVITY DISORDER (ADHD), PREDOMINANTLY INATTENTIVE TYPE: Primary | ICD-10-CM

## 2024-07-23 DIAGNOSIS — Z78.9 ALCOHOL USE: ICD-10-CM

## 2024-07-23 LAB
AMPHETAMINES UR QL SCN: ABNORMAL
BARBITURATES UR QL SCN: ABNORMAL
BENZODIAZ UR QL SCN: ABNORMAL
BZE UR QL SCN: ABNORMAL
CANNABINOIDS UR QL SCN: ABNORMAL
FENTANYL UR QL: ABNORMAL
OPIATES UR QL SCN: ABNORMAL
PCP QUAL URINE (ROCHE): ABNORMAL

## 2024-07-23 PROCEDURE — 80307 DRUG TEST PRSMV CHEM ANLYZR: CPT | Performed by: STUDENT IN AN ORGANIZED HEALTH CARE EDUCATION/TRAINING PROGRAM

## 2024-07-23 PROCEDURE — G2211 COMPLEX E/M VISIT ADD ON: HCPCS | Performed by: STUDENT IN AN ORGANIZED HEALTH CARE EDUCATION/TRAINING PROGRAM

## 2024-07-23 PROCEDURE — 99214 OFFICE O/P EST MOD 30 MIN: CPT | Performed by: STUDENT IN AN ORGANIZED HEALTH CARE EDUCATION/TRAINING PROGRAM

## 2024-07-23 PROCEDURE — 96127 BRIEF EMOTIONAL/BEHAV ASSMT: CPT | Performed by: STUDENT IN AN ORGANIZED HEALTH CARE EDUCATION/TRAINING PROGRAM

## 2024-07-23 RX ORDER — DEXTROAMPHETAMINE SACCHARATE, AMPHETAMINE ASPARTATE MONOHYDRATE, DEXTROAMPHETAMINE SULFATE AND AMPHETAMINE SULFATE 2.5; 2.5; 2.5; 2.5 MG/1; MG/1; MG/1; MG/1
10 CAPSULE, EXTENDED RELEASE ORAL DAILY
Qty: 30 CAPSULE | Refills: 0 | Status: SHIPPED | OUTPATIENT
Start: 2024-07-23 | End: 2024-08-18

## 2024-07-23 RX ORDER — SERTRALINE HYDROCHLORIDE 100 MG/1
100 TABLET, FILM COATED ORAL DAILY
Qty: 90 TABLET | Refills: 3 | Status: SHIPPED | OUTPATIENT
Start: 2024-07-23

## 2024-07-23 ASSESSMENT — ANXIETY QUESTIONNAIRES
6. BECOMING EASILY ANNOYED OR IRRITABLE: NOT AT ALL
IF YOU CHECKED OFF ANY PROBLEMS ON THIS QUESTIONNAIRE, HOW DIFFICULT HAVE THESE PROBLEMS MADE IT FOR YOU TO DO YOUR WORK, TAKE CARE OF THINGS AT HOME, OR GET ALONG WITH OTHER PEOPLE: NOT DIFFICULT AT ALL
8. IF YOU CHECKED OFF ANY PROBLEMS, HOW DIFFICULT HAVE THESE MADE IT FOR YOU TO DO YOUR WORK, TAKE CARE OF THINGS AT HOME, OR GET ALONG WITH OTHER PEOPLE?: NOT DIFFICULT AT ALL
GAD7 TOTAL SCORE: 3
7. FEELING AFRAID AS IF SOMETHING AWFUL MIGHT HAPPEN: NOT AT ALL
4. TROUBLE RELAXING: SEVERAL DAYS
GAD7 TOTAL SCORE: 3
2. NOT BEING ABLE TO STOP OR CONTROL WORRYING: NOT AT ALL
1. FEELING NERVOUS, ANXIOUS, OR ON EDGE: SEVERAL DAYS
3. WORRYING TOO MUCH ABOUT DIFFERENT THINGS: NOT AT ALL
GAD7 TOTAL SCORE: 3
7. FEELING AFRAID AS IF SOMETHING AWFUL MIGHT HAPPEN: NOT AT ALL
5. BEING SO RESTLESS THAT IT IS HARD TO SIT STILL: SEVERAL DAYS

## 2024-07-23 ASSESSMENT — PATIENT HEALTH QUESTIONNAIRE - PHQ9
10. IF YOU CHECKED OFF ANY PROBLEMS, HOW DIFFICULT HAVE THESE PROBLEMS MADE IT FOR YOU TO DO YOUR WORK, TAKE CARE OF THINGS AT HOME, OR GET ALONG WITH OTHER PEOPLE: NOT DIFFICULT AT ALL
SUM OF ALL RESPONSES TO PHQ QUESTIONS 1-9: 2
SUM OF ALL RESPONSES TO PHQ QUESTIONS 1-9: 2

## 2024-07-23 NOTE — LETTER
July 31, 2024      Morro Coronel  [address] [place] [address]        Dear ,    We are writing to inform you of your test results.      UDS within the expected range.     Resulted Orders   Urine Drug Screen Panel   Result Value Ref Range    Amphetamines Urine Screen Positive (A) Screen Negative      Comment:      Cutoff for a positive amphetamine is 500 ng/mL or greater.   This is an unconfirmed screening result to be used for medical purposes only.    Barbituates Urine Screen Negative Screen Negative      Comment:      Cutoff for a negative barbiturate is less than 200 ng/mL.    Benzodiazepine Urine Screen Negative Screen Negative      Comment:      Cutoff for a negative benzodiazepine is less than 100 ng/mL.    Cannabinoids Urine Screen Negative Screen Negative      Comment:      Cutoff for a negative cannabinoid is less than 50 ng/mL.    Cocaine Urine Screen Negative Screen Negative      Comment:      Cutoff for a negative cocaine is less than 300 ng/mL.    Fentanyl Qual Urine Screen Negative Screen Negative      Comment:      Cutoff for negative fentanyl is less than 5 ng/mL.    Opiates Urine Screen Negative Screen Negative      Comment:      Cutoff for a negative opiate is less than 300 ng/mL.    PCP Urine Screen Negative Screen Negative      Comment:      Cutoff for a negative PCP is less than 25 ng/mL.       If you have any questions or concerns, please call the clinic at the number listed above.       Sincerely,      Yudith James, DO

## 2024-07-23 NOTE — PROGRESS NOTES
Assessment & Plan      Diagnosis Comments   1. Attention deficit hyperactivity disorder (ADHD), predominantly inattentive type  amphetamine-dextroamphetamine (ADDERALL XR) 10 MG 24 hr capsule, Urine Drug Screen       2. JEAN-PAUL (generalized anxiety disorder)  sertraline (ZOLOFT) 100 MG tablet, sertraline (ZOLOFT) 50 MG tablet       3. Moderate episode of recurrent major depressive disorder (H)  sertraline (ZOLOFT) 50 MG tablet       4. Alcohol use              Attention deficit hyperactivity disorder (ADHD), predominantly inattentive type  MDD/JEAN-PAUL  Alcohol use  PHQ-9 score 2  JEAN-PAUL-7 score 3  No active SI/HI  Both JEAN-PAUL-7 and PHQ-9 scores are downtrending  I started him on Adderall 5 mg XR last time.  Today he feels like he is able to get test done and concentrate much more.  Noted increased anxiety but reports that to be in the context of increased stress with his wife's work stress, his personal work stress and changes in leadership at work.  Denies any appetite suppressant, tachycardia, elevated blood pressures with stimulants.  No insomnia.  Notes mid-day slump around 1-3 pm on current 5m dosage of adderall xr. Takes dose at 7 am.   Is drinking about 2 beers a day.  He is reporting that overall consumption of alcohol is decreased.  He is continuing to go to therapy which he finds helpful.  He feels that he is good on Zoloft 150 mg currently.  He is hoping that alcohol use will continue to decrease once he gets his ADHD under control.  Plan:  - Will increase Adderall to 10 mg XR  - Random UDS ordered today  -Will follow-up in 2 months  -Continue Zoloft at the current dose  - Continue therapy    The longitudinal plan of care for the diagnosis(es)/condition(s) as documented were addressed during this visit. Due to the added complexity in care, I will continue to support Morro in the subsequent management and with ongoing continuity of care.      Yony Hooker is a 39 year old, presenting for the following health  "issues:  Follow Up (1 month ADHD, depression and anxiety follow up. Patients states his ADHD, depression and anxiety has improved with the medication. He feels there is room for improvement. )        7/23/2024    10:05 AM   Additional Questions   Roomed by Galina HODGES MA   Accompanied by Self            5/1/2024     9:23 AM 6/18/2024     7:44 AM 7/23/2024     9:53 AM   PHQ   PHQ-9 Total Score 6 4 2   Q9: Thoughts of better off dead/self-harm past 2 weeks Not at all Not at all Not at all          4/19/2024     3:40 PM 6/18/2024     7:45 AM 7/23/2024     9:54 AM   JEAN-PAUL-7 SCORE   Total Score 6 (mild anxiety) 4 (minimal anxiety) 3 (minimal anxiety)   Total Score 6 4 3          Review of Systems  As per HPI      Objective    /60 (BP Location: Right arm, Patient Position: Sitting, Cuff Size: Adult Large)   Pulse 73   Temp 99  F (37.2  C) (Oral)   Resp 16   Ht 6' 2\" (1.88 m)   Wt 193 lb 1.6 oz (87.6 kg)   SpO2 97%   BMI 24.79 kg/m    Body mass index is 24.79 kg/m .  Physical Exam  Vitals reviewed.   Constitutional:       Appearance: Normal appearance.   Neurological:      Mental Status: He is alert and oriented to person, place, and time.   Psychiatric:         Behavior: Behavior normal.         Thought Content: Thought content normal.         Judgment: Judgment normal.      Comments: (+) anxious but overall appropriate. Good insight.               Kiera Manuel, MS3  Signed Electronically by: Yudith James DO    Physician Attestation   I, Yudith James DO, was present with the medical/SHERRIE student who participated in the service and in the documentation of the note.  I have verified the history and personally performed the physical exam and medical decision making.  I agree with the assessment and plan of care as documented in the note.      Items personally reviewed: vitals, labs, and imaging and agree with the interpretation documented in the note.    Yudith James DO    "

## 2024-08-13 ENCOUNTER — ALLIED HEALTH/NURSE VISIT (OUTPATIENT)
Dept: FAMILY MEDICINE | Facility: CLINIC | Age: 39
End: 2024-08-13
Payer: COMMERCIAL

## 2024-08-13 DIAGNOSIS — Z23 ENCOUNTER FOR IMMUNIZATION: Primary | ICD-10-CM

## 2024-08-13 PROCEDURE — 90746 HEPB VACCINE 3 DOSE ADULT IM: CPT

## 2024-08-13 PROCEDURE — 90471 IMMUNIZATION ADMIN: CPT

## 2024-08-18 ENCOUNTER — MYC REFILL (OUTPATIENT)
Dept: FAMILY MEDICINE | Facility: CLINIC | Age: 39
End: 2024-08-18
Payer: COMMERCIAL

## 2024-08-18 DIAGNOSIS — F90.0 ATTENTION DEFICIT HYPERACTIVITY DISORDER (ADHD), PREDOMINANTLY INATTENTIVE TYPE: ICD-10-CM

## 2024-08-20 RX ORDER — DEXTROAMPHETAMINE SACCHARATE, AMPHETAMINE ASPARTATE MONOHYDRATE, DEXTROAMPHETAMINE SULFATE AND AMPHETAMINE SULFATE 2.5; 2.5; 2.5; 2.5 MG/1; MG/1; MG/1; MG/1
10 CAPSULE, EXTENDED RELEASE ORAL DAILY
Qty: 30 CAPSULE | Refills: 0 | Status: SHIPPED | OUTPATIENT
Start: 2024-08-20 | End: 2024-09-06

## 2024-09-05 ENCOUNTER — MYC MEDICAL ADVICE (OUTPATIENT)
Dept: FAMILY MEDICINE | Facility: CLINIC | Age: 39
End: 2024-09-05
Payer: COMMERCIAL

## 2024-09-06 ENCOUNTER — VIRTUAL VISIT (OUTPATIENT)
Dept: FAMILY MEDICINE | Facility: CLINIC | Age: 39
End: 2024-09-06
Payer: COMMERCIAL

## 2024-09-06 DIAGNOSIS — F90.0 ATTENTION DEFICIT HYPERACTIVITY DISORDER (ADHD), PREDOMINANTLY INATTENTIVE TYPE: Primary | ICD-10-CM

## 2024-09-06 DIAGNOSIS — F43.21 GRIEF: ICD-10-CM

## 2024-09-06 PROCEDURE — 99214 OFFICE O/P EST MOD 30 MIN: CPT | Mod: 95 | Performed by: STUDENT IN AN ORGANIZED HEALTH CARE EDUCATION/TRAINING PROGRAM

## 2024-09-06 PROCEDURE — G2211 COMPLEX E/M VISIT ADD ON: HCPCS | Mod: 95 | Performed by: STUDENT IN AN ORGANIZED HEALTH CARE EDUCATION/TRAINING PROGRAM

## 2024-09-06 RX ORDER — DEXTROAMPHETAMINE SACCHARATE, AMPHETAMINE ASPARTATE MONOHYDRATE, DEXTROAMPHETAMINE SULFATE AND AMPHETAMINE SULFATE 1.25; 1.25; 1.25; 1.25 MG/1; MG/1; MG/1; MG/1
5 CAPSULE, EXTENDED RELEASE ORAL DAILY
Qty: 30 CAPSULE | Refills: 0 | Status: SHIPPED | OUTPATIENT
Start: 2024-09-20

## 2024-09-06 RX ORDER — DEXTROAMPHETAMINE SACCHARATE, AMPHETAMINE ASPARTATE MONOHYDRATE, DEXTROAMPHETAMINE SULFATE AND AMPHETAMINE SULFATE 2.5; 2.5; 2.5; 2.5 MG/1; MG/1; MG/1; MG/1
10 CAPSULE, EXTENDED RELEASE ORAL DAILY
Qty: 30 CAPSULE | Refills: 0 | Status: SHIPPED | OUTPATIENT
Start: 2024-09-20

## 2024-09-06 ASSESSMENT — ANXIETY QUESTIONNAIRES
7. FEELING AFRAID AS IF SOMETHING AWFUL MIGHT HAPPEN: NOT AT ALL
GAD7 TOTAL SCORE: 5
GAD7 TOTAL SCORE: 5
8. IF YOU CHECKED OFF ANY PROBLEMS, HOW DIFFICULT HAVE THESE MADE IT FOR YOU TO DO YOUR WORK, TAKE CARE OF THINGS AT HOME, OR GET ALONG WITH OTHER PEOPLE?: NOT DIFFICULT AT ALL
GAD7 TOTAL SCORE: 5

## 2024-09-06 ASSESSMENT — PATIENT HEALTH QUESTIONNAIRE - PHQ9
SUM OF ALL RESPONSES TO PHQ QUESTIONS 1-9: 5
10. IF YOU CHECKED OFF ANY PROBLEMS, HOW DIFFICULT HAVE THESE PROBLEMS MADE IT FOR YOU TO DO YOUR WORK, TAKE CARE OF THINGS AT HOME, OR GET ALONG WITH OTHER PEOPLE: NOT DIFFICULT AT ALL
SUM OF ALL RESPONSES TO PHQ QUESTIONS 1-9: 5

## 2024-09-06 NOTE — PROGRESS NOTES
Morro is a 39 year old who is being evaluated via a billable video visit.    How would you like to obtain your AVS? MyChart  If the video visit is dropped, the invitation should be resent by: Send to e-mail at: [email or web address]  Will anyone else be joining your video visit? No      Assessment & Plan      Diagnosis Comments   1. Attention deficit hyperactivity disorder (ADHD), predominantly inattentive type  amphetamine-dextroamphetamine (ADDERALL XR) 10 MG 24 hr capsule, amphetamine-dextroamphetamine (ADDERALL XR) 5 MG 24 hr capsule       2. Grief            ADHD:  Grief  Last time he was seen, we had increased his Adderall XR from 5 mg to 10 mg  Today, he continues to report a midday dull where he becomes extremely fatigued.  He has not noticed any significant change in ADHD symptoms.  He has been struggling with depression and anxiety because his dog unexpectedly  earlier this month.  His children and him are still grieving.  Feels that therapy has been helpful during this time.  He has not noticed any significant side effects from the increased dose of Adderall including unintentional weight loss, appetite suppression, insomnia or worsened anxiety.  Plan:  - Will increase Adderall to 15 mg.  He will be able to  the new dose on .  - I will see him a month after  to assess ADHD symptoms  - Will continue to work on working through his grief with his therapist.    The longitudinal plan of care for the diagnosis(es)/condition(s) as documented were addressed during this visit. Due to the added complexity in care, I will continue to support Morro in the subsequent management and with ongoing continuity of care.    Subjective   Morro is a 39 year old, presenting for the following health issues:  Medication Request      2024     3:49 PM   Additional Questions   Roomed by royce            Review of Systems  As per HPI      Objective           Vitals:  No vitals were obtained  today due to virtual visit.    Physical Exam   GENERAL: alert and no distress  EYES: Eyes grossly normal to inspection.  No discharge or erythema, or obvious scleral/conjunctival abnormalities.  RESP: No audible wheeze, cough, or visible cyanosis.    SKIN: Visible skin clear. No significant rash, abnormal pigmentation or lesions.  NEURO: Cranial nerves grossly intact.  Mentation and speech appropriate for age.  PSYCH: Appropriate affect, tone, and pace of words. Slightly anxious, a bit down but overall appropriate         Video-Visit Details    Type of service:  Video Visit   Originating Location (pt. Location): Home    Distant Location (provider location):  On-site  Platform used for Video Visit: Cesario  Signed Electronically by: Yudith James DO

## 2024-10-22 ENCOUNTER — MYC REFILL (OUTPATIENT)
Dept: FAMILY MEDICINE | Facility: CLINIC | Age: 39
End: 2024-10-22
Payer: COMMERCIAL

## 2024-10-22 DIAGNOSIS — F90.0 ATTENTION DEFICIT HYPERACTIVITY DISORDER (ADHD), PREDOMINANTLY INATTENTIVE TYPE: ICD-10-CM

## 2024-10-23 ENCOUNTER — E-VISIT (OUTPATIENT)
Dept: FAMILY MEDICINE | Facility: CLINIC | Age: 39
End: 2024-10-23
Payer: COMMERCIAL

## 2024-10-23 DIAGNOSIS — F90.0 ATTENTION-DEFICIT HYPERACTIVITY DISORDER, PREDOMINANTLY INATTENTIVE TYPE: Primary | ICD-10-CM

## 2024-10-23 PROCEDURE — 99207 PR NON-BILLABLE SERV PER CHARTING: CPT | Performed by: STUDENT IN AN ORGANIZED HEALTH CARE EDUCATION/TRAINING PROGRAM

## 2024-10-23 RX ORDER — DEXTROAMPHETAMINE SACCHARATE, AMPHETAMINE ASPARTATE MONOHYDRATE, DEXTROAMPHETAMINE SULFATE AND AMPHETAMINE SULFATE 1.25; 1.25; 1.25; 1.25 MG/1; MG/1; MG/1; MG/1
5 CAPSULE, EXTENDED RELEASE ORAL DAILY
Qty: 30 CAPSULE | Refills: 0 | Status: SHIPPED | OUTPATIENT
Start: 2024-10-23

## 2024-10-23 RX ORDER — DEXTROAMPHETAMINE SACCHARATE, AMPHETAMINE ASPARTATE MONOHYDRATE, DEXTROAMPHETAMINE SULFATE AND AMPHETAMINE SULFATE 2.5; 2.5; 2.5; 2.5 MG/1; MG/1; MG/1; MG/1
10 CAPSULE, EXTENDED RELEASE ORAL DAILY
Qty: 30 CAPSULE | Refills: 0 | Status: SHIPPED | OUTPATIENT
Start: 2024-10-23

## 2024-10-23 ASSESSMENT — PATIENT HEALTH QUESTIONNAIRE - PHQ9
SUM OF ALL RESPONSES TO PHQ QUESTIONS 1-9: 5
10. IF YOU CHECKED OFF ANY PROBLEMS, HOW DIFFICULT HAVE THESE PROBLEMS MADE IT FOR YOU TO DO YOUR WORK, TAKE CARE OF THINGS AT HOME, OR GET ALONG WITH OTHER PEOPLE: SOMEWHAT DIFFICULT
SUM OF ALL RESPONSES TO PHQ QUESTIONS 1-9: 5

## 2024-10-23 ASSESSMENT — ANXIETY QUESTIONNAIRES
1. FEELING NERVOUS, ANXIOUS, OR ON EDGE: SEVERAL DAYS
IF YOU CHECKED OFF ANY PROBLEMS ON THIS QUESTIONNAIRE, HOW DIFFICULT HAVE THESE PROBLEMS MADE IT FOR YOU TO DO YOUR WORK, TAKE CARE OF THINGS AT HOME, OR GET ALONG WITH OTHER PEOPLE: SOMEWHAT DIFFICULT
GAD7 TOTAL SCORE: 5
GAD7 TOTAL SCORE: 5
7. FEELING AFRAID AS IF SOMETHING AWFUL MIGHT HAPPEN: SEVERAL DAYS
6. BECOMING EASILY ANNOYED OR IRRITABLE: SEVERAL DAYS
5. BEING SO RESTLESS THAT IT IS HARD TO SIT STILL: NOT AT ALL
3. WORRYING TOO MUCH ABOUT DIFFERENT THINGS: SEVERAL DAYS
4. TROUBLE RELAXING: SEVERAL DAYS
2. NOT BEING ABLE TO STOP OR CONTROL WORRYING: NOT AT ALL
7. FEELING AFRAID AS IF SOMETHING AWFUL MIGHT HAPPEN: SEVERAL DAYS
8. IF YOU CHECKED OFF ANY PROBLEMS, HOW DIFFICULT HAVE THESE MADE IT FOR YOU TO DO YOUR WORK, TAKE CARE OF THINGS AT HOME, OR GET ALONG WITH OTHER PEOPLE?: SOMEWHAT DIFFICULT
GAD7 TOTAL SCORE: 5

## 2024-10-23 NOTE — TELEPHONE ENCOUNTER
PDMP reviewed, refill provided     Please call patient - he can submit and E-visit with his symptoms and we can decide if he needs titration of meds based on this

## 2024-10-24 ENCOUNTER — IMMUNIZATION (OUTPATIENT)
Dept: PEDIATRICS | Facility: CLINIC | Age: 39
End: 2024-10-24
Payer: COMMERCIAL

## 2024-10-24 PROCEDURE — 90471 IMMUNIZATION ADMIN: CPT

## 2024-10-24 PROCEDURE — 90656 IIV3 VACC NO PRSV 0.5 ML IM: CPT

## 2024-10-24 PROCEDURE — 90480 ADMN SARSCOV2 VAC 1/ONLY CMP: CPT

## 2024-10-24 PROCEDURE — 91320 SARSCV2 VAC 30MCG TRS-SUC IM: CPT

## 2024-11-07 ASSESSMENT — PATIENT HEALTH QUESTIONNAIRE - PHQ9: SUM OF ALL RESPONSES TO PHQ QUESTIONS 1-9: 4

## 2024-11-07 ASSESSMENT — ANXIETY QUESTIONNAIRES: GAD7 TOTAL SCORE: 3

## 2024-11-22 ENCOUNTER — MYC REFILL (OUTPATIENT)
Dept: FAMILY MEDICINE | Facility: CLINIC | Age: 39
End: 2024-11-22
Payer: COMMERCIAL

## 2024-11-22 DIAGNOSIS — F90.0 ATTENTION DEFICIT HYPERACTIVITY DISORDER (ADHD), PREDOMINANTLY INATTENTIVE TYPE: ICD-10-CM

## 2024-11-25 RX ORDER — DEXTROAMPHETAMINE SACCHARATE, AMPHETAMINE ASPARTATE MONOHYDRATE, DEXTROAMPHETAMINE SULFATE AND AMPHETAMINE SULFATE 1.25; 1.25; 1.25; 1.25 MG/1; MG/1; MG/1; MG/1
5 CAPSULE, EXTENDED RELEASE ORAL DAILY
Qty: 30 CAPSULE | Refills: 0 | Status: SHIPPED | OUTPATIENT
Start: 2024-11-25

## 2024-11-25 RX ORDER — DEXTROAMPHETAMINE SACCHARATE, AMPHETAMINE ASPARTATE MONOHYDRATE, DEXTROAMPHETAMINE SULFATE AND AMPHETAMINE SULFATE 2.5; 2.5; 2.5; 2.5 MG/1; MG/1; MG/1; MG/1
10 CAPSULE, EXTENDED RELEASE ORAL DAILY
Qty: 30 CAPSULE | Refills: 0 | Status: SHIPPED | OUTPATIENT
Start: 2024-11-25

## 2024-12-10 ENCOUNTER — VIRTUAL VISIT (OUTPATIENT)
Dept: FAMILY MEDICINE | Facility: CLINIC | Age: 39
End: 2024-12-10
Payer: COMMERCIAL

## 2024-12-10 DIAGNOSIS — F90.0 ATTENTION DEFICIT HYPERACTIVITY DISORDER (ADHD), PREDOMINANTLY INATTENTIVE TYPE: ICD-10-CM

## 2024-12-10 DIAGNOSIS — Z78.9 ALCOHOL USE: ICD-10-CM

## 2024-12-10 DIAGNOSIS — F41.1 GAD (GENERALIZED ANXIETY DISORDER): Primary | ICD-10-CM

## 2024-12-10 DIAGNOSIS — F33.1 MODERATE EPISODE OF RECURRENT MAJOR DEPRESSIVE DISORDER (H): ICD-10-CM

## 2024-12-10 PROCEDURE — G2211 COMPLEX E/M VISIT ADD ON: HCPCS | Mod: 95 | Performed by: STUDENT IN AN ORGANIZED HEALTH CARE EDUCATION/TRAINING PROGRAM

## 2024-12-10 PROCEDURE — 96127 BRIEF EMOTIONAL/BEHAV ASSMT: CPT | Mod: 95 | Performed by: STUDENT IN AN ORGANIZED HEALTH CARE EDUCATION/TRAINING PROGRAM

## 2024-12-10 PROCEDURE — 99214 OFFICE O/P EST MOD 30 MIN: CPT | Mod: 95 | Performed by: STUDENT IN AN ORGANIZED HEALTH CARE EDUCATION/TRAINING PROGRAM

## 2024-12-10 RX ORDER — DEXTROAMPHETAMINE SACCHARATE, AMPHETAMINE ASPARTATE MONOHYDRATE, DEXTROAMPHETAMINE SULFATE AND AMPHETAMINE SULFATE 1.25; 1.25; 1.25; 1.25 MG/1; MG/1; MG/1; MG/1
5 CAPSULE, EXTENDED RELEASE ORAL DAILY
Qty: 10 CAPSULE | Refills: 0 | Status: SHIPPED | OUTPATIENT
Start: 2024-12-10

## 2024-12-10 RX ORDER — DEXTROAMPHETAMINE SACCHARATE, AMPHETAMINE ASPARTATE MONOHYDRATE, DEXTROAMPHETAMINE SULFATE AND AMPHETAMINE SULFATE 1.25; 1.25; 1.25; 1.25 MG/1; MG/1; MG/1; MG/1
5 CAPSULE, EXTENDED RELEASE ORAL DAILY
Qty: 10 CAPSULE | Refills: 0 | Status: SHIPPED | OUTPATIENT
Start: 2024-12-10 | End: 2024-12-10

## 2024-12-10 RX ORDER — MUPIROCIN 20 MG/G
OINTMENT TOPICAL
COMMUNITY
Start: 2024-09-11

## 2024-12-10 RX ORDER — CLOBETASOL PROPIONATE 0.5 MG/G
OINTMENT TOPICAL
COMMUNITY
Start: 2024-09-11

## 2024-12-10 RX ORDER — TACROLIMUS 1 MG/G
OINTMENT TOPICAL
COMMUNITY
Start: 2024-09-11

## 2024-12-10 ASSESSMENT — ANXIETY QUESTIONNAIRES
GAD7 TOTAL SCORE: 2
3. WORRYING TOO MUCH ABOUT DIFFERENT THINGS: NOT AT ALL
8. IF YOU CHECKED OFF ANY PROBLEMS, HOW DIFFICULT HAVE THESE MADE IT FOR YOU TO DO YOUR WORK, TAKE CARE OF THINGS AT HOME, OR GET ALONG WITH OTHER PEOPLE?: SOMEWHAT DIFFICULT
7. FEELING AFRAID AS IF SOMETHING AWFUL MIGHT HAPPEN: NOT AT ALL
IF YOU CHECKED OFF ANY PROBLEMS ON THIS QUESTIONNAIRE, HOW DIFFICULT HAVE THESE PROBLEMS MADE IT FOR YOU TO DO YOUR WORK, TAKE CARE OF THINGS AT HOME, OR GET ALONG WITH OTHER PEOPLE: SOMEWHAT DIFFICULT
2. NOT BEING ABLE TO STOP OR CONTROL WORRYING: NOT AT ALL
GAD7 TOTAL SCORE: 2
5. BEING SO RESTLESS THAT IT IS HARD TO SIT STILL: NOT AT ALL
1. FEELING NERVOUS, ANXIOUS, OR ON EDGE: NOT AT ALL
GAD7 TOTAL SCORE: 2
4. TROUBLE RELAXING: SEVERAL DAYS
7. FEELING AFRAID AS IF SOMETHING AWFUL MIGHT HAPPEN: NOT AT ALL
6. BECOMING EASILY ANNOYED OR IRRITABLE: SEVERAL DAYS

## 2024-12-10 ASSESSMENT — PATIENT HEALTH QUESTIONNAIRE - PHQ9: SUM OF ALL RESPONSES TO PHQ QUESTIONS 1-9: 2

## 2024-12-10 NOTE — PROGRESS NOTES
Morro is a 39 year old who is being evaluated via a billable video visit.    How would you like to obtain your AVS? [employer]  If the video visit is dropped, the invitation should be resent by: Text to cell phone: [phone]  Will anyone else be joining your video visit? No      Assessment & Plan      Diagnosis Comments   1. JEAN-PAUL (generalized anxiety disorder)  NV BEHAV ASSMT W/SCORE & DOCD/STAND INSTRUMENT       2. Attention deficit hyperactivity disorder (ADHD), predominantly inattentive type  amphetamine-dextroamphetamine (ADDERALL XR) 5 MG 24 hr capsule       3. Alcohol use        4. Moderate episode of recurrent major depressive disorder (H)  NV BEHAV ASSMT W/SCORE & DOCD/STAND INSTRUMENT           MDD/JEAN-PAUL  Alcohol use  PHQ-9 score 2   JEAN-PAUL-7 score 2  Patient seems to be doing well from a mental standpoint.  He has developed great insight.  Congratulated him on all of his progress.  Encouraged him strongly to continue to cut down on alcohol.  Will not make any changes to his meds today  He should continue talk therapy    ADHD:  Told patient that his reported data is confounding.  I am not sure if the midday crash is due to the caffeine rather than suboptimal dosing of his ADHD medications.  I would opt not to change any meds today.  Instead, would encourage him to discontinue or severely limit caffeine and see how he does with his midday crashes.  We will follow-up in 6 to 8 weeks for symptom intake.  He verbalized understanding.  If he still having midday crashes, we can consider increasing Adderall XR to 20 mg at that time.  Patient also only received 20 tabs of his Adderall XR 5 mg.  He is wondering if I can send in the rest of the 10 pills then.  Will do so.  If there is still a shortage, he will reach out to me via [employer].        9/6/2024     3:48 PM 10/23/2024     4:05 PM 12/10/2024     3:44 PM   PHQ   PHQ-9 Total Score 5 5  2   Q9: Thoughts of better off dead/self-harm past 2 weeks Not at all  Not at all  " Not at all       Patient-reported           9/6/2024     3:49 PM 10/23/2024     4:05 PM 12/10/2024     3:19 PM   JEAN-PAUL-7 SCORE   Total Score 5 (mild anxiety) 5 (mild anxiety) 2 (minimal anxiety)   Total Score 5 5  2        Patient-reported       The longitudinal plan of care for the diagnosis(es)/condition(s) as documented were addressed during this visit. Due to the added complexity in care, I will continue to support Morro in the subsequent management and with ongoing continuity of care.    Subjective   Morro is a 39 year old, presenting for the following health issues:  Depression  (Follow up ) and ADHD Follow UP         12/10/2024     3:42 PM   Additional Questions   Roomed by ELEN Arias          Patient feels like he has a lot more resilience from a mental health standpoint.  He is able to identify how he is feeling more quickly and has good coping skills.  He had feels less overwhelmed.  He is able to enjoy his life more.  He is continuing to deal with the grief of losing his dog but overall doing well.  Feels like work is going okay as well  He is drinking about 6 beers a week.  He is much more conscious of how much alcohol he drinks.  It does not \"make him feel good anymore\" and so he is actively trying to cut down.  He thinks the holidays presented him with more opportunities to drink.  He will continue to cut down.  Does not think he needs any additional resources.  As far as his ADHD is concerned, he feels like the increase in the 15 mg was helpful but he still having midday crashes.  However, crashes do not occur every day and they are generally associated with the days that he has had more coffee than normal.  He is not having any side effects from the medications.      Review of Systems  As per HPI      Objective           Vitals:  No vitals were obtained today due to virtual visit.    Physical Exam   GENERAL: alert and no distress  EYES: Eyes grossly normal to inspection.  No discharge or " erythema, or obvious scleral/conjunctival abnormalities.  RESP: No audible wheeze, cough, or visible cyanosis.    SKIN: Visible skin clear. No significant rash, abnormal pigmentation or lesions.  NEURO: Cranial nerves grossly intact.  Mentation and speech appropriate for age.  PSYCH: Appropriate affect, tone, and pace of words      Video-Visit Details    Type of service:  Video Visit     Video start time: 4:15 PM    Video end time: 4:35 PM    Originating Location (pt. Location): Home    Distant Location (provider location):  On-site  Platform used for Video Visit: Cesario  Signed Electronically by: Yudith James DO

## 2024-12-28 ENCOUNTER — MYC REFILL (OUTPATIENT)
Dept: FAMILY MEDICINE | Facility: CLINIC | Age: 39
End: 2024-12-28
Payer: COMMERCIAL

## 2024-12-28 DIAGNOSIS — F90.0 ATTENTION DEFICIT HYPERACTIVITY DISORDER (ADHD), PREDOMINANTLY INATTENTIVE TYPE: ICD-10-CM

## 2024-12-31 RX ORDER — DEXTROAMPHETAMINE SACCHARATE, AMPHETAMINE ASPARTATE MONOHYDRATE, DEXTROAMPHETAMINE SULFATE AND AMPHETAMINE SULFATE 1.25; 1.25; 1.25; 1.25 MG/1; MG/1; MG/1; MG/1
5 CAPSULE, EXTENDED RELEASE ORAL DAILY
Qty: 10 CAPSULE | Refills: 0 | Status: SHIPPED | OUTPATIENT
Start: 2024-12-31

## 2024-12-31 RX ORDER — DEXTROAMPHETAMINE SACCHARATE, AMPHETAMINE ASPARTATE MONOHYDRATE, DEXTROAMPHETAMINE SULFATE AND AMPHETAMINE SULFATE 2.5; 2.5; 2.5; 2.5 MG/1; MG/1; MG/1; MG/1
10 CAPSULE, EXTENDED RELEASE ORAL DAILY
Qty: 30 CAPSULE | Refills: 0 | Status: SHIPPED | OUTPATIENT
Start: 2024-12-31

## 2025-01-21 ENCOUNTER — VIRTUAL VISIT (OUTPATIENT)
Dept: FAMILY MEDICINE | Facility: CLINIC | Age: 40
End: 2025-01-21
Payer: COMMERCIAL

## 2025-01-21 DIAGNOSIS — F33.1 MODERATE EPISODE OF RECURRENT MAJOR DEPRESSIVE DISORDER (H): ICD-10-CM

## 2025-01-21 DIAGNOSIS — Z78.9 ALCOHOL USE: ICD-10-CM

## 2025-01-21 DIAGNOSIS — F90.0 ATTENTION DEFICIT HYPERACTIVITY DISORDER (ADHD), PREDOMINANTLY INATTENTIVE TYPE: ICD-10-CM

## 2025-01-21 DIAGNOSIS — F41.1 GAD (GENERALIZED ANXIETY DISORDER): Primary | ICD-10-CM

## 2025-01-21 PROCEDURE — 96127 BRIEF EMOTIONAL/BEHAV ASSMT: CPT | Mod: 95 | Performed by: STUDENT IN AN ORGANIZED HEALTH CARE EDUCATION/TRAINING PROGRAM

## 2025-01-21 PROCEDURE — 98006 SYNCH AUDIO-VIDEO EST MOD 30: CPT | Performed by: STUDENT IN AN ORGANIZED HEALTH CARE EDUCATION/TRAINING PROGRAM

## 2025-01-21 RX ORDER — DEXTROAMPHETAMINE SACCHARATE, AMPHETAMINE ASPARTATE MONOHYDRATE, DEXTROAMPHETAMINE SULFATE AND AMPHETAMINE SULFATE 2.5; 2.5; 2.5; 2.5 MG/1; MG/1; MG/1; MG/1
10 CAPSULE, EXTENDED RELEASE ORAL DAILY
Qty: 10 CAPSULE | Refills: 0 | Status: SHIPPED | OUTPATIENT
Start: 2025-01-21 | End: 2025-01-23

## 2025-01-21 RX ORDER — DEXTROAMPHETAMINE SACCHARATE, AMPHETAMINE ASPARTATE MONOHYDRATE, DEXTROAMPHETAMINE SULFATE AND AMPHETAMINE SULFATE 5; 5; 5; 5 MG/1; MG/1; MG/1; MG/1
20 CAPSULE, EXTENDED RELEASE ORAL DAILY
Qty: 30 CAPSULE | Refills: 0 | Status: SHIPPED | OUTPATIENT
Start: 2025-02-01 | End: 2025-01-23

## 2025-01-21 RX ORDER — HYDROXYZINE HYDROCHLORIDE 25 MG/1
25 TABLET, FILM COATED ORAL
Qty: 60 TABLET | Refills: 0 | Status: SHIPPED | OUTPATIENT
Start: 2025-01-21

## 2025-01-21 RX ORDER — PROPRANOLOL HYDROCHLORIDE 60 MG/1
60 CAPSULE, EXTENDED RELEASE ORAL DAILY
Qty: 90 CAPSULE | Refills: 0 | Status: SHIPPED | OUTPATIENT
Start: 2025-01-21

## 2025-01-21 ASSESSMENT — ANXIETY QUESTIONNAIRES
3. WORRYING TOO MUCH ABOUT DIFFERENT THINGS: MORE THAN HALF THE DAYS
6. BECOMING EASILY ANNOYED OR IRRITABLE: MORE THAN HALF THE DAYS
GAD7 TOTAL SCORE: 14
7. FEELING AFRAID AS IF SOMETHING AWFUL MIGHT HAPPEN: SEVERAL DAYS
GAD7 TOTAL SCORE: 14
GAD7 TOTAL SCORE: 14
4. TROUBLE RELAXING: NEARLY EVERY DAY
7. FEELING AFRAID AS IF SOMETHING AWFUL MIGHT HAPPEN: SEVERAL DAYS
8. IF YOU CHECKED OFF ANY PROBLEMS, HOW DIFFICULT HAVE THESE MADE IT FOR YOU TO DO YOUR WORK, TAKE CARE OF THINGS AT HOME, OR GET ALONG WITH OTHER PEOPLE?: VERY DIFFICULT
IF YOU CHECKED OFF ANY PROBLEMS ON THIS QUESTIONNAIRE, HOW DIFFICULT HAVE THESE PROBLEMS MADE IT FOR YOU TO DO YOUR WORK, TAKE CARE OF THINGS AT HOME, OR GET ALONG WITH OTHER PEOPLE: VERY DIFFICULT
5. BEING SO RESTLESS THAT IT IS HARD TO SIT STILL: MORE THAN HALF THE DAYS
2. NOT BEING ABLE TO STOP OR CONTROL WORRYING: MORE THAN HALF THE DAYS
1. FEELING NERVOUS, ANXIOUS, OR ON EDGE: MORE THAN HALF THE DAYS

## 2025-01-21 ASSESSMENT — PATIENT HEALTH QUESTIONNAIRE - PHQ9
10. IF YOU CHECKED OFF ANY PROBLEMS, HOW DIFFICULT HAVE THESE PROBLEMS MADE IT FOR YOU TO DO YOUR WORK, TAKE CARE OF THINGS AT HOME, OR GET ALONG WITH OTHER PEOPLE: VERY DIFFICULT
SUM OF ALL RESPONSES TO PHQ QUESTIONS 1-9: 13
SUM OF ALL RESPONSES TO PHQ QUESTIONS 1-9: 13

## 2025-01-21 NOTE — PROGRESS NOTES
Morro is a 39 year old who is being evaluated via a billable video visit.    How would you like to obtain your AVS? MyChart  If the video visit is dropped, the invitation should be resent by: Text to cell phone: [phone]  Will anyone else be joining your video visit? No      Assessment & Plan      Diagnosis Comments   1. JEAN-PAUL (generalized anxiety disorder)  propranolol ER (INDERAL LA) 60 MG 24 hr capsule, hydrOXYzine HCl (ATARAX) 25 MG tablet, MD BEHAV ASSMT W/SCORE & DOCD/STAND INSTRUMENT       2. Moderate episode of recurrent major depressive disorder (H)  propranolol ER (INDERAL LA) 60 MG 24 hr capsule, MD BEHAV ASSMT W/SCORE & DOCD/STAND INSTRUMENT       3. Attention deficit hyperactivity disorder (ADHD), predominantly inattentive type  amphetamine-dextroamphetamine (ADDERALL XR) 10 MG 24 hr capsule, amphetamine-dextroamphetamine (ADDERALL XR) 20 MG 24 hr capsule       4. Alcohol use          ADHD   Alcohol use  MDD/JEAN-PAUL  PHQ 9 score 13   JEAN-PAUL 7 score 14  No active SI/HI   Unfortunately, medication was not sent with the correct amount to the pharmacy.  He was only given 10 days of his 5 mg dosage.  He is back down to Adderall 10 mg and he has been struggling quite a bit.  This has been compounded with ongoing recent stressors: New dog, hurt his kids have been very sick and has been extremely busy season at work.  He feels like his ADHD symptoms are very uncontrolled and he has been unproductive.  Has been experiencing a lot of task paralysis.  This has, understandably, worsened his underlying depression and anxiety.  He feels like he has been having a really hard time going to sleep  He does report that he cut down on his caffeine intake as per our discussion at the time his midday crashes have decreased  He is wondering today if we could perhaps go up on the dose of the Adderall.  Even when he was on Adderall 15 mg, he was still having breakthrough symptoms  Drinking is also increased with increased  "anxiety/stress levels.  He also attributes it to the holidays.  He did not specifically quantify how much he is drinking.  Is aware that he needs to cut down.  Plan:  - Okay to increase Adderall dose to 20 mg XR.  He has 10 days left of the 10 mg XR.  Will send in an additional 10 mg XR capsule to take together for total of 20 mg until Feb 1st, 2025.  - Will send in 20 mg XR pill to be picked up on February 1  - Discussed addressing underlying anxiety/depression as well to optimize mental health.  Patient is hesitant to increase Zoloft as the higher dose made him feel like \"a robot\" in the past.  Has not done well on bupropion  either.  - Ultimately, we decided that he may benefit from propranolol to help with anxiety.  He is amenable.   - He is also trialed his wife's Atarax at night to help with sleep and it works.  He is requesting that for now.  I think that is reasonable.  -Advised to cut down on drinking  - Continue therapy        10/23/2024     4:05 PM 12/10/2024     3:44 PM 1/21/2025     2:15 PM   PHQ   PHQ-9 Total Score 5  2 13    Q9: Thoughts of better off dead/self-harm past 2 weeks Not at all Not at all Not at all        Patient-reported    Proxy-reported         10/23/2024     4:05 PM 12/10/2024     3:19 PM 1/21/2025    12:54 PM   JEAN-PAUL-7 SCORE   Total Score 5 (mild anxiety) 2 (minimal anxiety) 14 (moderate anxiety)   Total Score 5  2  14        Patient-reported     The longitudinal plan of care for the diagnosis(es)/condition(s) as documented were addressed during this visit. Due to the added complexity in care, I will continue to support Morro in the subsequent management and with ongoing continuity of care.    Yony Hooker is a 39 year old, presenting for the following health issues:  Follow Up (Anxiety and depression meds. Managing refills questions. Wondering about higher dose of Adderall)        1/21/2025     2:15 PM   Additional Questions   Roomed by Mariam FREEMAN MA         Review of " Systems  As per HPI       Objective           Vitals:  No vitals were obtained today due to virtual visit.    Physical Exam   GENERAL: alert and no distress  EYES: Eyes grossly normal to inspection.  No discharge or erythema, or obvious scleral/conjunctival abnormalities.  RESP: No audible wheeze, cough, or visible cyanosis.    SKIN: Visible skin clear. No significant rash, abnormal pigmentation or lesions.  NEURO: Cranial nerves grossly intact.  Mentation and speech appropriate for age.  PSYCH: Appropriate affect, tone, and pace of words          Video-Visit Details    Type of service:  Video Visit     Video start time: 2:21 pm   Video end time: 2:39 PM     Originating Location (pt. Location): Home    Distant Location (provider location):  On-site  Platform used for Video Visit: Cesario  Signed Electronically by: Yudith James DO

## 2025-01-23 DIAGNOSIS — F90.0 ATTENTION DEFICIT HYPERACTIVITY DISORDER (ADHD), PREDOMINANTLY INATTENTIVE TYPE: ICD-10-CM

## 2025-01-23 RX ORDER — DEXTROAMPHETAMINE SACCHARATE, AMPHETAMINE ASPARTATE MONOHYDRATE, DEXTROAMPHETAMINE SULFATE AND AMPHETAMINE SULFATE 5; 5; 5; 5 MG/1; MG/1; MG/1; MG/1
20 CAPSULE, EXTENDED RELEASE ORAL DAILY
Qty: 30 CAPSULE | Refills: 0 | Status: SHIPPED | OUTPATIENT
Start: 2025-02-01 | End: 2025-01-23

## 2025-02-25 ENCOUNTER — OFFICE VISIT (OUTPATIENT)
Dept: FAMILY MEDICINE | Facility: CLINIC | Age: 40
End: 2025-02-25
Payer: COMMERCIAL

## 2025-02-25 VITALS
SYSTOLIC BLOOD PRESSURE: 110 MMHG | DIASTOLIC BLOOD PRESSURE: 60 MMHG | OXYGEN SATURATION: 98 % | TEMPERATURE: 97.8 F | BODY MASS INDEX: 24.28 KG/M2 | HEART RATE: 70 BPM | WEIGHT: 189.13 LBS | RESPIRATION RATE: 16 BRPM

## 2025-02-25 DIAGNOSIS — F41.1 GAD (GENERALIZED ANXIETY DISORDER): ICD-10-CM

## 2025-02-25 DIAGNOSIS — Z78.9 ALCOHOL USE: ICD-10-CM

## 2025-02-25 DIAGNOSIS — F33.1 MODERATE EPISODE OF RECURRENT MAJOR DEPRESSIVE DISORDER (H): ICD-10-CM

## 2025-02-25 DIAGNOSIS — F90.0 ATTENTION DEFICIT HYPERACTIVITY DISORDER (ADHD), PREDOMINANTLY INATTENTIVE TYPE: Primary | ICD-10-CM

## 2025-02-25 PROCEDURE — 3078F DIAST BP <80 MM HG: CPT | Performed by: STUDENT IN AN ORGANIZED HEALTH CARE EDUCATION/TRAINING PROGRAM

## 2025-02-25 PROCEDURE — 96127 BRIEF EMOTIONAL/BEHAV ASSMT: CPT | Performed by: STUDENT IN AN ORGANIZED HEALTH CARE EDUCATION/TRAINING PROGRAM

## 2025-02-25 PROCEDURE — 99214 OFFICE O/P EST MOD 30 MIN: CPT | Performed by: STUDENT IN AN ORGANIZED HEALTH CARE EDUCATION/TRAINING PROGRAM

## 2025-02-25 PROCEDURE — G2211 COMPLEX E/M VISIT ADD ON: HCPCS | Performed by: STUDENT IN AN ORGANIZED HEALTH CARE EDUCATION/TRAINING PROGRAM

## 2025-02-25 PROCEDURE — 1126F AMNT PAIN NOTED NONE PRSNT: CPT | Performed by: STUDENT IN AN ORGANIZED HEALTH CARE EDUCATION/TRAINING PROGRAM

## 2025-02-25 PROCEDURE — 3074F SYST BP LT 130 MM HG: CPT | Performed by: STUDENT IN AN ORGANIZED HEALTH CARE EDUCATION/TRAINING PROGRAM

## 2025-02-25 RX ORDER — RUXOLITINIB 15 MG/G
CREAM TOPICAL
COMMUNITY
Start: 2025-02-17

## 2025-02-25 RX ORDER — MIRTAZAPINE 15 MG/1
15 TABLET, FILM COATED ORAL AT BEDTIME
Qty: 90 TABLET | Refills: 0 | Status: SHIPPED | OUTPATIENT
Start: 2025-02-25

## 2025-02-25 RX ORDER — DEXTROAMPHETAMINE SACCHARATE, AMPHETAMINE ASPARTATE MONOHYDRATE, DEXTROAMPHETAMINE SULFATE AND AMPHETAMINE SULFATE 3.75; 3.75; 3.75; 3.75 MG/1; MG/1; MG/1; MG/1
15 CAPSULE, EXTENDED RELEASE ORAL DAILY
Qty: 14 CAPSULE | Refills: 0 | Status: SHIPPED | OUTPATIENT
Start: 2025-02-25

## 2025-02-25 ASSESSMENT — PAIN SCALES - GENERAL: PAINLEVEL_OUTOF10: NO PAIN (0)

## 2025-02-25 ASSESSMENT — PATIENT HEALTH QUESTIONNAIRE - PHQ9: SUM OF ALL RESPONSES TO PHQ QUESTIONS 1-9: 19

## 2025-02-25 NOTE — PROGRESS NOTES
"  Assessment & Plan      Diagnosis Comments   1. Attention deficit hyperactivity disorder (ADHD), predominantly inattentive type  amphetamine-dextroamphetamine (ADDERALL XR) 15 MG 24 hr capsule       2. JEAN-PAUL (generalized anxiety disorder)  mirtazapine (REMERON) 15 MG tablet, [place] BEHAV ASSMT W/SCORE & DOCD/STAND INSTRUMENT       3. Moderate episode of recurrent major depressive disorder (H)  PA BEHAV ASSMT W/SCORE & DOCD/STAND INSTRUMENT       4. Alcohol use          ADHD   Alcohol abuse   MDD/JEAN-PAUL  PHQ 9 score 19   JEAN-PAUL 7 score 14   No active SI/HI   The last time we met, (+) breakthrough anxiety and depression symptoms.  Felt hesitant about changing his antidepressants.  Instead, we had opted to increase his Adderall dose to 20 mg XR.    Today, patient reports that the Adderall dose at 20 mg XR is \"too high of a dose\" because it made him tachycardic, he felt like he was going to \"crawl out of his skin\" and felt \"too hyped up\".  Stress levels are still high due to ongoing stressors at his job, had a death in the family since last time I saw him, is changing a new puppy and is overall feeling quite depleted.  His sleep has been quite disruptive and he is feeling like he is running on \"fumes\"  Reports that drinking is continuing to be an issue.  Has had several members of his family at his house in preparation for the upcoming  and they leave alcohol behind.    He has lost weight because he tends to skip meals and then drinks at night to help relax.  He acknowledges that the drinking is affecting his sleep.  Did not quantify specifically how much he is drinking.  Is taking propranolol.  Not sure if it is helping or not  Is continuing on Zoloft 150 mg  Is continuing to be in therapy  No active SI/HI  Plan:  - Reviewed with patient importance of cutting down on alcohol  - Historically, increased dose of Zoloft has not been helpful and neither has Wellbutrin.  Perhaps we can try mirtazapine has lost some weight.  He " is open to that.  -Decrease Adderall  -Continue Zoloft  - Follow-up in 2 months  - Continue therapy    The longitudinal plan of care for the diagnosis(es)/condition(s) as documented were addressed during this visit. Due to the added complexity in care, I will continue to support Morro in the subsequent management and with ongoing continuity of care.              12/10/2024     3:44 PM 1/21/2025     2:15 PM 2/25/2025     1:58 PM   PHQ   PHQ-9 Total Score 2 13  19   Q9: Thoughts of better off dead/self-harm past 2 weeks Not at all Not at all  Not at all       Proxy-reported           10/23/2024     4:05 PM 12/10/2024     3:19 PM 1/21/2025    12:54 PM   JEAN-PAUL-7 SCORE   Total Score 5 (mild anxiety) 2 (minimal anxiety) 14 (moderate anxiety)   Total Score 5  2  14        Patient-reported           Subjective   Morro is a 39 year old, presenting for the following health issues:  Follow Up (Med follow up 1/21/25)        2/25/2025     1:49 PM   Additional Questions   Roomed by Janice MCDONALD MA   Accompanied by Self         Review of Systems  As per HPI       Objective    /60   Pulse 70   Temp 97.8  F (36.6  C) (Oral)   Resp 16   Wt 189 lb 2 oz (85.8 kg)   SpO2 98%   BMI 24.28 kg/m    Body mass index is 24.28 kg/m .  Physical Exam   GENERAL: alert and no distress  MS: no gross musculoskeletal defects noted, no edema  PSYCH: mentation appears normal, affect anxious, circular reasoning, some insight        Signed Electronically by: Yudith James DO

## 2025-03-13 ENCOUNTER — MYC REFILL (OUTPATIENT)
Dept: FAMILY MEDICINE | Facility: CLINIC | Age: 40
End: 2025-03-13
Payer: COMMERCIAL

## 2025-03-13 DIAGNOSIS — F90.0 ATTENTION DEFICIT HYPERACTIVITY DISORDER (ADHD), PREDOMINANTLY INATTENTIVE TYPE: ICD-10-CM

## 2025-03-13 RX ORDER — DEXTROAMPHETAMINE SACCHARATE, AMPHETAMINE ASPARTATE MONOHYDRATE, DEXTROAMPHETAMINE SULFATE AND AMPHETAMINE SULFATE 3.75; 3.75; 3.75; 3.75 MG/1; MG/1; MG/1; MG/1
15 CAPSULE, EXTENDED RELEASE ORAL DAILY
Qty: 14 CAPSULE | Refills: 0 | Status: SHIPPED | OUTPATIENT
Start: 2025-03-13

## 2025-03-20 ENCOUNTER — PATIENT OUTREACH (OUTPATIENT)
Dept: CARE COORDINATION | Facility: CLINIC | Age: 40
End: 2025-03-20
Payer: COMMERCIAL

## 2025-03-31 ENCOUNTER — MYC REFILL (OUTPATIENT)
Dept: FAMILY MEDICINE | Facility: CLINIC | Age: 40
End: 2025-03-31
Payer: COMMERCIAL

## 2025-03-31 DIAGNOSIS — F90.0 ATTENTION DEFICIT HYPERACTIVITY DISORDER (ADHD), PREDOMINANTLY INATTENTIVE TYPE: ICD-10-CM

## 2025-03-31 RX ORDER — DEXTROAMPHETAMINE SACCHARATE, AMPHETAMINE ASPARTATE MONOHYDRATE, DEXTROAMPHETAMINE SULFATE AND AMPHETAMINE SULFATE 3.75; 3.75; 3.75; 3.75 MG/1; MG/1; MG/1; MG/1
15 CAPSULE, EXTENDED RELEASE ORAL DAILY
Qty: 14 CAPSULE | Refills: 0 | Status: SHIPPED | OUTPATIENT
Start: 2025-03-31

## 2025-04-10 ENCOUNTER — MYC REFILL (OUTPATIENT)
Dept: FAMILY MEDICINE | Facility: CLINIC | Age: 40
End: 2025-04-10
Payer: COMMERCIAL

## 2025-04-10 DIAGNOSIS — F90.0 ATTENTION DEFICIT HYPERACTIVITY DISORDER (ADHD), PREDOMINANTLY INATTENTIVE TYPE: ICD-10-CM

## 2025-04-10 RX ORDER — DEXTROAMPHETAMINE SACCHARATE, AMPHETAMINE ASPARTATE MONOHYDRATE, DEXTROAMPHETAMINE SULFATE AND AMPHETAMINE SULFATE 3.75; 3.75; 3.75; 3.75 MG/1; MG/1; MG/1; MG/1
15 CAPSULE, EXTENDED RELEASE ORAL DAILY
Qty: 14 CAPSULE | Refills: 0 | Status: SHIPPED | OUTPATIENT
Start: 2025-04-10

## 2025-05-06 ENCOUNTER — OFFICE VISIT (OUTPATIENT)
Dept: FAMILY MEDICINE | Facility: CLINIC | Age: 40
End: 2025-05-06
Payer: COMMERCIAL

## 2025-05-06 VITALS
RESPIRATION RATE: 14 BRPM | OXYGEN SATURATION: 96 % | SYSTOLIC BLOOD PRESSURE: 128 MMHG | HEIGHT: 74 IN | TEMPERATURE: 98 F | WEIGHT: 190 LBS | DIASTOLIC BLOOD PRESSURE: 80 MMHG | HEART RATE: 74 BPM | BODY MASS INDEX: 24.38 KG/M2

## 2025-05-06 DIAGNOSIS — F33.1 MODERATE EPISODE OF RECURRENT MAJOR DEPRESSIVE DISORDER (H): ICD-10-CM

## 2025-05-06 DIAGNOSIS — M25.531 RIGHT WRIST PAIN: ICD-10-CM

## 2025-05-06 DIAGNOSIS — E55.9 VITAMIN D DEFICIENCY: ICD-10-CM

## 2025-05-06 DIAGNOSIS — F10.90 ALCOHOL USE: ICD-10-CM

## 2025-05-06 DIAGNOSIS — F90.0 ATTENTION DEFICIT HYPERACTIVITY DISORDER (ADHD), PREDOMINANTLY INATTENTIVE TYPE: ICD-10-CM

## 2025-05-06 DIAGNOSIS — D64.9 ANEMIA, UNSPECIFIED TYPE: ICD-10-CM

## 2025-05-06 DIAGNOSIS — Z00.00 ROUTINE GENERAL MEDICAL EXAMINATION AT A HEALTH CARE FACILITY: Primary | ICD-10-CM

## 2025-05-06 DIAGNOSIS — F41.1 GAD (GENERALIZED ANXIETY DISORDER): ICD-10-CM

## 2025-05-06 LAB
ALBUMIN SERPL BCG-MCNC: 4.8 G/DL (ref 3.5–5.2)
ALP SERPL-CCNC: 44 U/L (ref 40–150)
ALT SERPL W P-5'-P-CCNC: 13 U/L (ref 0–70)
AMPHETAMINES UR QL SCN: ABNORMAL
ANION GAP SERPL CALCULATED.3IONS-SCNC: 9 MMOL/L (ref 7–15)
AST SERPL W P-5'-P-CCNC: 21 U/L (ref 0–45)
BARBITURATES UR QL SCN: ABNORMAL
BENZODIAZ UR QL SCN: ABNORMAL
BILIRUB SERPL-MCNC: 0.3 MG/DL
BUN SERPL-MCNC: 15.3 MG/DL (ref 6–20)
BZE UR QL SCN: ABNORMAL
CALCIUM SERPL-MCNC: 9.5 MG/DL (ref 8.8–10.4)
CANNABINOIDS UR QL SCN: ABNORMAL
CHLORIDE SERPL-SCNC: 105 MMOL/L (ref 98–107)
CHOLEST SERPL-MCNC: 211 MG/DL
CREAT SERPL-MCNC: 0.99 MG/DL (ref 0.67–1.17)
EGFRCR SERPLBLD CKD-EPI 2021: >90 ML/MIN/1.73M2
ERYTHROCYTE [DISTWIDTH] IN BLOOD BY AUTOMATED COUNT: 12.5 % (ref 10–15)
EST. AVERAGE GLUCOSE BLD GHB EST-MCNC: 111 MG/DL
FASTING STATUS PATIENT QL REPORTED: YES
FASTING STATUS PATIENT QL REPORTED: YES
FENTANYL UR QL: ABNORMAL
GLUCOSE SERPL-MCNC: 96 MG/DL (ref 70–99)
HBA1C MFR BLD: 5.5 % (ref 0–5.6)
HCO3 SERPL-SCNC: 25 MMOL/L (ref 22–29)
HCT VFR BLD AUTO: 37.5 % (ref 40–53)
HDLC SERPL-MCNC: 46 MG/DL
HGB BLD-MCNC: 13.1 G/DL (ref 13.3–17.7)
LDLC SERPL CALC-MCNC: 152 MG/DL
MCH RBC QN AUTO: 28.7 PG (ref 26.5–33)
MCHC RBC AUTO-ENTMCNC: 34.9 G/DL (ref 31.5–36.5)
MCV RBC AUTO: 82 FL (ref 78–100)
NONHDLC SERPL-MCNC: 165 MG/DL
OPIATES UR QL SCN: ABNORMAL
PCP QUAL URINE (ROCHE): ABNORMAL
PLATELET # BLD AUTO: 238 10E3/UL (ref 150–450)
POTASSIUM SERPL-SCNC: 4.3 MMOL/L (ref 3.4–5.3)
PROT SERPL-MCNC: 7 G/DL (ref 6.4–8.3)
RBC # BLD AUTO: 4.56 10E6/UL (ref 4.4–5.9)
SODIUM SERPL-SCNC: 139 MMOL/L (ref 135–145)
TRIGL SERPL-MCNC: 65 MG/DL
TSH SERPL DL<=0.005 MIU/L-ACNC: 1.34 UIU/ML (ref 0.3–4.2)
VIT D+METAB SERPL-MCNC: 19 NG/ML (ref 20–50)
WBC # BLD AUTO: 4 10E3/UL (ref 4–11)

## 2025-05-06 RX ORDER — DEXTROAMPHETAMINE SACCHARATE, AMPHETAMINE ASPARTATE MONOHYDRATE, DEXTROAMPHETAMINE SULFATE AND AMPHETAMINE SULFATE 3.75; 3.75; 3.75; 3.75 MG/1; MG/1; MG/1; MG/1
15 CAPSULE, EXTENDED RELEASE ORAL DAILY
Qty: 30 CAPSULE | Refills: 0 | Status: SHIPPED | OUTPATIENT
Start: 2025-05-06

## 2025-05-06 RX ORDER — PROPRANOLOL HYDROCHLORIDE 60 MG/1
60 CAPSULE, EXTENDED RELEASE ORAL DAILY
Qty: 90 CAPSULE | Refills: 3 | Status: SHIPPED | OUTPATIENT
Start: 2025-05-06

## 2025-05-06 SDOH — HEALTH STABILITY: PHYSICAL HEALTH: ON AVERAGE, HOW MANY DAYS PER WEEK DO YOU ENGAGE IN MODERATE TO STRENUOUS EXERCISE (LIKE A BRISK WALK)?: 1 DAY

## 2025-05-06 ASSESSMENT — ANXIETY QUESTIONNAIRES
7. FEELING AFRAID AS IF SOMETHING AWFUL MIGHT HAPPEN: NOT AT ALL
1. FEELING NERVOUS, ANXIOUS, OR ON EDGE: SEVERAL DAYS
GAD7 TOTAL SCORE: 2
7. FEELING AFRAID AS IF SOMETHING AWFUL MIGHT HAPPEN: NOT AT ALL
GAD7 TOTAL SCORE: 2
3. WORRYING TOO MUCH ABOUT DIFFERENT THINGS: NOT AT ALL
IF YOU CHECKED OFF ANY PROBLEMS ON THIS QUESTIONNAIRE, HOW DIFFICULT HAVE THESE PROBLEMS MADE IT FOR YOU TO DO YOUR WORK, TAKE CARE OF THINGS AT HOME, OR GET ALONG WITH OTHER PEOPLE: SOMEWHAT DIFFICULT
4. TROUBLE RELAXING: NOT AT ALL
GAD7 TOTAL SCORE: 2
2. NOT BEING ABLE TO STOP OR CONTROL WORRYING: NOT AT ALL
8. IF YOU CHECKED OFF ANY PROBLEMS, HOW DIFFICULT HAVE THESE MADE IT FOR YOU TO DO YOUR WORK, TAKE CARE OF THINGS AT HOME, OR GET ALONG WITH OTHER PEOPLE?: SOMEWHAT DIFFICULT
5. BEING SO RESTLESS THAT IT IS HARD TO SIT STILL: NOT AT ALL
6. BECOMING EASILY ANNOYED OR IRRITABLE: SEVERAL DAYS

## 2025-05-06 ASSESSMENT — PAIN SCALES - GENERAL: PAINLEVEL_OUTOF10: NO PAIN (0)

## 2025-05-06 ASSESSMENT — SOCIAL DETERMINANTS OF HEALTH (SDOH): HOW OFTEN DO YOU GET TOGETHER WITH FRIENDS OR RELATIVES?: ONCE A WEEK

## 2025-05-06 NOTE — LETTER
[facility]  05/06/25  Patient: Morro Coronel  YOB: 1985  Medical Record Number: [id number]                                                                                  Non-Opioid Controlled Substance Agreement    This is an agreement between you and your provider regarding safe and appropriate use of controlled substances prescribed by your care team. Controlled substances are?medicines that can cause physical and mental dependence (abuse).     There are strict laws about having and using these medicines. We here at [facility] are  committed to working with you in your efforts to get better. To support you in this work, we'll help you schedule regular office appointments for medicine refills. If we must cancel or change your appointment for any reason, we'll make sure you have enough medicine to last until your next appointment.     As a Provider, I will:   Listen carefully to your concerns while treating you with respect.   Recommend a treatment plan that I believe is in your best interest and may involve therapies other than medicine.    Talk with you often about the possible benefits and the risk of harm of any medicine that we prescribe for you.  Assess the safety of this medicine and check how well it works.    Provide a plan on how to taper (discontinue or go off) using this medicine if the decision is made to stop its use.      ::  As a Patient, I understand controlled substances:     Are prescribed by my care provider to help me function or work and manage my condition(s).?  Are strong medicines and can cause serious side effects.     Need to be taken exactly as prescribed.?Combining controlled substances with certain medicines or chemicals (such as illegal drugs, alcohol, sedatives, sleeping pills, and benzodiazepines) can be dangerous or even fatal.? If I stop taking my medicines suddenly, I may have severe withdrawal symptoms.     The risks,  benefits, and side effects of these medicine(s) were explained to me. I agree that:    I will take part in other treatments as advised by my care team. This may be psychiatry or counseling, physical therapy, behavioral therapy, group treatment or a referral to specialist.    I will keep all my appointments and understand this is part of the monitoring of controlled substances.?My care team may require an office visit for EVERY controlled substance refill. If I miss appointments or don t follow instructions, my care team may stop my medicine    I will take my medicines as prescribed. I will not change the dose or schedule unless my care team tells me to. There will be no refills if I run out early.      I may be asked to come to the clinic and complete a urine drug test or complete a pill count. If I don t give a urine sample or participate in a pill count, the care team may stop my medicine.    I will only receive controlled substance prescriptions from this clinic. If I am treated by another provider, I will tell them that I am taking controlled substances and that I have a treatment agreement with this provider. I will inform my [facility] care team within one business day if I am given a prescription for any controlled substance by another healthcare provider. My [facility] care team can contact other providers and pharmacists about my use of any medicines.    It is up to me to make sure that I don't run out of my medicines on weekends or holidays.?If my care team is willing to refill my prescription without a visit, I must request refills only during office hours. Refills may take up to 3 business days to process. I will use one pharmacy to fill all my controlled substance prescriptions. I will notify the clinic about any changes to my insurance or medicine availability.    I am responsible for my prescriptions. If the medicine/prescription is lost, stolen or destroyed, it will not be replaced.?I  also agree not to share controlled substance medicines with anyone.     I am aware I should not use any illegal or recreational drugs. I agree not to drink alcohol unless my care team says I can.     If I enroll in the [place] [facility] Cannabis program, I will tell my care team before my next refill.    I will tell my care team right away if I become pregnant, have a new medical problem treated outside of my regular clinic, or have a change in my medicines.     I understand that this medicine can affect my thinking, judgment and reaction time.? Alcohol and drugs affect the brain and body, which can affect the safety of my driving. Being under the influence of alcohol or drugs can affect my decision-making, behaviors, personal safety and the safety of others. Driving while impaired (DWI) can occur if a person is driving, operating or in physical control of a car, motorcycle, boat, snowmobile, ATV, motorbike, off-road vehicle or any other motor vehicle ([place] Statute 169A.20). I understand the risk if I choose to drive or operate any vehicle or machinery.    I understand that if I do not follow any of the conditions above, my prescriptions or treatment may be stopped or changed.   I agree that my provider, clinic care team and pharmacy may work with any city, state or federal law enforcement agency that investigates the misuse, sale or other diversion of my controlled medicine. I will allow my provider to discuss my care with, or share a copy of, this agreement with any other treating provider, pharmacy or emergency room where I receive care.     I have read this agreement and have asked questions about anything I did not understand.    ________________________________________________________  Patient Signature - Morro Coronel     ___________________                   Date     ________________________________________________________  Provider Signature - Yudith James, DO       ___________________                    Date     ________________________________________________________  Witness Signature (required if provider not present while patient signing)          ___________________                   Date

## 2025-05-06 NOTE — PATIENT INSTRUCTIONS
Patient Education   Preventive Care Advice   This is general advice given by our system to help you stay healthy. However, your care team may have specific advice just for you. Please talk to your care team about your preventive care needs.  Nutrition  Eat 5 or more servings of fruits and vegetables each day.  Try wheat bread, brown rice and whole grain pasta (instead of white bread, rice, and pasta).  Get enough calcium and vitamin D. Check the label on foods and aim for 100% of the RDA (recommended daily allowance).  Lifestyle  Exercise at least 150 minutes each week  (30 minutes a day, 5 days a week).  Do muscle strengthening activities 2 days a week. These help control your weight and prevent disease.  No smoking.  Wear sunscreen to prevent skin cancer.  Have a dental exam and cleaning every 6 months.  Yearly exams  See your health care team every year to talk about:  Any changes in your health.  Any medicines your care team has prescribed.  Preventive care, family planning, and ways to prevent chronic diseases.  Shots (vaccines)   HPV shots (up to age 26), if you've never had them before.  Hepatitis B shots (up to age 59), if you've never had them before.  COVID-19 shot: Get this shot when it's due.  Flu shot: Get a flu shot every year.  Tetanus shot: Get a tetanus shot every 10 years.  Pneumococcal, hepatitis A, and RSV shots: Ask your care team if you need these based on your risk.  Shingles shot (for age 50 and up)  General health tests  Diabetes screening:  Starting at age 35, Get screened for diabetes at least every 3 years.  If you are younger than age 35, ask your care team if you should be screened for diabetes.  Cholesterol test: At age 39, start having a cholesterol test every 5 years, or more often if advised.  Bone density scan (DEXA): At age 50, ask your care team if you should have this scan for osteoporosis (brittle bones).  Hepatitis C: Get tested at least once in your life.  STIs (sexually  transmitted infections)  Before age 24: Ask your care team if you should be screened for STIs.  After age 24: Get screened for STIs if you're at risk. You are at risk for STIs (including HIV) if:  You are sexually active with more than one person.  You don't use condoms every time.  You or a partner was diagnosed with a sexually transmitted infection.  If you are at risk for HIV, ask about PrEP medicine to prevent HIV.  Get tested for HIV at least once in your life, whether you are at risk for HIV or not.  Cancer screening tests  Cervical cancer screening: If you have a cervix, begin getting regular cervical cancer screening tests starting at age 21.  Breast cancer scan (mammogram): If you've ever had breasts, begin having regular mammograms starting at age 40. This is a scan to check for breast cancer.  Colon cancer screening: It is important to start screening for colon cancer at age 45.  Have a colonoscopy test every 10 years (or more often if you're at risk) Or, ask your provider about stool tests like a FIT test every year or Cologuard test every 3 years.  To learn more about your testing options, visit:   .  For help making a decision, visit:   https://bit.ly/db21670.  Prostate cancer screening test: If you have a prostate, ask your care team if a prostate cancer screening test (PSA) at age 55 is right for you.  Lung cancer screening: If you are a current or former smoker ages 50 to 80, ask your care team if ongoing lung cancer screenings are right for you.  For informational purposes only. Not to replace the advice of your health care provider. Copyright   2023 [facility]. All rights reserved. Clinically reviewed by the [facility]. Imagga [id number] - REV 01/24.  Learning About Stress  What is stress?     Stress is your body's response to a hard situation. Your body can have a physical, emotional, or mental response. Stress is a fact of life for most people, and it  affects everyone differently. What causes stress for you may not be stressful for someone else.  A lot of things can cause stress. You may feel stress when you go on a job interview, take a test, or run a race. This kind of short-term stress is normal and even useful. It can help you if you need to work hard or react quickly. For example, stress can help you finish an important job on time.  Long-term stress is caused by ongoing stressful situations or events. Examples of long-term stress include long-term health problems, ongoing problems at work, or conflicts in your family. Long-term stress can harm your health.  How does stress affect your health?  When you are stressed, your body responds as though you are in danger. It makes hormones that speed up your heart, make you breathe faster, and give you a burst of energy. This is called the fight-or-flight stress response. If the stress is over quickly, your body goes back to normal and no harm is done.  But if stress happens too often or lasts too long, it can have bad effects. Long-term stress can make you more likely to get sick, and it can make symptoms of some diseases worse. If you tense up when you are stressed, you may develop neck, shoulder, or low back pain. Stress is linked to high blood pressure and heart disease.  Stress also harms your emotional health. It can make you robertson, tense, or depressed. Your relationships may suffer, and you may not do well at work or school.  What can you do to manage stress?  You can try these things to help manage stress:   Do something active. Exercise or activity can help reduce stress. Walking is a great way to get started. Even everyday activities such as housecleaning or yard work can help.  Try yoga or eber chi. These techniques combine exercise and meditation. You may need some training at first to learn them.  Do something you enjoy. For example, listen to music or go to a movie. Practice your hobby or do volunteer  "work.  Meditate. This can help you relax, because you are not worrying about what happened before or what may happen in the future.  Do guided imagery. Imagine yourself in any setting that helps you feel calm. You can use online videos, books, or a teacher to guide you.  Do breathing exercises. For example:  From a standing position, bend forward from the waist with your knees slightly bent. Let your arms dangle close to the floor.  Breathe in slowly and deeply as you return to a standing position. Roll up slowly and lift your head last.  Hold your breath for just a few seconds in the standing position.  Breathe out slowly and bend forward from the waist.  Let your feelings out. Talk, laugh, cry, and express anger when you need to. Talking with supportive friends or family, a counselor, or a scott leader about your feelings is a healthy way to relieve stress. Avoid discussing your feelings with people who make you feel worse.  Write. It may help to write about things that are bothering you. This helps you find out how much stress you feel and what is causing it. When you know this, you can find better ways to cope.  What can you do to prevent stress?  You might try some of these things to help prevent stress:  Manage your time. This helps you find time to do the things you want and need to do.  Get enough sleep. Your body recovers from the stresses of the day while you are sleeping.  Get support. Your family, friends, and community can make a difference in how you experience stress.  Limit your news feed. Avoid or limit time on social media or news that may make you feel stressed.  Do something active. Exercise or activity can help reduce stress. Walking is a great way to get started.  Where can you learn more?  Go to https://www.HelpHub.net/patiented  Enter N032 in the search box to learn more about \"Learning About Stress.\"  Current as of: October 24, 2024  Content Version: 14.4 2024-2025 [employer]   Care instructions adapted under license by your healthcare professional. If you have questions about a medical condition or this instruction, always ask your healthcare professional. [employer] disclaims any warranty or liability for your use of this information.    Learning About Depression Screening  What is depression screening?  Depression screening is a way to see if you have depression symptoms. It may be done by a doctor or counselor. It's often part of a routine checkup. That's because your mental health is just as important as your physical health.  Depression is a mental health condition that affects how you feel, think, and act. You may:  Have less energy.  Lose interest in your daily activities.  Feel sad and grouchy for a long time.  Depression is very common. It affects people of all ages.  Many things can lead to depression. Some people become depressed after they have a stroke or find out they have a major illness like cancer or heart disease. The death of a loved one or a breakup may lead to depression. It can run in families. Most experts believe that a combination of inherited genes and stressful life events can cause it.  What happens during screening?  You may be asked to fill out a form about your depression symptoms. You and the doctor will discuss your answers. The doctor may ask you more questions to learn more about how you think, act, and feel.  What happens after screening?  If you have symptoms of depression, your doctor will talk to you about your options.  Doctors usually treat depression with medicines or counseling. Often, combining the two works best. Many people don't get help because they think that they'll get over the depression on their own. But people with depression may not get better unless they get treatment.  The cause of depression is not well understood. There may be many factors involved. But if you have depression, it's not your fault.  A serious  "symptom of depression is thinking about death or suicide. If you or someone you care about talks about this or about feeling hopeless, get help right away.  It's important to know that depression can be treated. Medicine, counseling, and self-care may help.  Where can you learn more?  Go to https://www.Ntirety.net/patiented  Enter T185 in the search box to learn more about \"Learning About Depression Screening.\"  Current as of: July 31, 2024  Content Version: 14.4    0948-4512 [employer].   Care instructions adapted under license by your healthcare professional. If you have questions about a medical condition or this instruction, always ask your healthcare professional. [employer] disclaims any warranty or liability for your use of this information.    9 Ways to Cut Back on Drinking  Maybe you've found yourself drinking more alcohol than you'd prefer. If you want to cut back, here are some ideas to try.    Think before you drink.  Do you really want a drink, or is it just a habit? If you're used to having a drink at a certain time, try doing something else then.     Look for substitutes.  Find some no-alcohol drinks that you enjoy, like flavored seltzer water, tea with honey, or tonic with a slice of lime. Or try alcohol-free beer or \"virgin\" cocktails (without the alcohol).     Drink more water.  Use water to quench your thirst. Drink a glass of water before you have any alcohol. Have another glass along with every drink or between drinks.     Shrink your drink.  For example, have a bottle of beer instead of a pint. Use a smaller glass for wine. Choose drinks with lower alcohol content (ABV%). Or use less liquor and more mixer in cocktails.     Slow down.  It's easy to drink quickly and without thinking about it. Pay attention, and make each drink last longer.     Do the math.  Total up how much you spend on alcohol each month. How much is that a year? If you cut back, what could you do " "with the money you save?     Take a break.  Choose a day or two each week when you won't drink at all. Notice how you feel on those days, physically and emotionally. How did you sleep? Do you feel better? Over time, add more break days.     Count calories.  Would you like to lose some weight? For some people that's a good motivator for cutting back. Figure out how many calories are in each drink. How many does that add up to in a day? In a week? In a month?     Practice saying no.  Be ready when someone offers you a drink. Try: \"Thanks, I've had enough.\" Or \"Thanks, but I'm cutting back.\" Or \"No, thanks. I feel better when I drink less.\"   Current as of: August 20, 2024  Content Version: 14.4    6816-3720 [employer].   Care instructions adapted under license by your healthcare professional. If you have questions about a medical condition or this instruction, always ask your healthcare professional. [employer] disclaims any warranty or liability for your use of this information.     "

## 2025-05-06 NOTE — PROGRESS NOTES
Preventive Care Visit  [facility]  May 6, 2025      Assessment & Plan      Diagnosis Comments   1. Routine general medical examination at a health care facility  TSH with free T4 reflex, CBC with platelets, Comprehensive metabolic panel, Lipid Profile, Hemoglobin A1c, Vitamin D Deficiency       2. Attention deficit hyperactivity disorder (ADHD), predominantly inattentive type  amphetamine-dextroamphetamine (ADDERALL XR) 15 MG 24 hr capsule, Urine Drug Screen       3. Moderate episode of recurrent major depressive disorder (H)  propranolol ER (INDERAL LA) 60 MG 24 hr capsule, MO BEHAV ASSMT W/SCORE & DOCD/STAND INSTRUMENT       4. JEAN-PAUL (generalized anxiety disorder)  propranolol ER (INDERAL LA) 60 MG 24 hr capsule, MO BEHAV ASSMT W/SCORE & DOCD/STAND INSTRUMENT       5. Vitamin D deficiency        6. Right wrist pain        7. Alcohol use              Patient is a 37-year-old male with PMH of alcohol use, eczema, anxiety who presents today for an annual physical.     Home life wife + kids   Occupation:stable but busy.  Instead like season further work and he stressed about that.  Sexually active: Yes  Safety concerns:no  Diet/exercise: diet is poor recently 2/2 busy schedule   Family history of cancers:no   Eye exam/dental exam:needs updated eye exam   Alcohol use:see discussion below   Tobacco use/marijuana use/drug use: none    Mental health:see discussion below   Vaccines: Ordered hep B  Blood work:ordered      Too young for any of the cancer screenings     Alcohol abuse  MDD/JEAN-PAUL  PHQ-9 score 6  JEAN-PAUL-7 score 2  No active SI/HI  Last time patient was seen, he was struggling with stress management.  There have been an increase in his alcohol use.  He felt that his dose of Adderall was too high.  Was having a lot of breakthrough symptoms of both depression or anxiety.  We decreased Adderall dose to 15 mg, added mirtazapine and advised to continue therapy.   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  It has been overwhelming and he is trying to come up with the plan on how to manage the next few months before he hire somebody new.  The dog is a bit more trained than last time but still taking up a lot of his time  He is also been feeling down because he feels that his physical health is suffering.  He is not exercising as much as he used to do it feels unhappy about that  He is starting to get into a pattern where he is drinking a few times a week for stress management.  He is hoping to stop buying alcohol altogether and see if that will decrease overall intake  He did start mirtazapine last time but stopped it after a week because it made him too drowsy.  He would take it around 9 or 10 PM.  He is not sure if it helped with his mood.  The lower dose of Adderall is better but he still feels like it might be \"too high\"  Plan:  - Supportive listening provided  - Patient continues to have a lot of breakthrough symptoms today  - Reviewed with him that mirtazapine may have been drowsy and because he took it too late in the night.  Perhaps we could retrial it but have him take it earlier in the evening.  He is amenable.  He could decrease the dose to 7.5 and see if that would make him less drowsy.  He says he will try it at home and let me know  - Will continue current dose of Zoloft at 150 mg  - Continue propranolol 60 mg daily  - We can certainly go down on his Adderall dose but I do not want to make too many med changes at the same time.  He is amenable to continuing on the current dose and we can revisit this at the next visit  - Continue therapy  -She has a plan to cut down on alcohol consumption.  Will hold on starting acamprosate.  Historically has not done well with naltrexone.  - Follow-up in 2 to 3 months          1/21/2025     2:15 PM 2/25/2025     1:58 PM 5/6/2025     9:09 AM   PHQ   PHQ-9 Total " Score 13  19 6    Q9: Thoughts of better off dead/self-harm past 2 weeks Not at all  Not at all Not at all       Patient-reported    Proxy-reported             12/10/2024     3:19 PM 1/21/2025    12:54 PM 5/6/2025     9:10 AM   JEAN-PAUL-7 SCORE   Total Score 2 (minimal anxiety) 14 (moderate anxiety) 2 (minimal anxiety)   Total Score 2  14  2        Patient-reported        33 minutes in addition to the annual preventative spent on the date of the encounter doing chart review, history and exam, documentation and further activities per the note      Patient has been advised of split billing requirements and indicates understanding: Yes    The longitudinal plan of care for the diagnosis(es)/condition(s) as documented were addressed during this visit. Due to the added complexity in care, I will continue to support [place] in the subsequent management and with ongoing continuity of care.      Counseling  Appropriate preventive services were addressed with this patient via screening, questionnaire, or discussion as appropriate for fall prevention, nutrition, physical activity, Tobacco-use cessation, social engagement, weight loss and cognition.  Checklist reviewing preventive services available has been given to the patient.  Reviewed patient's diet, addressing concerns and/or questions.   He is at risk for lack of exercise and has been provided with information to increase physical activity for the benefit of his well-being.   He is at risk for psychosocial distress and has been provided with information to reduce risk.   The patient reports drinking more than 3 alcoholic drinks per day and/or more than 7 drhnks per week. The patient was counseled and given information about possible harmful effects of excessive alcohol intake.The patient's PHQ-9 score is consistent with mild depression. He was provided with information regarding depression.       Yony Hooker is a 40 year old, presenting for the following:  Physical (Pt  is fasting, med check but no new concerns )        5/6/2025     9:14 AM   Additional Questions   Roomed by Sangeeta WOMACK    Advance Care Planning  Discussed advance care planning with patient; informed AVS has link to [facility].        5/6/2025   General Health   How would you rate your overall physical health? (!) FAIR   Feel stress (tense, anxious, or unable to sleep) To some extent   (!) STRESS CONCERN      5/6/2025   Nutrition   Three or more servings of calcium each day? (!) NO   Diet: Regular (no restrictions)   How many servings of fruit and vegetables per day? (!) 0-1   How many sweetened beverages each day? 0-1         5/6/2025   Exercise   Days per week of moderate/strenous exercise 1 day   (!) EXERCISE CONCERN      5/6/2025   Social Factors   Frequency of gathering with friends or relatives Once a week   Worry food won't last until get money to buy more No   Food not last or not have enough money for food? No   Do you have housing? (Housing is defined as stable permanent housing and does not include staying outside in a car, in a tent, in an abandoned building, in an overnight shelter, or couch-surfing.) Yes   Are you worried about losing your housing? No   Lack of transportation? No   Unable to get utilities (heat,electricity)? No         5/6/2025   Dental   Dentist two times every year? Yes       Today's PHQ-9 Score:       5/6/2025     9:09 AM   PHQ-9 SCORE   PHQ-9 Total Score MyChart 6 (Mild depression)   PHQ-9 Total Score 6        Patient-reported         5/6/2025   Substance Use   Alcohol more than 3/day or more than 7/wk Yes   How often do you have a drink containing alcohol 4 or more times a week   How many alcohol drinks on typical day 1 or 2   How often do you have 5+ drinks at one occasion Less than monthly   Audit 2/3 Score 1   How often not able to stop drinking once started Less than monthly   How often failed to do what normally expected Less than monthly   How often needed  first drink in am after a heavy drinking session Never   How often feeling of guilt or remorse after drinking Weekly   How often unable to remember what happened the night before Never   Have you or someone else been injured because of your drinking No   Has anyone been concerned or suggested you cut down on drinking Yes, during the last year   TOTAL SCORE - AUDIT 14   Do you use any other substances recreationally? No     Social History     Tobacco Use    Smoking status: Never     Passive exposure: Never    Smokeless tobacco: Never   Vaping Use    Vaping status: Never Used   Substance Use Topics    Alcohol use: Yes     Alcohol/week: 11.0 standard drinks of alcohol     Comment: 1-3 drinks/week    Drug use: No             5/6/2025   One time HIV Screening   Previous HIV test? No         5/6/2025   STI Screening   New sexual partner(s) since last STI/HIV test? No   ASCVD Risk   The 10-year ASCVD risk score (Manny HUFF, et al., 2019) is: 1.4%    Values used to calculate the score:      Age: 40 years      Sex: Male      Is Non- : No      Diabetic: No      Tobacco smoker: No      Systolic Blood Pressure: 128 mmHg      Is BP treated: No      HDL Cholesterol: 46 mg/dL      Total Cholesterol: 211 mg/dL        5/6/2025   Contraception/Family Planning   Questions about contraception or family planning No       Reviewed and updated as needed this visit by Provider   Tobacco  Allergies  Meds  Problems  Med Hx  Surg Hx  Fam Hx            Past Medical History:   Diagnosis Date    Atopic dermatitis     left arm, hands, eyelids    Seasonal allergies      Past Surgical History:   Procedure Laterality Date    thumb surgery      as a young child     OB History   No obstetric history on file.     Lab work is in process  Labs reviewed in EPIC  BP Readings from Last 3 Encounters:   05/06/25 128/80   02/25/25 110/60   07/23/24 108/60    Wt Readings from Last 3 Encounters:   05/06/25 86.2 kg (190 lb)    02/25/25 85.8 kg (189 lb 2 oz)   07/23/24 87.6 kg (193 lb 1.6 oz)                  Patient Active Problem List   Diagnosis    CARDIOVASCULAR SCREENING; LDL GOAL LESS THAN 160    Alcohol use    Eczema, unspecified type    Anxiety    Moderate episode of recurrent major depressive disorder (H)    ADHD (attention deficit hyperactivity disorder)     Past Surgical History:   Procedure Laterality Date    thumb surgery      as a young child       Social History     Tobacco Use    Smoking status: Never     Passive exposure: Never    Smokeless tobacco: Never   Substance Use Topics    Alcohol use: Yes     Alcohol/week: 11.0 standard drinks of alcohol     Comment: 1-3 drinks/week     Family History   Problem Relation Age of Onset    Hypertension Father     Depression Father     Diabetes Maternal Grandmother     Coronary Artery Disease Paternal Grandmother     Attention Deficit Disorder Brother     Diabetes Maternal Uncle     Coronary Artery Disease Paternal Uncle          Current Outpatient Medications   Medication Sig Dispense Refill    amphetamine-dextroamphetamine (ADDERALL XR) 15 MG 24 hr capsule Take 1 capsule (15 mg) by mouth daily. 30 capsule 0    cetirizine (ZYRTEC) 10 MG tablet Take 10 mg by mouth daily as needed.      clobetasol (TEMOVATE) 0.05 % external ointment       fluticasone (FLONASE) 50 MCG/ACT nasal spray Spray 1 spray into both nostrils as needed      hydrOXYzine HCl (ATARAX) 25 MG tablet Take 1 tablet (25 mg) by mouth nightly as needed for itching. 60 tablet 0    mupirocin (BACTROBAN) 2 % external ointment       OPZELURA 1.5 % CREA       propranolol ER (INDERAL LA) 60 MG 24 hr capsule Take 1 capsule (60 mg) by mouth daily. 90 capsule 3    sertraline (ZOLOFT) 100 MG tablet Take 1 tablet (100 mg) by mouth daily 90 tablet 3    sertraline (ZOLOFT) 50 MG tablet Take 1 tablet (50 mg) by mouth daily. 90 tablet 1    mirtazapine (REMERON) 15 MG tablet Take 1 tablet (15 mg) by mouth at bedtime. (Patient not taking:  "Reported on 5/6/2025) 90 tablet 0     Allergies   Allergen Reactions    No Known Allergies      Recent Labs   Lab Test 05/06/25  1033 04/19/24  1653 10/18/23  0943 06/07/23  0854 03/03/23  1127   A1C 5.5 5.4 5.6   < > 5.7*   * 171*  --   --  143*   HDL 46 40  --   --  54   TRIG 65 110  --   --  79   ALT 13 17 24  --  17   CR 0.99 0.96 0.97  --  0.94   GFRESTIMATED >90 >90 >90  --  >90   POTASSIUM 4.3 4.0 4.5  --  4.3   TSH 1.34 2.17  --   --  2.35    < > = values in this interval not displayed.               Review of Systems  Constitutional, neuro, ENT, endocrine, pulmonary, cardiac, gastrointestinal, genitourinary, musculoskeletal, integument and psychiatric systems are negative, except as otherwise noted.     Objective    Exam  /80   Pulse 74   Temp 98  F (36.7  C) (Oral)   Resp 14   Ht 1.885 m (6' 2.21\")   Wt 86.2 kg (190 lb)   SpO2 96%   BMI 24.25 kg/m     Estimated body mass index is 24.25 kg/m  as calculated from the following:    Height as of this encounter: 1.885 m (6' 2.21\").    Weight as of this encounter: 86.2 kg (190 lb).    Physical Exam  GENERAL: alert and no distress  EYES: Eyes grossly normal to inspection, and conjunctivae and sclerae normal  HENT: ear canals and TM's normal, nose and mouth without ulcers or lesions  NECK: no adenopathy, no asymmetry, masses, or scars  RESP: lungs clear to auscultation - no rales, rhonchi or wheezes  CV: regular rate and rhythm, no murmur, click or rub, no peripheral edema  ABDOMEN: soft, nontender, no hepatosplenomegaly, no masses and bowel sounds normal  MS: no gross musculoskeletal defects noted, no edema  PSYCH: mentation appears normal, affect anxious, overall approriate        Signed Electronically by: Yudith James DO      "

## 2025-05-07 ENCOUNTER — RESULTS FOLLOW-UP (OUTPATIENT)
Dept: FAMILY MEDICINE | Facility: CLINIC | Age: 40
End: 2025-05-07
Payer: COMMERCIAL

## 2025-05-07 DIAGNOSIS — D64.9 ANEMIA, UNSPECIFIED TYPE: Primary | ICD-10-CM

## 2025-05-07 LAB
FERRITIN SERPL-MCNC: 178 NG/ML (ref 31–409)
IRON BINDING CAPACITY (ROCHE): 238 UG/DL (ref 240–430)
IRON SATN MFR SERPL: 43 % (ref 15–46)
IRON SERPL-MCNC: 103 UG/DL (ref 61–157)

## 2025-05-12 ENCOUNTER — TRANSFERRED RECORDS (OUTPATIENT)
Dept: HEALTH INFORMATION MANAGEMENT | Facility: CLINIC | Age: 40
End: 2025-05-12
Payer: COMMERCIAL

## 2025-05-13 ENCOUNTER — TRANSFERRED RECORDS (OUTPATIENT)
Dept: HEALTH INFORMATION MANAGEMENT | Facility: CLINIC | Age: 40
End: 2025-05-13
Payer: COMMERCIAL

## 2025-05-13 ENCOUNTER — TRANSCRIBE ORDERS (OUTPATIENT)
Dept: OTHER | Age: 40
End: 2025-05-13

## 2025-05-13 DIAGNOSIS — M25.539 WRIST JOINT PAIN: Primary | ICD-10-CM

## 2025-05-14 ENCOUNTER — PATIENT OUTREACH (OUTPATIENT)
Dept: CARE COORDINATION | Facility: CLINIC | Age: 40
End: 2025-05-14
Payer: COMMERCIAL

## 2025-05-21 ENCOUNTER — ORDERS ONLY (AUTO-RELEASED) (OUTPATIENT)
Dept: FAMILY MEDICINE | Facility: CLINIC | Age: 40
End: 2025-05-21
Payer: COMMERCIAL

## 2025-05-21 DIAGNOSIS — D64.9 ANEMIA, UNSPECIFIED TYPE: ICD-10-CM

## 2025-06-17 ENCOUNTER — MYC REFILL (OUTPATIENT)
Dept: FAMILY MEDICINE | Facility: CLINIC | Age: 40
End: 2025-06-17
Payer: COMMERCIAL

## 2025-06-17 DIAGNOSIS — F90.0 ATTENTION DEFICIT HYPERACTIVITY DISORDER (ADHD), PREDOMINANTLY INATTENTIVE TYPE: ICD-10-CM

## 2025-06-18 RX ORDER — DEXTROAMPHETAMINE SACCHARATE, AMPHETAMINE ASPARTATE MONOHYDRATE, DEXTROAMPHETAMINE SULFATE AND AMPHETAMINE SULFATE 3.75; 3.75; 3.75; 3.75 MG/1; MG/1; MG/1; MG/1
15 CAPSULE, EXTENDED RELEASE ORAL DAILY
Qty: 30 CAPSULE | Refills: 0 | Status: SHIPPED | OUTPATIENT
Start: 2025-06-18

## 2025-07-18 ENCOUNTER — MYC REFILL (OUTPATIENT)
Dept: FAMILY MEDICINE | Facility: CLINIC | Age: 40
End: 2025-07-18
Payer: COMMERCIAL

## 2025-07-18 DIAGNOSIS — F41.1 GAD (GENERALIZED ANXIETY DISORDER): ICD-10-CM

## 2025-07-18 DIAGNOSIS — F90.0 ATTENTION DEFICIT HYPERACTIVITY DISORDER (ADHD), PREDOMINANTLY INATTENTIVE TYPE: ICD-10-CM

## 2025-07-18 RX ORDER — SERTRALINE HYDROCHLORIDE 100 MG/1
100 TABLET, FILM COATED ORAL DAILY
Qty: 90 TABLET | Refills: 0 | Status: SHIPPED | OUTPATIENT
Start: 2025-07-18

## 2025-07-22 RX ORDER — DEXTROAMPHETAMINE SACCHARATE, AMPHETAMINE ASPARTATE MONOHYDRATE, DEXTROAMPHETAMINE SULFATE AND AMPHETAMINE SULFATE 3.75; 3.75; 3.75; 3.75 MG/1; MG/1; MG/1; MG/1
15 CAPSULE, EXTENDED RELEASE ORAL DAILY
Qty: 30 CAPSULE | Refills: 0 | Status: SHIPPED | OUTPATIENT
Start: 2025-07-22

## 2025-08-10 ENCOUNTER — MYC MEDICAL ADVICE (OUTPATIENT)
Dept: FAMILY MEDICINE | Facility: CLINIC | Age: 40
End: 2025-08-10
Payer: COMMERCIAL

## 2025-08-20 ENCOUNTER — MYC REFILL (OUTPATIENT)
Dept: FAMILY MEDICINE | Facility: CLINIC | Age: 40
End: 2025-08-20
Payer: COMMERCIAL

## 2025-08-20 DIAGNOSIS — F90.0 ATTENTION DEFICIT HYPERACTIVITY DISORDER (ADHD), PREDOMINANTLY INATTENTIVE TYPE: ICD-10-CM

## 2025-08-20 RX ORDER — DEXTROAMPHETAMINE SACCHARATE, AMPHETAMINE ASPARTATE MONOHYDRATE, DEXTROAMPHETAMINE SULFATE AND AMPHETAMINE SULFATE 3.75; 3.75; 3.75; 3.75 MG/1; MG/1; MG/1; MG/1
15 CAPSULE, EXTENDED RELEASE ORAL DAILY
Qty: 30 CAPSULE | Refills: 0 | Status: CANCELLED | OUTPATIENT
Start: 2025-08-20

## 2025-08-21 RX ORDER — DEXTROAMPHETAMINE SACCHARATE, AMPHETAMINE ASPARTATE MONOHYDRATE, DEXTROAMPHETAMINE SULFATE AND AMPHETAMINE SULFATE 3.75; 3.75; 3.75; 3.75 MG/1; MG/1; MG/1; MG/1
15 CAPSULE, EXTENDED RELEASE ORAL DAILY
Qty: 30 CAPSULE | Refills: 0 | Status: SHIPPED | OUTPATIENT
Start: 2025-10-20 | End: 2025-11-19

## 2025-08-21 RX ORDER — DEXTROAMPHETAMINE SACCHARATE, AMPHETAMINE ASPARTATE MONOHYDRATE, DEXTROAMPHETAMINE SULFATE AND AMPHETAMINE SULFATE 3.75; 3.75; 3.75; 3.75 MG/1; MG/1; MG/1; MG/1
15 CAPSULE, EXTENDED RELEASE ORAL DAILY
Qty: 30 CAPSULE | Refills: 0 | Status: SHIPPED | OUTPATIENT
Start: 2025-09-20 | End: 2025-10-20

## 2025-08-21 RX ORDER — DEXTROAMPHETAMINE SACCHARATE, AMPHETAMINE ASPARTATE MONOHYDRATE, DEXTROAMPHETAMINE SULFATE AND AMPHETAMINE SULFATE 3.75; 3.75; 3.75; 3.75 MG/1; MG/1; MG/1; MG/1
15 CAPSULE, EXTENDED RELEASE ORAL DAILY
Qty: 30 CAPSULE | Refills: 0 | Status: SHIPPED | OUTPATIENT
Start: 2025-08-21 | End: 2025-09-20

## 2025-09-03 ASSESSMENT — PATIENT HEALTH QUESTIONNAIRE - PHQ9
SUM OF ALL RESPONSES TO PHQ QUESTIONS 1-9: 7
SUM OF ALL RESPONSES TO PHQ QUESTIONS 1-9: 7
10. IF YOU CHECKED OFF ANY PROBLEMS, HOW DIFFICULT HAVE THESE PROBLEMS MADE IT FOR YOU TO DO YOUR WORK, TAKE CARE OF THINGS AT HOME, OR GET ALONG WITH OTHER PEOPLE: SOMEWHAT DIFFICULT

## 2025-09-04 ENCOUNTER — OFFICE VISIT (OUTPATIENT)
Dept: FAMILY MEDICINE | Facility: CLINIC | Age: 40
End: 2025-09-04
Payer: COMMERCIAL

## 2025-09-04 VITALS
BODY MASS INDEX: 24.5 KG/M2 | WEIGHT: 190.9 LBS | TEMPERATURE: 98.4 F | DIASTOLIC BLOOD PRESSURE: 72 MMHG | SYSTOLIC BLOOD PRESSURE: 112 MMHG | HEART RATE: 66 BPM | RESPIRATION RATE: 12 BRPM | OXYGEN SATURATION: 99 % | HEIGHT: 74 IN

## 2025-09-04 DIAGNOSIS — F90.0 ATTENTION DEFICIT HYPERACTIVITY DISORDER (ADHD), PREDOMINANTLY INATTENTIVE TYPE: ICD-10-CM

## 2025-09-04 DIAGNOSIS — E55.9 VITAMIN D DEFICIENCY: ICD-10-CM

## 2025-09-04 DIAGNOSIS — F10.90 ALCOHOL USE: ICD-10-CM

## 2025-09-04 DIAGNOSIS — F41.1 GAD (GENERALIZED ANXIETY DISORDER): Primary | ICD-10-CM

## 2025-09-04 DIAGNOSIS — Z23 ENCOUNTER FOR IMMUNIZATION: ICD-10-CM

## 2025-09-04 DIAGNOSIS — F33.1 MODERATE EPISODE OF RECURRENT MAJOR DEPRESSIVE DISORDER (H): ICD-10-CM

## 2025-09-04 DIAGNOSIS — D64.9 LOW HEMOGLOBIN: ICD-10-CM

## 2025-09-04 LAB
ERYTHROCYTE [DISTWIDTH] IN BLOOD BY AUTOMATED COUNT: 12.5 % (ref 10–15)
HCT VFR BLD AUTO: 40 % (ref 40–53)
HGB BLD-MCNC: 13.7 G/DL (ref 13.3–17.7)
MCH RBC QN AUTO: 29 PG (ref 26.5–33)
MCHC RBC AUTO-ENTMCNC: 34.3 G/DL (ref 31.5–36.5)
MCV RBC AUTO: 84.6 FL (ref 78–100)
PLATELET # BLD AUTO: 262 10E3/UL (ref 150–450)
RBC # BLD AUTO: 4.73 10E6/UL (ref 4.4–5.9)
WBC # BLD AUTO: 5.44 10E3/UL (ref 4–11)

## 2025-09-04 RX ORDER — DUPILUMAB 300 MG/2ML
INJECTION, SOLUTION SUBCUTANEOUS
COMMUNITY
Start: 2025-07-24

## 2025-09-04 RX ORDER — KETOCONAZOLE 20 MG/ML
SHAMPOO, SUSPENSION TOPICAL PRN
COMMUNITY
Start: 2025-07-21

## 2025-09-04 ASSESSMENT — ANXIETY QUESTIONNAIRES
5. BEING SO RESTLESS THAT IT IS HARD TO SIT STILL: NOT AT ALL
GAD7 TOTAL SCORE: 1
7. FEELING AFRAID AS IF SOMETHING AWFUL MIGHT HAPPEN: NOT AT ALL
4. TROUBLE RELAXING: NOT AT ALL
IF YOU CHECKED OFF ANY PROBLEMS ON THIS QUESTIONNAIRE, HOW DIFFICULT HAVE THESE PROBLEMS MADE IT FOR YOU TO DO YOUR WORK, TAKE CARE OF THINGS AT HOME, OR GET ALONG WITH OTHER PEOPLE: SOMEWHAT DIFFICULT
3. WORRYING TOO MUCH ABOUT DIFFERENT THINGS: NOT AT ALL
GAD7 TOTAL SCORE: 1
7. FEELING AFRAID AS IF SOMETHING AWFUL MIGHT HAPPEN: NOT AT ALL
1. FEELING NERVOUS, ANXIOUS, OR ON EDGE: SEVERAL DAYS
6. BECOMING EASILY ANNOYED OR IRRITABLE: NOT AT ALL
8. IF YOU CHECKED OFF ANY PROBLEMS, HOW DIFFICULT HAVE THESE MADE IT FOR YOU TO DO YOUR WORK, TAKE CARE OF THINGS AT HOME, OR GET ALONG WITH OTHER PEOPLE?: SOMEWHAT DIFFICULT
GAD7 TOTAL SCORE: 1
2. NOT BEING ABLE TO STOP OR CONTROL WORRYING: NOT AT ALL